# Patient Record
Sex: MALE | Race: WHITE | NOT HISPANIC OR LATINO | Employment: OTHER | URBAN - METROPOLITAN AREA
[De-identification: names, ages, dates, MRNs, and addresses within clinical notes are randomized per-mention and may not be internally consistent; named-entity substitution may affect disease eponyms.]

---

## 2018-08-05 ENCOUNTER — TELEPHONE (OUTPATIENT)
Dept: URGENT CARE | Facility: CLINIC | Age: 83
End: 2018-08-05

## 2019-08-12 ENCOUNTER — APPOINTMENT (OUTPATIENT)
Dept: CT IMAGING | Facility: HOSPITAL | Age: 84
End: 2019-08-12

## 2019-08-12 ENCOUNTER — APPOINTMENT (OUTPATIENT)
Dept: GENERAL RADIOLOGY | Facility: HOSPITAL | Age: 84
End: 2019-08-12

## 2019-08-12 ENCOUNTER — HOSPITAL ENCOUNTER (EMERGENCY)
Facility: HOSPITAL | Age: 84
Discharge: HOME | End: 2019-08-13
Attending: EMERGENCY MEDICINE | Admitting: EMERGENCY MEDICINE

## 2019-08-12 DIAGNOSIS — S01.81XA FACIAL LACERATION, INITIAL ENCOUNTER: ICD-10-CM
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
## 2019-08-12 DIAGNOSIS — S02.2XXA CLOSED FRACTURE OF NASAL BONE, INITIAL ENCOUNTER: ICD-10-CM

## 2019-08-12 DIAGNOSIS — T07.XXXA ABRASIONS OF MULTIPLE SITES: ICD-10-CM

## 2019-08-12 PROCEDURE — 73560 X-RAY EXAM OF KNEE 1 OR 2: CPT

## 2019-08-12 PROCEDURE — 70486 CT MAXILLOFACIAL W/O DYE: CPT

## 2019-08-12 PROCEDURE — 99285 EMERGENCY DEPT VISIT HI MDM: CPT

## 2019-08-12 PROCEDURE — 73130 X-RAY EXAM OF HAND: CPT

## 2019-08-12 PROCEDURE — 70450 CT HEAD/BRAIN W/O DYE: CPT

## 2019-08-12 PROCEDURE — 72125 CT NECK SPINE W/O DYE: CPT

## 2019-08-12 RX ORDER — ONDANSETRON 4 MG/1
4 TABLET, ORALLY DISINTEGRATING ORAL ONCE
Status: COMPLETED | OUTPATIENT
Start: 2019-08-12 | End: 2019-08-12

## 2019-08-12 RX ORDER — OXYCODONE HYDROCHLORIDE AND ACETAMINOPHEN 5; 325 MG/1; MG/1
1 TABLET ORAL ONCE
Status: COMPLETED | OUTPATIENT
Start: 2019-08-12 | End: 2019-08-12

## 2019-08-12 RX ADMIN — OXYCODONE HYDROCHLORIDE AND ACETAMINOPHEN 1 TABLET: 5; 325 TABLET ORAL at 23:37

## 2019-08-12 RX ADMIN — ONDANSETRON 4 MG: 4 TABLET, ORALLY DISINTEGRATING ORAL at 23:38

## 2019-08-13 VITALS
SYSTOLIC BLOOD PRESSURE: 119 MMHG | WEIGHT: 140 LBS | BODY MASS INDEX: 21.97 KG/M2 | TEMPERATURE: 98.4 F | RESPIRATION RATE: 16 BRPM | HEIGHT: 67 IN | OXYGEN SATURATION: 94 % | DIASTOLIC BLOOD PRESSURE: 71 MMHG | HEART RATE: 70 BPM

## 2019-08-13 RX ORDER — LIDOCAINE HYDROCHLORIDE AND EPINEPHRINE 10; 10 MG/ML; UG/ML
10 INJECTION, SOLUTION INFILTRATION; PERINEURAL ONCE
Status: COMPLETED | OUTPATIENT
Start: 2019-08-13 | End: 2019-08-13

## 2019-08-13 RX ADMIN — LIDOCAINE HYDROCHLORIDE,EPINEPHRINE BITARTRATE 10 ML: 10; .01 INJECTION, SOLUTION INFILTRATION; PERINEURAL at 01:57

## 2019-08-13 NOTE — ED PROVIDER NOTES
Subjective   Jesus Montiel Jr. is a 85 y.o.male who presents to the emergency department via EMS s/p fall. The patient states he was walking outside his home at approximately 2100 when he suffered a fall. He tripped over a piece of concrete falling forward. He hit his outstretched hands, face and knees. He now complains of bilateral hand pain, left knee pain, and mild neck pain. He appreciated a laceration to his nose as well as his upper lip and forehead. He denies loss of consciousness. He also denies back pain, chest pain, abdominal pain, nausea, vomiting, abdominal pain or hip pain. He has had no fever or changes in his bowels/urine. He takes Pradaxa for his history of atrial fibrillation. There are no other acute complaints at this time.        History provided by:  Patient  Fall   Mechanism of injury: fall    Injury location:  Face, hand and leg  Facial injury location:  Forehead, nose and upper lip  Hand injury location:  L hand and R hand  Leg injury location:  L knee  Incident location:  Outdoors  Time since incident:  2 hours  Arrived directly from scene: yes    Fall:     Fall occurred:  Walking and tripped    Impact surface:  [place]    Point of impact:  Face, outstretched arms and knees    Entrapped after fall: no    Protective equipment: none    Suspicion of alcohol use: no    Suspicion of drug use: no    Prior to arrival data:     Patient ambulatory at scene: yes      Loss of consciousness: no      Amnesic to event: no    Associated symptoms: neck pain    Associated symptoms: no abdominal pain, no back pain, no chest pain, no difficulty breathing, no loss of consciousness, no nausea and no vomiting        Review of Systems   Constitutional: Negative for chills and fever.   Respiratory: Negative for shortness of breath.    Cardiovascular: Negative for chest pain.   Gastrointestinal: Negative for abdominal pain, nausea and vomiting.   Musculoskeletal: Positive for arthralgias (left knee) and neck pain.  Negative for back pain.        Positive bilateral hand pain   Skin: Positive for wound (laceration).   Neurological: Negative for loss of consciousness.   All other systems reviewed and are negative.      Past Medical History:   Diagnosis Date   • Hyperlipidemia    • Hypertension    • Meniere disease    • Myocardial infarction (CMS/HCC)    • Tremor        No Known Allergies    Past Surgical History:   Procedure Laterality Date   • ABLATION OF DYSRHYTHMIC FOCUS     • CATARACT EXTRACTION, BILATERAL     • CORONARY ANGIOPLASTY WITH STENT PLACEMENT     • CORONARY ARTERY BYPASS BRING BACK FOR EXPLORATION     • HERNIA REPAIR     • INNER EAR SURGERY     • PACEMAKER IMPLANTATION         History reviewed. No pertinent family history.    Social History     Socioeconomic History   • Marital status:      Spouse name: Not on file   • Number of children: Not on file   • Years of education: Not on file   • Highest education level: Not on file   Tobacco Use   • Smoking status: Never Smoker   • Smokeless tobacco: Never Used   Substance and Sexual Activity   • Alcohol use: Yes     Alcohol/week: 2.4 oz     Types: 4 Glasses of wine per week   • Drug use: No   • Sexual activity: Defer         Objective   Physical Exam   Constitutional: He is oriented to person, place, and time. He appears well-developed and well-nourished. No distress.   Patient is alert and oriented. He gives a detailed account of the preceding events.   HENT:   Head: Normocephalic. Head is with abrasion and with laceration.   Nose: Nose lacerations present.   There is an abrasion/laceration to the center of forehead. There is a T-shaped laceration to the bridge of the nose with mild oozing of blood. He has associated tenderness at the nose. He has an abrasion to the center of the upper lip with associated swelling and tenderness. No underlying pain to palpation of the alveolar ridge. No signs of injury to the teeth. He has dried blood in the posterior oropharynx  without active bleeding.    Eyes: Conjunctivae are normal. No scleral icterus.   Neck: Normal range of motion. Neck supple.   He expresses mild tenderness to palpation over the midline neck.    Cardiovascular: Normal rate, regular rhythm and normal heart sounds.   Pulmonary/Chest: Effort normal and breath sounds normal. No respiratory distress. He exhibits no tenderness.   Abdominal: Soft. There is no tenderness.   Musculoskeletal: Normal range of motion.   No T or L-spine tenderness. There is no signs of trauma to the back. He has tenderness to the bilateral hands with associated bruising. No obvious deformity of the hand. Mild tenderness over the anterior left knee with associated bruising.    Neurological: He is alert and oriented to person, place, and time.   Skin: Skin is warm and dry.   Psychiatric: He has a normal mood and affect. His behavior is normal.   Nursing note and vitals reviewed.      Laceration Repair  Date/Time: 8/13/2019 1:53 AM  Performed by: Radni Arana MD  Authorized by: Randi Arana MD     Consent:     Consent obtained:  Verbal    Consent given by:  Patient    Risks discussed:  Infection, pain, poor cosmetic result, retained foreign body, poor wound healing and need for additional repair    Alternatives discussed:  No treatment and observation  Anesthesia (see MAR for exact dosages):     Anesthesia method:  Local infiltration    Local anesthetic:  Lidocaine 1% WITH epi  Laceration details:     Location:  Face    Face location:  Nose    Length (cm):  2    Depth (mm):  5  Repair type:     Repair type:  Simple  Pre-procedure details:     Preparation:  Patient was prepped and draped in usual sterile fashion and imaging obtained to evaluate for foreign bodies  Exploration:     Hemostasis achieved with:  Epinephrine and direct pressure    Wound exploration: wound explored through full range of motion      Wound extent: no fascia violation noted and no foreign bodies/material  noted      Contaminated: no    Treatment:     Area cleansed with:  Betadine    Amount of cleaning:  Standard    Irrigation solution:  Sterile saline    Irrigation volume:  200cc    Irrigation method:  Syringe    Visualized foreign bodies/material removed: no    Skin repair:     Repair method:  Sutures    Suture size:  5-0    Suture material:  Nylon    Suture technique:  Simple interrupted    Number of sutures:  6  Approximation:     Approximation:  Close    Vermilion border: well-aligned    Post-procedure details:     Dressing:  Open (no dressing)    Patient tolerance of procedure:  Tolerated well, no immediate complications                   ED Course     No results found for this or any previous visit (from the past 24 hour(s)).  Note: In addition to lab results from this visit, the labs listed above may include labs taken at another facility or during a different encounter within the last 24 hours. Please correlate lab times with ED admission and discharge times for further clarification of the services performed during this visit.    XR Knee 1 or 2 View Left   Final Result      XR Hand 3+ View Bilateral   Final Result   No acute traumatic findings.      Signer Name: Km Cedeño MD    Signed: 8/12/2019 11:59 PM    Workstation Name: [facility]     [facility]      CT Cervical Spine Without Contrast   Final Result   Degenerative change without acute abnormality      Signer Name: Km Cedeño MD    Signed: 8/12/2019 11:18 PM    [facility]     [facility]      CT Facial Bones Without Contrast   Final Result   Mildly displaced nasal alar fracture, mild frontal scalp soft tissue swelling.      Signer Name: Km Cedeño MD    Signed: 8/12/2019 11:16 PM    [facility]     [facility]      CT Head Without Contrast   Final Result   Senescent changes without acute abnormality.      Signer Name:  Km Cedeño MD    Signed: 8/12/2019 11:12 PM    Workstation Name: [email or web address]     [facility]        Vitals:    08/13/19 0115 08/13/19 0118 08/13/19 0145 08/13/19 0215   BP: 117/63  111/73 119/71   Pulse:  70 70 70   Resp:       Temp:       TempSrc:       SpO2:  92% 95% 99%   Weight:       Height:         Medications   oxyCODONE-acetaminophen (PERCOCET) 5-325 MG per tablet 1 tablet (1 tablet Oral Given 8/12/19 2337)   ondansetron ODT (ZOFRAN-ODT) disintegrating tablet 4 mg (4 mg Oral Given 8/12/19 2338)   lidocaine-EPINEPHrine (XYLOCAINE W/EPI) 1 %-1:676693 injection 10 mL (10 mL Injection Given 8/13/19 0157)     ECG/EMG Results (last 24 hours)     ** No results found for the last 24 hours. **        No orders to display                       MDM  Number of Diagnoses or Management Options  Abrasions of multiple sites: new and requires workup  Closed fracture of nasal bone, initial encounter: new and requires workup  Facial laceration, initial encounter: new and requires workup  Fall, initial encounter: new and requires workup  Diagnosis management comments: No acute injuries identified on CT scan of the head with CT scan of the neck.  No acute fractures identified on x-ray left knee or the bilateral hands.    Nasal bone fracture was identified on CT scan of the face.    Abrasions over the forehead and lips did not require any surgical repair.  Laceration to the nasal bridge required 6 simple interrupted sutures and was approximated well with no complications and no foreign bodies.    Patient will be discharged with the advise to return for suture removal in 5 days.       Amount and/or Complexity of Data Reviewed  Tests in the radiology section of CPT®: ordered and reviewed  Obtain history from someone other than the patient: yes  Review and summarize past medical records: yes  Independent visualization of images, tracings, or specimens: yes        Final diagnoses:   Fall, initial  encounter   Closed fracture of nasal bone, initial encounter   Abrasions of multiple sites   Facial laceration, initial encounter       Documentation assistance provided by chapito Gan.  Information recorded by the scribe was done at my direction and has been verified and validated by me.     Nghia Gan  08/12/19 0231       Nghia Gan  08/13/19 4383       Randi Arana MD  08/13/19 5539

## 2019-08-13 NOTE — DISCHARGE INSTRUCTIONS
Keep wound clean with soap and water.    Follow-up for suture removal in 5 days.    Put ice over areas of pain, swelling, and bruising.

## 2019-08-18 ENCOUNTER — APPOINTMENT (OUTPATIENT)
Dept: GENERAL RADIOLOGY | Facility: HOSPITAL | Age: 84
End: 2019-08-18

## 2019-08-18 ENCOUNTER — HOSPITAL ENCOUNTER (EMERGENCY)
Facility: HOSPITAL | Age: 84
Discharge: HOME | End: 2019-08-18
Attending: EMERGENCY MEDICINE | Admitting: EMERGENCY MEDICINE

## 2019-08-18 VITALS
HEART RATE: 70 BPM | WEIGHT: 147 LBS | SYSTOLIC BLOOD PRESSURE: 105 MMHG | HEIGHT: 67 IN | OXYGEN SATURATION: 95 % | RESPIRATION RATE: 16 BRPM | TEMPERATURE: 98.6 F | DIASTOLIC BLOOD PRESSURE: 52 MMHG | BODY MASS INDEX: 23.07 KG/M2

## 2019-08-18 DIAGNOSIS — Z91.81 HISTORY OF FALL: ICD-10-CM

## 2019-08-18 DIAGNOSIS — M25.562 ACUTE PAIN OF LEFT KNEE: Primary | ICD-10-CM

## 2019-08-18 PROCEDURE — 99283 EMERGENCY DEPT VISIT LOW MDM: CPT

## 2019-08-18 PROCEDURE — 73560 X-RAY EXAM OF KNEE 1 OR 2: CPT

## 2019-08-19 NOTE — ED PROVIDER NOTES
Subjective   Patient presents to the emergency department in follow-up for suture removal.  He was seen in our department on August 12 for a fall having sustained a laceration on his face.  The site is healing well and he is nontender.  His facial ecchymosis is resolving slowly.  Patient goes on to mention that he is experiencing pain in his left knee which was also injured in his fall.  Imaging was performed at that time.  While the patient states he has no pain with weightbearing on flat surfaces, he has made a specific observation that when he walks downstairs, he experiences a shifting within the joint that is very painful.  He continues to have swelling on the knee.  He denies any pain proximally or distally.  He denies any additional injuries.        History provided by:  Patient and relative   used: No    Knee Pain   Lower extremity pain location: Left knee.  Time since incident:  6 days (6)  Injury: yes    Mechanism of injury: fall    Fall:     Point of impact:  Face (And left knee)  Pain details:     Quality:  Aching (When walking downstairs at the left knee)    Radiates to:  Does not radiate    Severity:  Moderate    Onset quality:  Sudden    Duration:  6 days    Progression:  Unchanged  Chronicity:  New  Dislocation: no    Prior injury to area:  Yes (see HPI)  Exacerbated by: Walking down stairways.  Associated symptoms: swelling    Associated symptoms: no back pain, no decreased ROM, no fever, no neck pain, no numbness and no tingling    Risk factors: no concern for non-accidental trauma        Review of Systems   Constitutional: Negative for fever.   Endocrine: Negative.    Musculoskeletal: Positive for joint swelling. Negative for back pain and neck pain.        Left knee pain walking downstairs.     Skin:        Patient has a healing laceration to his face.     Allergic/Immunologic: Negative.    Neurological: Negative.  Negative for dizziness and syncope.   Hematological:  Bruises/bleeds easily (patient has excessive ecchymosis due to use of anticoagulants).   Psychiatric/Behavioral: Negative.    All other systems reviewed and are negative.      Past Medical History:   Diagnosis Date   • Hyperlipidemia    • Hypertension    • Meniere disease    • Myocardial infarction (CMS/HCC)    • Tremor        No Known Allergies    Past Surgical History:   Procedure Laterality Date   • ABLATION OF DYSRHYTHMIC FOCUS     • CATARACT EXTRACTION, BILATERAL     • CORONARY ANGIOPLASTY WITH STENT PLACEMENT     • CORONARY ARTERY BYPASS BRING BACK FOR EXPLORATION     • HERNIA REPAIR     • INNER EAR SURGERY     • PACEMAKER IMPLANTATION         No family history on file.    Social History     Socioeconomic History   • Marital status:      Spouse name: Not on file   • Number of children: Not on file   • Years of education: Not on file   • Highest education level: Not on file   Tobacco Use   • Smoking status: Never Smoker   • Smokeless tobacco: Never Used   Substance and Sexual Activity   • Alcohol use: Yes     Alcohol/week: 2.4 oz     Types: 4 Glasses of wine per week   • Drug use: No   • Sexual activity: Defer           Objective   Physical Exam   Constitutional: He is oriented to person, place, and time. He appears well-developed and well-nourished. No distress.   HENT:   Mouth/Throat: Oropharynx is clear and moist.   She has aged ecchymosis in the periorbital region in the nose.  The aforementioned laceration near the nose is well approximated and does not appear infected.     Eyes: Conjunctivae are normal.   Neck: Normal range of motion. No JVD present.   Cardiovascular: Normal rate.   Pulmonary/Chest: Effort normal and breath sounds normal. He exhibits no tenderness.   Abdominal: Soft.   Musculoskeletal:   Left lower extremity: Patient has mild soft tissue swelling/effusion at the knee.  No laxity x4.  Proximal and distal joints are negative.  Neurovascular exam is negative.  There is no crepitus or  "ballottement   Neurological: He is alert and oriented to person, place, and time. No sensory deficit. Coordination normal.   Skin: Skin is warm and dry. Capillary refill takes less than 2 seconds. He is not diaphoretic.   Psychiatric: He has a normal mood and affect. His behavior is normal. Judgment and thought content normal.       Procedures           ED Course      No results found for this or any previous visit (from the past 24 hour(s)).  Note: In addition to lab results from this visit, the labs listed above may include labs taken at another facility or during a different encounter within the last 24 hours. Please correlate lab times with ED admission and discharge times for further clarification of the services performed during this visit.    XR Knee 1 or 2 View Left   Final Result   Mild tricompartmental osteoarthritis and a small suprapatellar joint effusion. Effusion smaller than on prior.      Signer Name: Pam Mondragon MD    Signed: 8/18/2019 9:05 PM    [email or web address]     [facility]        Vitals:    08/18/19 1920   BP: 105/52   BP Location: Left arm   Patient Position: Sitting   Pulse: 70   Resp: 16   Temp: 98.6 °F (37 °C)   TempSrc: Oral   SpO2: 95%   Weight: 66.7 kg (147 lb)   Height: 170.2 cm (67\")     Medications - No data to display  ECG/EMG Results (last 24 hours)     ** No results found for the last 24 hours. **        No orders to display                   MDM      Final diagnoses:   Acute pain of left knee   History of fall            Evita Gonzalez, APRN  08/23/19 8175    "

## 2019-08-19 NOTE — DISCHARGE INSTRUCTIONS
Use the Ace wrap on the left knee until follow-up with orthopedic surgeon, Dr. reinoso.  Call his office tomorrow for a follow-up appointment.  In the meantime, discontinue use of stairs and consider escalators when in public.  Tylenol as needed for discomfort.  Return to the emergency department as needed for worsening symptoms or concerns.  Thank you

## 2020-08-03 ENCOUNTER — APPOINTMENT (OUTPATIENT)
Dept: GENERAL RADIOLOGY | Facility: HOSPITAL | Age: 85
End: 2020-08-03

## 2020-08-03 ENCOUNTER — HOSPITAL ENCOUNTER (EMERGENCY)
Facility: HOSPITAL | Age: 85
Discharge: HOME | End: 2020-08-03
Attending: EMERGENCY MEDICINE | Admitting: EMERGENCY MEDICINE

## 2020-08-03 VITALS
OXYGEN SATURATION: 96 % | SYSTOLIC BLOOD PRESSURE: 115 MMHG | BODY MASS INDEX: 22.73 KG/M2 | HEIGHT: 68 IN | HEART RATE: 70 BPM | TEMPERATURE: 97.7 F | RESPIRATION RATE: 12 BRPM | DIASTOLIC BLOOD PRESSURE: 69 MMHG | WEIGHT: 150 LBS
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
## 2020-08-03 DIAGNOSIS — S40.012A CONTUSION OF LEFT SHOULDER, INITIAL ENCOUNTER: ICD-10-CM

## 2020-08-03 PROCEDURE — 72040 X-RAY EXAM NECK SPINE 2-3 VW: CPT

## 2020-08-03 PROCEDURE — 73000 X-RAY EXAM OF COLLAR BONE: CPT

## 2020-08-03 PROCEDURE — 73030 X-RAY EXAM OF SHOULDER: CPT

## 2020-08-03 PROCEDURE — 99284 EMERGENCY DEPT VISIT MOD MDM: CPT

## 2020-08-03 PROCEDURE — P9612 CATHETERIZE FOR URINE SPEC: HCPCS

## 2020-08-03 NOTE — ED PROVIDER NOTES
Subjective   Jesus Montiel Jr. is an 86 y.o. male who presents to the ED with c/o fall. The patient reports he was ambulating around the parking lot of his long term care facility when he fell. During the fall he made impact on the front fender of a pickup truck with his left shoulder, slowly sliding down the fender onto the concrete pavement. The patient did not his his head or lose consciousness during the fall but since then he has been experiencing left shoulder pain, left sided neck pain, and he suffered some abrasions to his left elbow. He denies any rib pain. The patient is currently anticoagulated with 2.5 mg of Eliquis twice daily. He has a past medical history of hyperlipidemia, hypertension, myocardial infarction, and Meniere disease. His surgical history includes CABG, ablation of dysrhythmic focus, coronary angioplasty with stent, and pacemaker implantation. There are no other acute complaints at this time.       History provided by:  Patient and EMS personnel  Fall   Mechanism of injury: fall    Injury location:  Head/neck and shoulder/arm  Head/neck injury location:  L neck  Shoulder/arm injury location:  L shoulder  Incident location:  Outdoors  Time since incident:  3 hours  Arrived directly from scene: yes    Fall:     Fall occurred:  Walking    Impact surface:  [place] (pickup truck)    Point of impact: left shoulder.  Suspicion of alcohol use: no    Suspicion of drug use: no    Tetanus status:  Unknown  Prior to arrival data:     Patient ambulatory at scene: yes      Blood loss:  Minimal    Responsiveness at scene:  Alert    Orientation at scene:  Place, situation, time and person    Loss of consciousness: no      Amnesic to event: no    Associated symptoms: neck pain    Associated symptoms: no abdominal pain, no back pain, no chest pain, no loss of consciousness, no nausea, no seizures and no vomiting    Risk factors: anticoagulation therapy, CABG, pacemaker and past MI    Risk factors: no CHF,  no COPD and no diabetes        Review of Systems   HENT:        The patient denies hitting his head.   Cardiovascular: Negative for chest pain.   Gastrointestinal: Negative for abdominal pain, nausea and vomiting.   Musculoskeletal: Positive for neck pain. Negative for back pain.        The patient complains of left shoulder pain and left sided neck pain.  He denies rib pain.   Skin: Positive for wound.        The patient complains of abrasions to his left elbow.   Neurological: Negative for seizures, loss of consciousness and syncope.        He denies loss of consciousness.   All other systems reviewed and are negative.      Past Medical History:   Diagnosis Date   • Hyperlipidemia    • Hypertension    • Meniere disease    • Myocardial infarction (CMS/HCC)    • Tremor        No Known Allergies    Past Surgical History:   Procedure Laterality Date   • ABLATION OF DYSRHYTHMIC FOCUS     • CATARACT EXTRACTION, BILATERAL     • CORONARY ANGIOPLASTY WITH STENT PLACEMENT     • CORONARY ARTERY BYPASS BRING BACK FOR EXPLORATION     • HERNIA REPAIR     • INNER EAR SURGERY     • PACEMAKER IMPLANTATION         History reviewed. No pertinent family history.    Social History     Socioeconomic History   • Marital status:      Spouse name: Not on file   • Number of children: Not on file   • Years of education: Not on file   • Highest education level: Not on file   Tobacco Use   • Smoking status: Never Smoker   • Smokeless tobacco: Never Used   Substance and Sexual Activity   • Alcohol use: Yes     Alcohol/week: 4.0 standard drinks     Types: 4 Glasses of wine per week   • Drug use: No   • Sexual activity: Defer         Objective   Physical Exam   Constitutional: He is oriented to person, place, and time. He appears well-developed and well-nourished. No distress.   HENT:   Head: Normocephalic and atraumatic.   Eyes: Conjunctivae are normal. No scleral icterus.   Neck: Normal range of motion. Neck supple.   Cardiovascular:  Normal rate, regular rhythm and normal heart sounds.   No murmur heard.  Pulmonary/Chest: Effort normal and breath sounds normal. No respiratory distress. He exhibits tenderness.   Tenderness to palpation to the left lateral ribs.   Abdominal: Soft. There is no tenderness.   Musculoskeletal: Normal range of motion. He exhibits tenderness. He exhibits no edema.        Left shoulder: He exhibits tenderness.        Right hip: He exhibits no tenderness and no bony tenderness.        Left hip: He exhibits no tenderness and no bony tenderness.        Right knee: No tenderness found.        Left knee: No tenderness found.        Cervical back: He exhibits tenderness.   Tenderness to palpation to the left anterior shoulder, left clavicle, and left lateral ribs. Normal range of motion.  No tenderness to palpation to the bilateral knees and hips.  Mild left cervical paravertebral tenderness to palpation.  He is currently in a hard collar.  No tenderness to palpation to the bilateral elbows and wrists. Normal range of motion.   Neurological: He is alert and oriented to person, place, and time.   Normal mentation.   Skin: Skin is warm and dry. Laceration noted.   Small skin tear to the left elbow. No active bleeding.   Psychiatric: He has a normal mood and affect. His behavior is normal.   Nursing note and vitals reviewed.      Procedures         ED Course     No results found for this or any previous visit (from the past 24 hour(s)).  Note: In addition to lab results from this visit, the labs listed above may include labs taken at another facility or during a different encounter within the last 24 hours. Please correlate lab times with ED admission and discharge times for further clarification of the services performed during this visit.    XR Clavicle Left   Final Result   No acute fracture or dislocation of the left clavicle.   Moderate AC joint degenerative change.       This report was finalized on 8/3/2020 5:07 PM by  Endy Chacko.          XR Shoulder 2+ View Left   Final Result   No acute fracture or dislocation. Moderate AC joint   degenerative changes.       This report was finalized on 8/3/2020 5:06 PM by Endy Chacko.          XR Spine Cervical 2 or 3 View   Final Result   No acute fracture or subluxation appreciated.       This report was finalized on 8/3/2020 5:10 PM by Endy Chacko.            Vitals:    08/03/20 1545 08/03/20 1600 08/03/20 1700 08/03/20 1815   BP: 133/75 112/66 115/69    Patient Position: Sitting      Pulse: 70   70   Resp: 12      Temp: 97.7 °F (36.5 °C)      TempSrc: Oral      SpO2: 95% 93% 93% 96%   Weight:       Height:         Medications - No data to display  ECG/EMG Results (last 24 hours)     ** No results found for the last 24 hours. **        No orders to display       Patient ambulatory prior to discharge.  Discussed x-ray findings with patient.  Advised to do ice, rest, and follow-up with primary care provider in the next 2 to 3 days or return to the ER with worsening of symptoms.  Patient verbalized understanding                                       MDM    Final diagnoses:   Fall from standing, initial encounter   Contusion of left shoulder, initial encounter       Documentation assistance provided by chapito Richmond.  Information recorded by the chapito was done at my direction and has been verified and validated by me.     Damian Richmond  08/03/20 7486       Becca Saini APRN  08/04/20 5322

## 2020-09-27 ENCOUNTER — HOSPITAL ENCOUNTER (EMERGENCY)
Facility: HOSPITAL | Age: 85
Discharge: HOME | End: 2020-09-27
Attending: EMERGENCY MEDICINE | Admitting: EMERGENCY MEDICINE

## 2020-09-27 ENCOUNTER — APPOINTMENT (OUTPATIENT)
Dept: GENERAL RADIOLOGY | Facility: HOSPITAL | Age: 85
End: 2020-09-27

## 2020-09-27 ENCOUNTER — APPOINTMENT (OUTPATIENT)
Dept: CT IMAGING | Facility: HOSPITAL | Age: 85
End: 2020-09-27

## 2020-09-27 VITALS
HEIGHT: 69 IN | OXYGEN SATURATION: 97 % | BODY MASS INDEX: 20.73 KG/M2 | HEART RATE: 70 BPM | RESPIRATION RATE: 16 BRPM | DIASTOLIC BLOOD PRESSURE: 64 MMHG | SYSTOLIC BLOOD PRESSURE: 114 MMHG | TEMPERATURE: 97.5 F | WEIGHT: 140 LBS

## 2020-09-27 DIAGNOSIS — M25.562 ACUTE PAIN OF LEFT KNEE: ICD-10-CM

## 2020-09-27 DIAGNOSIS — S00.83XA TRAUMATIC HEMATOMA OF FOREHEAD, INITIAL ENCOUNTER: ICD-10-CM

## 2020-09-27 DIAGNOSIS — S02.2XXA CLOSED FRACTURE OF NASAL BONE, INITIAL ENCOUNTER: Primary | ICD-10-CM

## 2020-09-27 DIAGNOSIS — T07.XXXA ABRASIONS OF MULTIPLE SITES: ICD-10-CM

## 2020-09-27 PROCEDURE — 70486 CT MAXILLOFACIAL W/O DYE: CPT

## 2020-09-27 PROCEDURE — 90715 TDAP VACCINE 7 YRS/> IM: CPT | Performed by: EMERGENCY MEDICINE

## 2020-09-27 PROCEDURE — 25010000002 TDAP 5-2.5-18.5 LF-MCG/0.5 SUSPENSION: Performed by: EMERGENCY MEDICINE

## 2020-09-27 PROCEDURE — 73552 X-RAY EXAM OF FEMUR 2/>: CPT

## 2020-09-27 PROCEDURE — 73560 X-RAY EXAM OF KNEE 1 OR 2: CPT

## 2020-09-27 PROCEDURE — 70450 CT HEAD/BRAIN W/O DYE: CPT

## 2020-09-27 PROCEDURE — 90471 IMMUNIZATION ADMIN: CPT | Performed by: EMERGENCY MEDICINE

## 2020-09-27 PROCEDURE — 72125 CT NECK SPINE W/O DYE: CPT

## 2020-09-27 PROCEDURE — 99284 EMERGENCY DEPT VISIT MOD MDM: CPT

## 2020-09-27 PROCEDURE — 72170 X-RAY EXAM OF PELVIS: CPT

## 2020-09-27 RX ORDER — HYDROCODONE BITARTRATE AND ACETAMINOPHEN 5; 325 MG/1; MG/1
1 TABLET ORAL ONCE
Status: COMPLETED | OUTPATIENT
Start: 2020-09-27 | End: 2020-09-27

## 2020-09-27 RX ORDER — CHOLECALCIFEROL (VITAMIN D3) 125 MCG
CAPSULE ORAL DAILY
COMMUNITY

## 2020-09-27 RX ADMIN — HYDROCODONE BITARTATE AND ACETAMINOPHEN 1 TABLET: 5; 325 TABLET ORAL at 18:39

## 2020-09-27 RX ADMIN — TETANUS TOXOID, REDUCED DIPHTHERIA TOXOID AND ACELLULAR PERTUSSIS VACCINE, ADSORBED 0.5 ML: 5; 2.5; 8; 8; 2.5 SUSPENSION INTRAMUSCULAR at 18:33

## 2020-11-16 ENCOUNTER — HOSPITAL ENCOUNTER (INPATIENT)
Facility: HOSPITAL | Age: 85
LOS: 3 days | Discharge: HOME HEALTH | End: 2020-11-19
Attending: EMERGENCY MEDICINE | Admitting: INTERNAL MEDICINE

## 2020-11-16 DIAGNOSIS — L03.115 CELLULITIS OF RIGHT LEG: Primary | ICD-10-CM

## 2020-11-16 DIAGNOSIS — J40 BRONCHITIS: ICD-10-CM

## 2020-11-16 PROBLEM — Z78.9 FAILURE OF OUTPATIENT TREATMENT: Status: ACTIVE | Noted: 2020-11-16

## 2020-11-16 PROBLEM — I48.91 A-FIB: Status: ACTIVE | Noted: 2020-11-16

## 2020-11-16 LAB
ALBUMIN SERPL-MCNC: 3.9 G/DL (ref 3.5–5.2)
ALBUMIN/GLOB SERPL: 1.4 G/DL
ALP SERPL-CCNC: 89 U/L (ref 39–117)
ALT SERPL W P-5'-P-CCNC: 14 U/L (ref 1–41)
ANION GAP SERPL CALCULATED.3IONS-SCNC: 6 MMOL/L (ref 5–15)
AST SERPL-CCNC: 20 U/L (ref 1–40)
BASOPHILS # BLD AUTO: 0.02 10*3/MM3 (ref 0–0.2)
BASOPHILS NFR BLD AUTO: 0.3 % (ref 0–1.5)
BILIRUB SERPL-MCNC: 0.6 MG/DL (ref 0–1.2)
BUN SERPL-MCNC: 30 MG/DL (ref 8–23)
BUN/CREAT SERPL: 30.9 (ref 7–25)
CALCIUM SPEC-SCNC: 9.5 MG/DL (ref 8.6–10.5)
CHLORIDE SERPL-SCNC: 106 MMOL/L (ref 98–107)
CO2 SERPL-SCNC: 29 MMOL/L (ref 22–29)
CREAT SERPL-MCNC: 0.97 MG/DL (ref 0.76–1.27)
D-LACTATE SERPL-SCNC: 1 MMOL/L (ref 0.5–2)
DEPRECATED RDW RBC AUTO: 48.2 FL (ref 37–54)
EOSINOPHIL # BLD AUTO: 0.05 10*3/MM3 (ref 0–0.4)
EOSINOPHIL NFR BLD AUTO: 0.7 % (ref 0.3–6.2)
ERYTHROCYTE [DISTWIDTH] IN BLOOD BY AUTOMATED COUNT: 13.8 % (ref 12.3–15.4)
GFR SERPL CREATININE-BSD FRML MDRD: 73 ML/MIN/1.73
GLOBULIN UR ELPH-MCNC: 2.7 GM/DL
GLUCOSE SERPL-MCNC: 103 MG/DL (ref 65–99)
HCT VFR BLD AUTO: 41.1 % (ref 37.5–51)
HGB BLD-MCNC: 13.1 G/DL (ref 13–17.7)
HOLD SPECIMEN: NORMAL
HOLD SPECIMEN: NORMAL
IMM GRANULOCYTES # BLD AUTO: 0.01 10*3/MM3 (ref 0–0.05)
IMM GRANULOCYTES NFR BLD AUTO: 0.1 % (ref 0–0.5)
LYMPHOCYTES # BLD AUTO: 0.8 10*3/MM3 (ref 0.7–3.1)
LYMPHOCYTES NFR BLD AUTO: 11.6 % (ref 19.6–45.3)
MCH RBC QN AUTO: 30.3 PG (ref 26.6–33)
MCHC RBC AUTO-ENTMCNC: 31.9 G/DL (ref 31.5–35.7)
MCV RBC AUTO: 94.9 FL (ref 79–97)
MONOCYTES # BLD AUTO: 0.71 10*3/MM3 (ref 0.1–0.9)
MONOCYTES NFR BLD AUTO: 10.3 % (ref 5–12)
NEUTROPHILS NFR BLD AUTO: 5.31 10*3/MM3 (ref 1.7–7)
NEUTROPHILS NFR BLD AUTO: 77 % (ref 42.7–76)
NRBC BLD AUTO-RTO: 0 /100 WBC (ref 0–0.2)
PLATELET # BLD AUTO: 114 10*3/MM3 (ref 140–450)
PMV BLD AUTO: 11.8 FL (ref 6–12)
POTASSIUM SERPL-SCNC: 4.7 MMOL/L (ref 3.5–5.2)
PROT SERPL-MCNC: 6.6 G/DL (ref 6–8.5)
RBC # BLD AUTO: 4.33 10*6/MM3 (ref 4.14–5.8)
SODIUM SERPL-SCNC: 141 MMOL/L (ref 136–145)
WBC # BLD AUTO: 6.9 10*3/MM3 (ref 3.4–10.8)
WHOLE BLOOD HOLD SPECIMEN: NORMAL
WHOLE BLOOD HOLD SPECIMEN: NORMAL

## 2020-11-16 PROCEDURE — 93010 ELECTROCARDIOGRAM REPORT: CPT | Performed by: INTERNAL MEDICINE

## 2020-11-16 PROCEDURE — 25010000002 VANCOMYCIN 10 G RECONSTITUTED SOLUTION: Performed by: EMERGENCY MEDICINE

## 2020-11-16 PROCEDURE — 83605 ASSAY OF LACTIC ACID: CPT | Performed by: EMERGENCY MEDICINE

## 2020-11-16 PROCEDURE — 87040 BLOOD CULTURE FOR BACTERIA: CPT | Performed by: EMERGENCY MEDICINE

## 2020-11-16 PROCEDURE — U0004 COV-19 TEST NON-CDC HGH THRU: HCPCS | Performed by: FAMILY MEDICINE

## 2020-11-16 PROCEDURE — 93005 ELECTROCARDIOGRAM TRACING: CPT | Performed by: FAMILY MEDICINE

## 2020-11-16 PROCEDURE — 99285 EMERGENCY DEPT VISIT HI MDM: CPT

## 2020-11-16 PROCEDURE — 99222 1ST HOSP IP/OBS MODERATE 55: CPT | Performed by: FAMILY MEDICINE

## 2020-11-16 PROCEDURE — 80053 COMPREHEN METABOLIC PANEL: CPT | Performed by: EMERGENCY MEDICINE

## 2020-11-16 PROCEDURE — 85025 COMPLETE CBC W/AUTO DIFF WBC: CPT | Performed by: EMERGENCY MEDICINE

## 2020-11-16 RX ORDER — SODIUM CHLORIDE 0.9 % (FLUSH) 0.9 %
10 SYRINGE (ML) INJECTION EVERY 12 HOURS SCHEDULED
Status: DISCONTINUED | OUTPATIENT
Start: 2020-11-16 | End: 2020-11-19 | Stop reason: HOSPADM

## 2020-11-16 RX ORDER — CHOLECALCIFEROL (VITAMIN D3) 125 MCG
5 CAPSULE ORAL NIGHTLY PRN
Status: DISCONTINUED | OUTPATIENT
Start: 2020-11-16 | End: 2020-11-19 | Stop reason: HOSPADM

## 2020-11-16 RX ORDER — SODIUM CHLORIDE 9 MG/ML
100 INJECTION, SOLUTION INTRAVENOUS CONTINUOUS
Status: ACTIVE | OUTPATIENT
Start: 2020-11-16 | End: 2020-11-17

## 2020-11-16 RX ORDER — ALBUTEROL SULFATE 90 UG/1
2 AEROSOL, METERED RESPIRATORY (INHALATION) EVERY 6 HOURS PRN
Status: DISCONTINUED | OUTPATIENT
Start: 2020-11-16 | End: 2020-11-19 | Stop reason: HOSPADM

## 2020-11-16 RX ORDER — DABIGATRAN ETEXILATE 150 MG/1
150 CAPSULE ORAL EVERY 12 HOURS SCHEDULED
Status: DISCONTINUED | OUTPATIENT
Start: 2020-11-16 | End: 2020-11-19 | Stop reason: HOSPADM

## 2020-11-16 RX ORDER — SODIUM CHLORIDE 0.9 % (FLUSH) 0.9 %
10 SYRINGE (ML) INJECTION AS NEEDED
Status: DISCONTINUED | OUTPATIENT
Start: 2020-11-16 | End: 2020-11-19 | Stop reason: HOSPADM

## 2020-11-16 RX ORDER — ACETAMINOPHEN 650 MG/1
650 SUPPOSITORY RECTAL EVERY 4 HOURS PRN
Status: DISCONTINUED | OUTPATIENT
Start: 2020-11-16 | End: 2020-11-19 | Stop reason: HOSPADM

## 2020-11-16 RX ORDER — ACETAMINOPHEN 160 MG/5ML
650 SOLUTION ORAL EVERY 4 HOURS PRN
Status: DISCONTINUED | OUTPATIENT
Start: 2020-11-16 | End: 2020-11-19 | Stop reason: HOSPADM

## 2020-11-16 RX ORDER — ACETAMINOPHEN 325 MG/1
650 TABLET ORAL EVERY 4 HOURS PRN
Status: DISCONTINUED | OUTPATIENT
Start: 2020-11-16 | End: 2020-11-19 | Stop reason: HOSPADM

## 2020-11-16 RX ORDER — ATORVASTATIN CALCIUM 10 MG/1
10 TABLET, FILM COATED ORAL DAILY
Status: DISCONTINUED | OUTPATIENT
Start: 2020-11-17 | End: 2020-11-19 | Stop reason: HOSPADM

## 2020-11-16 RX ADMIN — VANCOMYCIN HYDROCHLORIDE 1250 MG: 100 INJECTION, POWDER, LYOPHILIZED, FOR SOLUTION INTRAVENOUS at 20:38

## 2020-11-16 RX ADMIN — SODIUM CHLORIDE 100 ML/HR: 9 INJECTION, SOLUTION INTRAVENOUS at 22:07

## 2020-11-16 RX ADMIN — SODIUM CHLORIDE, PRESERVATIVE FREE 10 ML: 5 INJECTION INTRAVENOUS at 22:07

## 2020-11-16 RX ADMIN — DABIGATRAN ETEXILATE MESYLATE 150 MG: 150 CAPSULE ORAL at 22:40

## 2020-11-16 NOTE — ED PROVIDER NOTES
Subjective   86-year-old male presents with complaint of right lower extremity swelling and drainage.  The patient has had some degree of redness to the right lower extremity for approximate the last week.  He was initiated on Keflex approximately 6 days ago and has completed that course.  He reports over the last 3 days a focal area of swelling has opened up and began to drain significant clear fluid from right lower anterior tibial region. Feels that the fluid has began to drain more since initiating the antibiotics.  He reports some subjective fever, and mild chills.  He denies any respiratory symptoms including no upper respiratory congestion, sore throat, rhinorrhea, cough, chest pain, shortness of breath, no reported change in sense of taste or smell.  No report abdominal pain, no change in bowel or urinary function.  The patient is from [facility].          Review of Systems   Constitutional: Negative for chills, fatigue and fever.   HENT: Negative for congestion, ear pain, postnasal drip, sinus pressure and sore throat.    Eyes: Negative for pain, redness and visual disturbance.   Respiratory: Negative for cough, chest tightness and shortness of breath.    Cardiovascular: Negative for chest pain, palpitations and leg swelling.   Gastrointestinal: Negative for abdominal pain, anal bleeding, blood in stool, diarrhea, nausea and vomiting.   Endocrine: Negative for polydipsia and polyuria.   Genitourinary: Negative for difficulty urinating, dysuria, frequency and urgency.   Musculoskeletal: Negative for arthralgias, back pain and neck pain.   Skin: Positive for color change, rash and wound. Negative for pallor.   Allergic/Immunologic: Negative for environmental allergies and immunocompromised state.   Neurological: Negative for dizziness, weakness and headaches.   Hematological: Negative for adenopathy.   Psychiatric/Behavioral: Negative for confusion, self-injury and  suicidal ideas. The patient is not nervous/anxious.    All other systems reviewed and are negative.      Past Medical History:   Diagnosis Date   • Hyperlipidemia    • Hypertension    • Meniere disease    • Myocardial infarction (CMS/HCC)    • Tremor        No Known Allergies    Past Surgical History:   Procedure Laterality Date   • ABLATION OF DYSRHYTHMIC FOCUS     • CATARACT EXTRACTION, BILATERAL     • CORONARY ANGIOPLASTY WITH STENT PLACEMENT     • CORONARY ARTERY BYPASS BRING BACK FOR EXPLORATION     • HERNIA REPAIR     • INNER EAR SURGERY     • PACEMAKER IMPLANTATION         History reviewed. No pertinent family history.    Social History     Socioeconomic History   • Marital status:      Spouse name: Not on file   • Number of children: Not on file   • Years of education: Not on file   • Highest education level: Not on file   Tobacco Use   • Smoking status: Never Smoker   • Smokeless tobacco: Never Used   Substance and Sexual Activity   • Alcohol use: Yes     Alcohol/week: 4.0 standard drinks     Types: 4 Glasses of wine per week   • Drug use: No   • Sexual activity: Defer           Objective   Physical Exam  Vitals signs and nursing note reviewed.   Constitutional:       General: He is not in acute distress.     Appearance: Normal appearance. He is well-developed. He is not toxic-appearing or diaphoretic.   HENT:      Head: Normocephalic and atraumatic.      Right Ear: External ear normal.      Left Ear: External ear normal.      Nose: Nose normal.   Eyes:      General: Lids are normal.      Pupils: Pupils are equal, round, and reactive to light.   Neck:      Musculoskeletal: Normal range of motion and neck supple.      Trachea: No tracheal deviation.   Cardiovascular:      Rate and Rhythm: Normal rate and regular rhythm.      Pulses: No decreased pulses.      Heart sounds: Normal heart sounds. No murmur. No friction rub. No gallop.    Pulmonary:      Effort: Pulmonary effort is normal. No respiratory  distress.      Breath sounds: Normal breath sounds. No decreased breath sounds, wheezing, rhonchi or rales.   Abdominal:      General: Bowel sounds are normal.      Palpations: Abdomen is soft.      Tenderness: There is no abdominal tenderness. There is no guarding or rebound.   Musculoskeletal: Normal range of motion.         General: No deformity.   Lymphadenopathy:      Cervical: No cervical adenopathy.   Skin:     General: Skin is warm and dry.      Findings: Erythema and rash present.          Neurological:      Mental Status: He is alert and oriented to person, place, and time.      Cranial Nerves: No cranial nerve deficit.      Sensory: No sensory deficit.   Psychiatric:         Speech: Speech normal.         Behavior: Behavior normal.         Thought Content: Thought content normal.         Judgment: Judgment normal.         Procedures           ED Course                                           MDM  Number of Diagnoses or Management Options  Cellulitis of right leg: new and requires workup  Diagnosis management comments: Patient presents with complaint of right lower extremity redness with associated drainage.  It is tender to palpation and slightly warm to touch.    The patient does not report systemic symptoms of illness.  No reported respiratory symptoms.  Patient lives at [facility].    No significant lab abnormalities.  Vital signs have remained stable.    The patient has taken a course of Keflex without improvement, in fact reports worsening of the right lower extremity cellulitis.    IV vancomycin ordered here in the ER.    Discussed the patient with the hospitalist, Dr. Lemus, who will admit.       Amount and/or Complexity of Data Reviewed  Clinical lab tests: ordered and reviewed  Tests in the radiology section of CPT®: ordered and reviewed  Review and summarize past medical records: yes  Discuss the patient with other providers: yes  Independent visualization  of images, tracings, or specimens: yes        Final diagnoses:   Cellulitis of right leg            Randi Arana MD  11/16/20 7217

## 2020-11-17 LAB
ANION GAP SERPL CALCULATED.3IONS-SCNC: 13 MMOL/L (ref 5–15)
BUN SERPL-MCNC: 20 MG/DL (ref 8–23)
BUN/CREAT SERPL: 31.3 (ref 7–25)
CALCIUM SPEC-SCNC: 8.3 MG/DL (ref 8.6–10.5)
CHLORIDE SERPL-SCNC: 107 MMOL/L (ref 98–107)
CO2 SERPL-SCNC: 20 MMOL/L (ref 22–29)
CREAT SERPL-MCNC: 0.64 MG/DL (ref 0.76–1.27)
DEPRECATED RDW RBC AUTO: 46.5 FL (ref 37–54)
ERYTHROCYTE [DISTWIDTH] IN BLOOD BY AUTOMATED COUNT: 13.7 % (ref 12.3–15.4)
GFR SERPL CREATININE-BSD FRML MDRD: 119 ML/MIN/1.73
GLUCOSE SERPL-MCNC: 82 MG/DL (ref 65–99)
HCT VFR BLD AUTO: 39.3 % (ref 37.5–51)
HGB BLD-MCNC: 12.9 G/DL (ref 13–17.7)
MCH RBC QN AUTO: 30.6 PG (ref 26.6–33)
MCHC RBC AUTO-ENTMCNC: 32.8 G/DL (ref 31.5–35.7)
MCV RBC AUTO: 93.3 FL (ref 79–97)
PLATELET # BLD AUTO: 94 10*3/MM3 (ref 140–450)
PMV BLD AUTO: 12.1 FL (ref 6–12)
POTASSIUM SERPL-SCNC: 4.3 MMOL/L (ref 3.5–5.2)
QT INTERVAL: 452 MS
QTC INTERVAL: 488 MS
RBC # BLD AUTO: 4.21 10*6/MM3 (ref 4.14–5.8)
SARS-COV-2 RNA RESP QL NAA+PROBE: NOT DETECTED
SODIUM SERPL-SCNC: 140 MMOL/L (ref 136–145)
WBC # BLD AUTO: 6.24 10*3/MM3 (ref 3.4–10.8)

## 2020-11-17 PROCEDURE — 25010000002 VANCOMYCIN PER 500 MG

## 2020-11-17 PROCEDURE — 25010000002 CEFTRIAXONE PER 250 MG: Performed by: FAMILY MEDICINE

## 2020-11-17 PROCEDURE — 97165 OT EVAL LOW COMPLEX 30 MIN: CPT

## 2020-11-17 PROCEDURE — 97161 PT EVAL LOW COMPLEX 20 MIN: CPT

## 2020-11-17 PROCEDURE — 80048 BASIC METABOLIC PNL TOTAL CA: CPT | Performed by: PHYSICIAN ASSISTANT

## 2020-11-17 PROCEDURE — 99232 SBSQ HOSP IP/OBS MODERATE 35: CPT | Performed by: INTERNAL MEDICINE

## 2020-11-17 PROCEDURE — 85027 COMPLETE CBC AUTOMATED: CPT | Performed by: PHYSICIAN ASSISTANT

## 2020-11-17 RX ORDER — VANCOMYCIN HYDROCHLORIDE 1 G/200ML
1000 INJECTION, SOLUTION INTRAVENOUS EVERY 24 HOURS
Status: DISCONTINUED | OUTPATIENT
Start: 2020-11-17 | End: 2020-11-18 | Stop reason: DRUGHIGH

## 2020-11-17 RX ADMIN — VANCOMYCIN HYDROCHLORIDE 1000 MG: 1 INJECTION, SOLUTION INTRAVENOUS at 21:25

## 2020-11-17 RX ADMIN — CEFTRIAXONE SODIUM 1 G: 1 INJECTION, POWDER, FOR SOLUTION INTRAMUSCULAR; INTRAVENOUS at 00:27

## 2020-11-17 RX ADMIN — SODIUM CHLORIDE 500 ML: 9 INJECTION, SOLUTION INTRAVENOUS at 08:25

## 2020-11-17 RX ADMIN — DABIGATRAN ETEXILATE MESYLATE 150 MG: 150 CAPSULE ORAL at 20:48

## 2020-11-17 RX ADMIN — ATORVASTATIN CALCIUM 10 MG: 10 TABLET, FILM COATED ORAL at 08:24

## 2020-11-17 RX ADMIN — SODIUM CHLORIDE, PRESERVATIVE FREE 10 ML: 5 INJECTION INTRAVENOUS at 20:48

## 2020-11-17 RX ADMIN — DABIGATRAN ETEXILATE MESYLATE 150 MG: 150 CAPSULE ORAL at 08:24

## 2020-11-17 RX ADMIN — CEFTRIAXONE SODIUM 1 G: 1 INJECTION, POWDER, FOR SOLUTION INTRAMUSCULAR; INTRAVENOUS at 20:49

## 2020-11-17 RX ADMIN — SODIUM CHLORIDE, PRESERVATIVE FREE 10 ML: 5 INJECTION INTRAVENOUS at 08:25

## 2020-11-17 NOTE — NURSING NOTE
WOC nurse consult for wound to RLE    Patient has very large blister to RLE - this is deroofed, very painful    Carefully removed adhered ABD with foam  and blue wipes, still has small amount of serious drainage left in the blister.  Unable to debride due to pain    Covered with xeroform, foam and kerlix    Change everyday    WOC nurse to follow, may need outpatient  Wound care for debridement and wound management    Patient stated he has a zipper type compression sleeve at home that he uses.    Patient SHWETA.

## 2020-11-17 NOTE — PLAN OF CARE
Goal Outcome Evaluation:  Plan of Care Reviewed With: patient  Progress: improving  Outcome Summary: VSS, slight hypotension. RA, A/Ox4. Denies pain. Daughter at bedside for a visit. Up to chair majority of the day. No other complaints at this time.

## 2020-11-17 NOTE — PLAN OF CARE
Outcome Summary: pt remains a/o x 4, room air, V paced, vss. pt c/o bilateral lower extremity pain early in shift then pain sudbsided, medication offered but refused. bottom red but blanchable, turned q 2 hr. RLE red and edematous, blister draining serous fluid, cleansed w/normal saline, abd pad in place and wrapped in curlex, per MD order. dressing remains C/D/I. Bilateral pedal pulses +1. IV fluids intiated per order. using urinal. will continue to monitor.

## 2020-11-17 NOTE — PROGRESS NOTES
Discharge Planning Assessment  [facility]     Patient Name: Jesus Montiel Jr.  MRN: [id number]  Today's Date: 11/17/2020    Admit Date: 11/16/2020    Discharge Needs Assessment     Row Name 11/17/20 0940       Living Environment    Lives With  alone    Current Living Arrangements  independent/assisted living facility Lives at [facility]    Primary Care Provided by  self    Provides Primary Care For  no one    Family Caregiver if Needed  child(gilmar), adult    Family Caregiver Names  Mary Jo Montiel-dtr    Quality of Family Relationships  involved    Able to Return to Prior Arrangements  yes If back to baseline.       Resource/Environmental Concerns    Resource/Environmental Concerns  none    Transportation Concerns  car, none       Transition Planning    Patient/Family Anticipates Transition to  home    Patient/Family Anticipated Services at Transition      Transportation Anticipated  family or friend will provide       Discharge Needs Assessment    Current Outpatient/Agency/Support Group  other (see comments) Independent Living    Equipment Currently Used at Home  shower chair Will occasionally use a walking stick if he goes for a longer walk.    Concerns to be Addressed  discharge planning    Anticipated Changes Related to Illness  none    Equipment Needed After Discharge  none    Discharge Facility/Level of Care Needs  independent living facility    Current Discharge Risk  lives alone        Discharge Plan     Row Name 11/17/20 0942       Plan    Plan  Back to independent living vs skilled    Patient/Family in Agreement with Plan  yes    Plan Comments  Met with pt at . He lives at [facility]. He is ambulatory and independent of ADL's. He has had HH in the past but unsure who. He plans to return to independent living if able. PT/OT pending. CM will follow. Luli ext. [phone]    Final Discharge Disposition Code  30 - still a patient         Continued Care and Services - Admitted Since 11/16/2020    Coordination has not been started for this encounter.         Demographic Summary     Row Name 11/17/20 0938       General Information    Referral Source  admission list    Preferred Language  English     Used During This Interaction  no    General Information Comments  PCP is at [facility]. POA is daughter Mary Jo Montiel.       Contact Information    Permission Granted to Share Info With  ;family/designee Mary Jo Montiel-dtr        Functional Status     Row Name 11/17/20 0938       Functional Status    Usual Activity Tolerance  moderate    Current Activity Tolerance  moderate       Functional Status, IADL    Medications  independent    Meal Preparation  other (see comments) Provided by facility in his room    Housekeeping  independent    Laundry  independent    Shopping  assistive person       Employment/    Employment/ Comments  Has Medicare A&B and [employer]ARP. No concerns with medications.        Psychosocial    No documentation.       Abuse/Neglect    No documentation.       Legal    No documentation.       Substance Abuse    No documentation.       Patient Forms    No documentation.           Luli Leggett RN

## 2020-11-17 NOTE — THERAPY EVALUATION
Patient Name: Jesus Montiel Jr.  : 1934    MRN: [id number]                              Today's Date: 2020       Admit Date: 2020    Visit Dx:     ICD-10-CM ICD-9-CM   1. Cellulitis of right leg  L03.115 682.6     Patient Active Problem List   Diagnosis   • Cellulitis of right leg   • Failure of outpatient treatment   • Hypertension   • Hyperlipidemia   • A-fib (CMS/HCC)     Past Medical History:   Diagnosis Date   • Hyperlipidemia    • Hypertension    • Meniere disease    • Myocardial infarction (CMS/HCC)    • Tremor      Past Surgical History:   Procedure Laterality Date   • ABLATION OF DYSRHYTHMIC FOCUS     • CATARACT EXTRACTION, BILATERAL     • CORONARY ANGIOPLASTY WITH STENT PLACEMENT     • CORONARY ARTERY BYPASS BRING BACK FOR EXPLORATION     • HERNIA REPAIR     • INNER EAR SURGERY     • PACEMAKER IMPLANTATION       General Information     Row Name 20 1119          OT Time and Intention    Document Type  evaluation  -JY     Mode of Treatment  occupational therapy  -JY     Row Name 20 1119          General Information    Patient Profile Reviewed  yes  -JY     Prior Level of Function  independent:;all household mobility;gait;transfer;bed mobility;ADL's;feeding;grooming;dressing;bathing;max assist:;home management;cooking;cleaning pt receives A at facility for linens, dtg A with personal laundry, shopping; pt has been completing sponge bathing as of late  -J     Existing Precautions/Restrictions  fall;pacemaker;other (see comments) [place]. pt has pacemaker  -JY     Barriers to Rehab  previous functional deficit  -JY     Row Name 20 1119          Living Environment    Lives With  alone;facility resident;other (see comments) patient lives in [facility]  -JY     Row Name 20 1119          Home Main Entrance    Number of Stairs, Main Entrance  none  -JY     Stair Railings, Main Entrance  none  -JY     Row Name 20 1119          Stairs Within Home, Primary    Stairs, Within Home,  Primary  0  -JY     Number of Stairs, Within Home, Primary  none  -JY     Stair Railings, Within Home, Primary  none  -JY     Stairs Comment, Within Home, Primary  pt comments having option for stairs, however does not have to utilize  -JY     Row Name 11/17/20 1119          Cognition    Orientation Status (Cognition)  oriented x 4  -JY     Row Name 11/17/20 1119          Safety Issues, Functional Mobility    Safety Issues Affecting Function (Mobility)  insight into deficits/self-awareness;safety precaution awareness;safety precautions follow-through/compliance;sequencing abilities  -JY     Impairments Affecting Function (Mobility)  balance;endurance/activity tolerance;pain;strength  -JY     Comment, Safety Issues/Impairments (Mobility)  pt req'd A x 2 in fxl mobility for B orientation/support with HHA; cues for seq to improve fluidity, safety in task  -JY       User Key  (r) = Recorded By, (t) = Taken By, (c) = Cosigned By    Initials Name Provider Type    Lisa Benitez OT Occupational Therapist        Mobility/ADL's     Row Name 11/17/20 1126          Bed Mobility    Bed Mobility  rolling left;supine-sit;scooting/bridging  -JY     Rolling Left [place] (Bed Mobility)  supervision;verbal cues  -JY     Scooting/Bridging [place] (Bed Mobility)  supervision;verbal cues  -JY     Supine-Sit [place] (Bed Mobility)  supervision;verbal cues  -JY     Assistive Device (Bed Mobility)  bed rails;head of bed elevated  -JY     Comment (Bed Mobility)  pt grossly req'd spv for all bed mobility with need for increased time to complete megan advancing RLE  -JY     Row Name 11/17/20 1126          Transfers    Transfers  sit-stand transfer;bed-chair transfer  -JY     Comment (Transfers)  skilled cues for optimal hand placement for controlled ascend, descend, to reach back prior to sitting after ensured close proximity to seated surface  -JY     Bed-Chair [place] (Transfers)  minimum assist (75% patient  effort);verbal cues  -JY     Assistive Device (Bed-Chair Transfers)  other (see comments) UE support, gait belt  -JY     Sit-Stand [place] (Transfers)  minimum assist (75% patient effort);verbal cues  -JY     Row Name 11/17/20 1126          Sit-Stand Transfer    Assistive Device (Sit-Stand Transfers)  other (see comments) UE support, gait belt  -     Row Name 11/17/20 1126          Functional Mobility    Functional Mobility- Ind. Level  minimum assist (75% patient effort);2 person assist required;verbal cues required  -     Functional Mobility- Device  other (see comments) B HHA support, gait belt  -     Functional Mobility-Distance (Feet)  -- in room distances for ADL simulation  -     Functional Mobility- Safety Issues  step length decreased;weight-shifting ability decreased  -     Functional Mobility- Comment  pt presents with decreased step length and weight shifting cues for more safe and fluid mobility; req'd B HHA for B orientation and support  -[facility] Name 11/17/20 1126          Activities of Daily Living    BADL Assessment/Intervention  lower body dressing;upper body dressing  -[facility] Name 11/17/20 1126          Mobility    Extremity Weight-bearing Status  right lower extremity  -JY     Right Lower Extremity (Weight-bearing Status)  weight-bearing as tolerated (WBAT)  -[facility] Name 11/17/20 1126          Lower Body Dressing Assessment/Training    [place] Level (Lower Body Dressing)  doff;don;socks;dependent (less than 25% patient effort);verbal cues  -JY     Position (Lower Body Dressing)  edge of bed sitting  -J     Comment (Lower Body Dressing)  pt attempted to demo crossed leg tech for d/d more proximally however pt u/a to sustain position without increased pain at B LE, R>L and pt with dep care  -     Row Name 11/17/20 1126          Upper Body Dressing Assessment/Training    [place] Level (Upper Body Dressing)  don;pajama/robe;contact guard assist;verbal cues  -JY      Position (Upper Body Dressing)  edge of bed sitting  -JY     Comment (Upper Body Dressing)  CGA for posterior mgmt of gown, otherwise able to thread, manage  -JY       User Key  (r) = Recorded By, (t) = Taken By, (c) = Cosigned By    Initials Name Provider Type    Lisa Benitez OT Occupational Therapist        Obj/Interventions     Row Name 11/17/20 1135          Sensory Assessment (Somatosensory)    Sensory Assessment (Somatosensory)  bilateral UE  -JY     Bilateral UE Sensory Assessment  general sensation;light touch awareness  -JY     Row Name 11/17/20 1135          Sensory Interventions    Comment, Sensory Intervention  pt denies any numbness, tingling and able to identify all LT stimuli at BUEs  -JY     Row Name 11/17/20 1135          Vision Assessment/Intervention    Visual Impairment/Limitations  WFL  -JY     Row Name 11/17/20 1135          Range of Motion Comprehensive    General Range of Motion  bilateral upper extremity ROM WFL  -J     Comment, General Range of Motion  BUE AROM WFL  -JY     Row Name 11/17/20 1135          Strength Comprehensive (MMT)    General Manual Muscle Testing (MMT) Assessment  no strength deficits identified  -JY     Comment, General Manual Muscle Testing (MMT) Assessment  BUEs grossly 4+/5 per MMT  -Y     Row Name 11/17/20 1135          Balance    Balance Assessment  sitting static balance;sitting dynamic balance;standing static balance;standing dynamic balance  -JY     Static Sitting Balance  WFL;unsupported;sitting, edge of bed;supported;sitting in chair  -JY     Dynamic Sitting Balance  mild impairment;unsupported;sitting, edge of bed;other (see comments) posterior and lateral lean with attempt to distally reach and faciliate more proximal reach to LEs for LBD  -JY     Static Standing Balance  WFL;supported;standing  -JY     Dynamic Standing Balance  mild impairment;supported;standing;other (see comments) fxl transfer, mobility  -JY     Balance Interventions   sitting;standing;static;dynamic;sit to stand;supported;occupation based/functional task  -JY     Comment, Balance  pt did not demonstrate any LOB with seated or standing tasks, did present with mild posterior and lateral lean with dynamic sitting and further req'd A x 2 for standing dynamic tasks for seq, weight shifting and B orientation  -JY       User Key  (r) = Recorded By, (t) = Taken By, (c) = Cosigned By    Initials Name Provider Type    Lisa Benitez OT Occupational Therapist        Goals/Plan     Row Name 11/17/20 1146          Transfer Goal 1 (OT)    Activity/Assistive Device (Transfer Goal 1, OT)  sit-to-stand/stand-to-sit;bed-to-chair/chair-to-bed;toilet;commode;walker, rolling  -JY     [place] Level/Cues Needed (Transfer Goal 1, OT)  contact guard assist;verbal cues required  -JY     Time Frame (Transfer Goal 1, OT)  long term goal (LTG);by discharge  -JY     Progress/Outcome (Transfer Goal 1, OT)  goal ongoing  -JY     Row Name 11/17/20 1146          Dressing Goal 1 (OT)    Activity/Device (Dressing Goal 1, OT)  lower body dressing;other (see comments) d/d pants, undergarments, socks with AE PRN, AD as recommended for standing components  -JY     [place]/Cues Needed (Dressing Goal 1, OT)  moderate assist (50-74% patient effort);verbal cues required  -JY     Time Frame (Dressing Goal 1, OT)  long term goal (LTG);by discharge  -JY     Progress/Outcome (Dressing Goal 1, OT)  goal ongoing  -JY     Row Name 11/17/20 1146          Toileting Goal 1 (OT)    Activity/Device (Toileting Goal 1, OT)  adjust/manage clothing;perform perineal hygiene;commode;grab bar/safety frame;raised toilet seat  -JY     [place] Level/Cues Needed (Toileting Goal 1, OT)  minimum assist (75% or more patient effort);verbal cues required  -JY     Time Frame (Toileting Goal 1, OT)  long term goal (LTG);by discharge  -JY     Progress/Outcome (Toileting Goal 1, OT)  goal ongoing  -JY       User Key  (r) = Recorded By,  (t) = Taken By, (c) = Cosigned By    Initials Name Provider Type    Lisa Benitez, ANTON Occupational Therapist        Clinical Impression     Row Name 11/17/20 1139          Pain Assessment    Additional Documentation  Pain Scale: Numbers Pre/Post-Treatment (Group)  -JBETHANY     Row Name 11/17/20 1139          Pain Scale: Numbers Pre/Post-Treatment    Pretreatment Pain Rating  0/10 - no pain  -JY     Posttreatment Pain Rating  3/10  -JY     Pain Location - Side  Right  -JY     Pain Location - Orientation  lower  -JY     Pain Location  extremity  -JY     Pre/Posttreatment Pain Comment  pt pain increased to 3/10 post fxl mobility  -JY     Pain Intervention(s)  Repositioned;Ambulation/increased activity;Elevated  -Y     Row Name 11/17/20 1139          Plan of Care Review    Plan of Care Reviewed With  patient  -MIN     Progress  improving  -JY     Outcome Summary  OT IE completed post chart review. Pt presents with decreased I in ADLs, related t/f compared to PLOF. Pt completed bed mobility with HOB elevated, bed rail use and increased time megan to advance RLE to EOB however with spv. Completed sit < > Stand and bed > recliner t/f with min A x 1 and fxl mobility in room with min A x 2 with B HHA for B orientation and support. Pt pain increased to grossly 3/10 post fxl mobility. Pt able to complete UBD i.e. donning gown with CGA for posterior mgmt, otherwise grossly I in UBD and req'd dep care for d/d socks. Pt attempted to complete modified position for improved proximal reach for d/d and u/a to sustain with posterior and lateral lean. Pt collectively hindered by decreased fxl endurance, impaired balance, decreased distal reach all supporting IPOT POC and further recommendation for SNF for further rehab before return to [facility].  -JY     Row Name 11/17/20 1139          Therapy Assessment/Plan (OT)    Patient/Family Therapy Goal Statement (OT)  to increase I in ADLs, related t/f, return to PLOF  -JY     Rehab Potential (OT)   good, to achieve stated therapy goals  -JY     Criteria for Skilled Therapeutic Interventions Met (OT)  yes;skilled treatment is necessary  -JY     Therapy Frequency (OT)  daily  -JY     Row Name 11/17/20 1139          Therapy Plan Review/Discharge Plan (OT)    Equipment Needs Upon Discharge (OT)  dressing equipment;bathing equipment  -JY     Anticipated Discharge Disposition (OT)  skilled nursing facility  -JY     Row Name 11/17/20 1139          Vital Signs    Pre Systolic BP Rehab  101  -JY     Pre Treatment Diastolic BP  53  -JY     Post Systolic BP Rehab  108  -JY     Post Treatment Diastolic BP  57  -JY     Pretreatment Heart Rate (beats/min)  70  -JY     Posttreatment Heart Rate (beats/min)  60  -JY     Pre SpO2 (%)  96  -JY     O2 Delivery Pre Treatment  room air  -JY     O2 Delivery Intra Treatment  room air  -JY     Post SpO2 (%)  98  -JY     O2 Delivery Post Treatment  room air  -JY     Pre Patient Position  Supine  -JY     Intra Patient Position  Standing  -JY     Post Patient Position  Sitting  -JY     Row Name 11/17/20 1139          Positioning and Restraints    Pre-Treatment Position  in bed  -JY     Post Treatment Position  chair  -JY     In Chair  notified nsg;reclined;call light within reach;encouraged to call for assist;exit alarm on;waffle cushion;legs elevated;RLE elevated  -JY       User Key  (r) = Recorded By, (t) = Taken By, (c) = Cosigned By    Initials Name Provider Type    Lisa Benitez, ANTON Occupational Therapist        Outcome Measures     Row Name 11/17/20 1148          How much help from another is currently needed...    Putting on and taking off regular lower body clothing?  2  -JY     Bathing (including washing, rinsing, and drying)  2  -JY     Toileting (which includes using toilet bed pan or urinal)  2  -JY     Putting on and taking off regular upper body clothing  3  -JY     Taking care of personal grooming (such as brushing teeth)  3  -JY     Eating meals  4  -JY     AM-PAC 6  Clicks Score (OT)  16  -JY     Row Name 11/17/20 1148          Functional Assessment    Outcome Measure Options  AM-PAC 6 Clicks Daily Activity (OT)  -JY       User Key  (r) = Recorded By, (t) = Taken By, (c) = Cosigned By    Initials Name Provider Type    Lisa Benitez OT Occupational Therapist        Occupational Therapy Education                 Title: PT OT SLP Therapies (In Progress)     Topic: Occupational Therapy (In Progress)     Point: ADL training (In Progress)     Description:   Instruct learner(s) on proper safety adaptation and remediation techniques during self care or transfers.   Instruct in proper use of assistive devices.              Learning Progress Summary           Patient Acceptance, E,D, NR by MIN at 11/17/2020 0928                   Point: Precautions (In Progress)     Description:   Instruct learner(s) on prescribed precautions during self-care and functional transfers.              Learning Progress Summary           Patient Acceptance, E,D, NR by MIN at 11/17/2020 0928                   Point: Body mechanics (In Progress)     Description:   Instruct learner(s) on proper positioning and spine alignment during self-care, functional mobility activities and/or exercises.              Learning Progress Summary           Patient Acceptance, E,D, NR by MIN at 11/17/2020 0928                               User Key     Initials Effective Dates Name Provider Type Discipline    MIN 01/29/20 -  Lisa Haddad OT Occupational Therapist OT              OT Recommendation and Plan  Therapy Frequency (OT): daily  Plan of Care Review  Plan of Care Reviewed With: patient  Progress: improving  Outcome Summary: OT IE completed post chart review. Pt presents with decreased I in ADLs, related t/f compared to PLOF. Pt completed bed mobility with HOB elevated, bed rail use and increased time megan to advance RLE to EOB however with spv. Completed sit < > Stand and bed > recliner t/f with min A x 1 and fxl  mobility in room with min A x 2 with B HHA for B orientation and support. Pt pain increased to grossly 3/10 post fxl mobility. Pt able to complete UBD i.e. donning gown with CGA for posterior mgmt, otherwise grossly I in UBD and req'd dep care for d/d socks. Pt attempted to complete modified position for improved proximal reach for d/d and u/a to sustain with posterior and lateral lean. Pt collectively hindered by decreased fxl endurance, impaired balance, decreased distal reach all supporting IPOT POC and further recommendation for SNF for further rehab before return to [facility].     Time Calculation:   Time Calculation- OT     Row Name 11/17/20 1150             Time Calculation- OT    OT Start Time  0928 -JY      OT Received On  11/17/20  -JY      OT Goal Re-Cert Due Date  11/27/20  -JY        User Key  (r) = Recorded By, (t) = Taken By, (c) = Cosigned By    Initials Name Provider Type    Lisa Benitez OT Occupational Therapist        Therapy Charges for Today     Code Description Service Date Service Provider Modifiers Qty    [id number]  OT EVAL LOW COMPLEXITY 4 11/17/2020 Lisa Haddad OT GO 1               Lisa Haddad OT  11/17/2020

## 2020-11-17 NOTE — H&P
[facility] Medicine Services  HISTORY AND PHYSICAL    Patient Name: Jesus Montiel Jr.  : 1934  MRN: [id number]  Primary Care Physician: Provider, No Known  Date of admission: 2020      Subjective   Subjective     Chief Complaint:  Right lower extremity erythema     HPI:  Jesus Montiel Jr. is a 86 y.o. male with a PMHx of A.fib on Pradaxa, CAD and HLD who presented to [facility] ED c/o right lower extremity erythema. The patient reports he developed a blister on the anterior right tibia last week . The blister grew larger and eventually started leaking fluid. He was started on Keflex and completed 6 days of the antibiotic. The patient woke up this morning and noticed erythema, warmth and pain spreading up his right leg. Mr. Montiel denies fever or chills. He denies SOB, cough, or congestion. He denies N/V/D or abdominal pain. He reports a good appetite. No known exposure to COVID-19. The patient was scheduled to see a vascular surgeon tomorrow to look at his legs. The patient's daughter at the bedside states that since the patient moved to his assisted living facility, he has been sleeping in a chair and not elevating his legs. Additionally, the patient has not been bathing and she is concerned about his hygiene. No use of tobacco. He admits to 1 drink of wine per night. The patient lives at [facility] [place]. While in the ED, the patient's vitals have all been stable. Labs revealed Bun/Cr 30.9, plts 114.  He was started on Vancomycin in the ED. The patient will be admitted to the hospitalist service for further medical management.     Current COVID Risks are:  [] Fever []  Cough [] Shortness of breath [] Fatigue [] Change in taste or smell    [] Exposure to COVID positive patient  [] High risk facility   [x]  NONE    Review of Systems   Constitutional: Negative for activity change, appetite change, chills, diaphoresis, fatigue, fever and unexpected weight  change.   HENT: Negative for congestion, sore throat and trouble swallowing.    Eyes: Negative for pain and visual disturbance.   Respiratory: Negative for cough, shortness of breath and wheezing.    Cardiovascular: Positive for leg swelling (Right). Negative for chest pain and palpitations.   Gastrointestinal: Negative for abdominal pain, blood in stool, constipation, diarrhea, nausea and vomiting.   Genitourinary: Negative for difficulty urinating and dysuria.   Musculoskeletal: Negative for back pain and gait problem.   Skin: Positive for wound (Right leg). Negative for rash.   Neurological: Negative for dizziness, syncope, speech difficulty, weakness and headaches.   Hematological: Negative for adenopathy. Does not bruise/bleed easily.   Psychiatric/Behavioral: Negative for agitation and confusion.      All other systems reviewed and are negative.     Personal History     Past Medical History:   Diagnosis Date   • Hyperlipidemia    • Hypertension    • Meniere disease    • Myocardial infarction (CMS/HCC)    • Tremor        Past Surgical History:   Procedure Laterality Date   • ABLATION OF DYSRHYTHMIC FOCUS     • CATARACT EXTRACTION, BILATERAL     • CORONARY ANGIOPLASTY WITH STENT PLACEMENT     • CORONARY ARTERY BYPASS BRING BACK FOR EXPLORATION     • HERNIA REPAIR     • INNER EAR SURGERY     • PACEMAKER IMPLANTATION         Family History: family history is not on file. Otherwise pertinent FHx was reviewed and unremarkable.     Social History:  reports that he has never smoked. He has never used smokeless tobacco. He reports current alcohol use of about 4.0 standard drinks of alcohol per week. He reports that he does not use drugs.  Social History     Social History Narrative   • Not on file       Medications:  Available home medication information reviewed.  (Not in a hospital admission)      No Known Allergies    Objective   Objective     Vital Signs:   Temp:  [97.9 °F (36.6 °C)] 97.9 °F (36.6 °C)  Heart Rate:   [70] 70  Resp:  [16-18] 16  BP: ()/(45-93) 125/63        Physical Exam   Constitutional: Awake, alert, lying in bed, pleasant   Eyes: PERRLA, sclerae anicteric, no conjunctival injection  HENT: NCAT, mucous membranes moist, hard of hearing   Neck: Supple, no thyromegaly, no lymphadenopathy, trachea midline  Respiratory: Clear to auscultation bilaterally, nonlabored respirations   Cardiovascular: RRR, no murmurs appreciated, palpable pedal pulses bilaterally, pacemaker   Gastrointestinal: Positive bowel sounds, soft, nontender, no distention or guarding   Musculoskeletal: Right lower extremity exhibits 2+ pitting edema, no clubbing or cyanosis to extremities  Psychiatric: Appropriate affect, cooperative  Neurologic: Oriented x 3, strength symmetric in all extremities, Cranial Nerves grossly intact to confrontation, speech clear  Skin: Large opened bulla on anterior aspect of right tibia, erythema and warmth surrounding wound extending up to the knee    Results Reviewed:  I have personally reviewed most recent indicated data and agree with findings including:  [x]  Laboratory  [x]  Radiology  []  EKG/Telemetry  []  Pathology  []  Cardiac/Vascular Studies  []  Old records  []  Other:  Most pertinent findings include: none      Results from last 7 days   Lab Units 11/16/20  1644   WBC 10*3/mm3 6.90   HEMOGLOBIN g/dL 13.1   HEMATOCRIT % 41.1   PLATELETS 10*3/mm3 114*     Results from last 7 days   Lab Units 11/16/20  1644   SODIUM mmol/L 141   POTASSIUM mmol/L 4.7   CHLORIDE mmol/L 106   CO2 mmol/L 29.0   BUN mg/dL 30*   CREATININE mg/dL 0.97   GLUCOSE mg/dL 103*   CALCIUM mg/dL 9.5   ALT (SGPT) U/L 14   AST (SGOT) U/L 20   LACTATE mmol/L 1.0     Estimated Creatinine Clearance: 49.1 mL/min (by C-G formula based on SCr of 0.97 mg/dL).  Brief Urine Lab Results     None        Imaging Results (Last 24 Hours)     ** No results found for the last 24 hours. **             Assessment/Plan   Assessment & Plan     Active  Hospital Problems    Diagnosis POA   • Cellulitis of right leg [L03.115] Yes   • Failure of outpatient treatment [Z78.9] Yes   • A-fib (CMS/HCC) [I48.91] Yes   • Hyperlipidemia [E78.5] Yes     Jesus Montiel Jr. is a 86 y.o. male with a PMHx of A.fib on Pradaxa, CAD and HLD who presented to [facility] ED c/o right lower extremity erythema w/ an open wound.     1. Cellulitis of RLE  2. Failure of outpatient treatment   -- started on Vancomycin in the ED, will continue for now , add rocephin  -- blood cultures x 2   -- consult WOC   -- gentle fluids overnight, patient appears dry   -- Consider ID consultation    3. Thrombocytopenia   -- monitor     4. Atrial fibrillation / Pacemaker   -- CHADS-VASc 4  -- continue Pradaxa   -- no longer taking Amiodarone   -- rate controlled     5. Hyperlipidemia   -- continue statin    DVT prophylaxis:  Pradaxa     CODE STATUS:  Patient's daughter is POA  Code Status and Medical Interventions:   Ordered at: 11/16/20 2001     Level Of Support Discussed With:    Health Care Surrogate     Code Status:    CPR     Medical Interventions (Level of Support Prior to Arrest):    Full     Electronically signed by Kaya Means PA-C, 11/16/20, 7:40 PM EST.    Attending   Admission Attestation       I have seen and examined the patient, performing an independent face-to-face diagnostic evaluation with plan of care reviewed and developed with the advanced practice clinician (APC).      Brief Summary Statement:   Jesus Montiel Jr. is a 86 y.o. male with PMH significant for atrial fib (on pradaxa), CAD, and HLD who presented to the ER today with cellulitis of the RLE with failure to improve on PO cephalexin.  The patient reports developing a blister overlying the right anterior tibia on 11/9/20.  The blister has grown and eventually leaked/ruptred.  He was put on keflex by his PCP and has completed 6 days of therapy.  The redness has not improved, and this morning, the patient noted that erythema, redness and  warmth has spread superiorly up his leg.  He has had some chills, but no known fevers.      The patient is a resident at [facility].  The patient's daughter is concerned about his ability to care for himself as he has had poor hygiene since moving to assisted living.  He was scheduled to see vascular surgery tomorrow.      Labs are reassuring.  Lexar covid test pending.    Remainder of detailed HPI is as noted by APC and has been reviewed and/or edited by me for completeness.    Attending Physical Exam:  Constitutional: Awake, alert, pleasant  Eyes: PERRLA, sclerae anicteric, no conjunctival injection  HENT: NCAT, mucous membranes moist  Neck: Supple, no thyromegaly, no lymphadenopathy, trachea midline  Respiratory: Clear to auscultation bilaterally, nonlabored respirations   Cardiovascular: RRR, no murmurs, rubs, or gallops, palpable pedal pulses bilaterally  Gastrointestinal: Positive bowel sounds, soft, nontender, nondistended  Musculoskeletal: 2+ RLE edema, negative deo's sign, no posterior tenderness or cord, no clubbing or cyanosis to extremities  Psychiatric: Appropriate affect, cooperative  Neurologic: Oriented x 3, strength symmetric in all extremities, Cranial Nerves grossly intact to confrontation, speech clear  Skin: RLE reddened with large open area anterior right LE overlying tibia.      Brief Assessment/Plan :  See detailed assessment and plan developed with APC which I have reviewed and/or edited for completeness.        Admission Status: I believe that this patient meets inpatient criteria due to RLE celluitis with failure to improve on outpatient therapy      Sandy Lemus MD  11/16/20

## 2020-11-17 NOTE — PLAN OF CARE
Problem: Adult Inpatient Plan of Care  Goal: Plan of Care Review  Recent Flowsheet Documentation  Taken 11/17/2020 1139 by Lisa Haddad OT  Progress: improving  Plan of Care Reviewed With: patient  Outcome Summary: OT IE completed post chart review. Pt presents with decreased I in ADLs, related t/f compared to PLOF. Pt completed bed mobility with HOB elevated, bed rail use and increased time megan to advance RLE to EOB however with spv. Completed sit < > Stand and bed > recliner t/f with min A x 1 and fxl mobility in room with min A x 2 with B HHA for B orientation and support. Pt pain at RLE increased to grossly 3/10 post fxl mobility. Pt able to complete UBD i.e. donning gown with CGA for posterior mgmt, otherwise grossly I in UBD and req'd dep care for d/d socks. Pt attempted to complete modified position for improved proximal reach for d/d and u/a to sustain with posterior and lateral lean. Pt collectively hindered by decreased fxl endurance, impaired balance, decreased distal reach all supporting IPOT POC and further recommendation for SNF for further rehab before return to [facility].

## 2020-11-17 NOTE — THERAPY EVALUATION
Patient Name: Jesus Montiel Jr.  : 1934    MRN: [id number]                              Today's Date: 2020       Admit Date: 2020    Visit Dx:     ICD-10-CM ICD-9-CM   1. Cellulitis of right leg  L03.115 682.6     Patient Active Problem List   Diagnosis   • Cellulitis of right leg   • Failure of outpatient treatment   • Hypertension   • Hyperlipidemia   • A-fib (CMS/HCC)     Past Medical History:   Diagnosis Date   • Hyperlipidemia    • Hypertension    • Meniere disease    • Myocardial infarction (CMS/HCC)    • Tremor      Past Surgical History:   Procedure Laterality Date   • ABLATION OF DYSRHYTHMIC FOCUS     • CATARACT EXTRACTION, BILATERAL     • CORONARY ANGIOPLASTY WITH STENT PLACEMENT     • CORONARY ARTERY BYPASS BRING BACK FOR EXPLORATION     • HERNIA REPAIR     • INNER EAR SURGERY     • PACEMAKER IMPLANTATION       General Information     Row Name 20          Physical Therapy Time and Intention    Document Type  evaluation  -NS     Mode of Treatment  physical therapy  -NS     Row Name 20          General Information    Patient Profile Reviewed  yes  -NS     Prior Level of Function  independent:;all household mobility;gait;transfer;bed mobility;ADL's Pt ambulates with hiking pole at baseline.  -NS     Existing Precautions/Restrictions  fall;other (see comments) RLE cellulitis, [place]  -NS     Barriers to Rehab  medically complex  -NS     Row Name 20          Living Environment    Lives With  alone;facility resident [facility]  -NS     Row Name 20          Home Main Entrance    Number of Stairs, Main Entrance  none  -NS     Stair Railings, Main Entrance  none  -NS     Row Name 20          Stairs Within Home, Primary    Number of Stairs, Within Home, Primary  none  -NS     Row Name 20          Cognition    Orientation Status (Cognition)  oriented x 4  -NS     Row Name 20          Safety Issues, Functional Mobility    Safety  Issues Affecting Function (Mobility)  insight into deficits/self-awareness;safety precaution awareness;safety precautions follow-through/compliance;sequencing abilities  -NS     Impairments Affecting Function (Mobility)  balance;endurance/activity tolerance;pain;strength;coordination;motor planning  -NS       User Key  (r) = Recorded By, (t) = Taken By, (c) = Cosigned By    Initials Name Provider Type    Inna Rubin, PT Physical Therapist        Mobility     Row Name 11/17/20 0924          Bed Mobility    Bed Mobility  supine-sit  -NS     Supine-Sit [place] (Bed Mobility)  supervision  -NS     Assistive Device (Bed Mobility)  bed rails;head of bed elevated  -NS     Comment (Bed Mobility)  Pt able to complete with increased time and effort and without hands on assist from PT.  -NS     Row Name 11/17/20 0924          Transfers    Comment (Transfers)  VCs for hand and foot placement.  -NS     Row Name 11/17/20 0924          Sit-Stand Transfer    Sit-Stand [place] (Transfers)  minimum assist (75% patient effort);verbal cues  -NS     Assistive Device (Sit-Stand Transfers)  other (see comments) gait belt  -NS     Row Name 11/17/20 0924          Gait/Stairs (Locomotion)    [place] Level (Gait)  minimum assist (75% patient effort);2 person assist;verbal cues  -NS     Assistive Device (Gait)  other (see comments) gait belt  -NS     Distance in Feet (Gait)  15  -NS     Deviations/Abnormal Patterns (Gait)  base of support, narrow;gris decreased;festinating/shuffling;gait speed decreased;stride length decreased  -NS     Bilateral Gait Deviations  heel strike decreased;weight shift ability decreased  -NS     Comment (Gait/Stairs)  Patient ambulated at slow pace with UE support and gait belt, demonstrating narrow MARCELO and short, shuffled steps. Pt with mild unsteadiness, would benefit from using RW during next session.  -NS       User Key  (r) = Recorded By, (t) = Taken By, (c) = Cosigned By    Initials Name  Provider Type    Inna Rubin PT Physical Therapist        Obj/Interventions     Row Name 11/17/20 0924          Range of Motion Comprehensive    General Range of Motion  bilateral lower extremity ROM WFL  -NS     Comment, General Range of Motion  Actively WFL  -NS     [facility] Name 11/17/20 0924          Strength Comprehensive (MMT)    General Manual Muscle Testing (MMT) Assessment  lower extremity strength deficits identified  -NS     Comment, General Manual Muscle Testing (MMT) Assessment  BLEs: grossly 4/5  -NS     Row Name 11/17/20 0924          Balance    Balance Assessment  sitting static balance;sitting dynamic balance;standing dynamic balance;standing static balance  -NS     Static Sitting Balance  WFL;unsupported;sitting, edge of bed  -NS     Dynamic Sitting Balance  mild impairment;unsupported;sitting, edge of bed  -NS     Static Standing Balance  mild impairment;supported;standing  -NS     Dynamic Standing Balance  mild impairment;supported;standing  -NS     [facility] Name 11/17/20 0924          Sensory Assessment (Somatosensory)    Sensory Assessment (Somatosensory)  LE sensation intact  -NS       User Key  (r) = Recorded By, (t) = Taken By, (c) = Cosigned By    Initials Name Provider Type    Inna Rubin PT Physical Therapist        Goals/Plan     [facility] Name 11/17/20 0924          Bed Mobility Goal 1 (PT)    Activity/Assistive Device (Bed Mobility Goal 1, PT)  sit to supine/supine to sit  -NS     [place] Level/Cues Needed (Bed Mobility Goal 1, PT)  modified independence  -NS     Time Frame (Bed Mobility Goal 1, PT)  long term goal (LTG);2 weeks  -NS     [facility] Name 11/17/20 0924          Transfer Goal 1 (PT)    Activity/Assistive Device (Transfer Goal 1, PT)  bed-to-chair/chair-to-bed;sit-to-stand/stand-to-sit  -NS     [place] Level/Cues Needed (Transfer Goal 1, PT)  supervision required  -NS     Time Frame (Transfer Goal 1, PT)  long term goal (LTG);2 weeks  -NS     [facility] Name 11/17/20 0924           Gait Training Goal 1 (PT)    Activity/Assistive Device (Gait Training Goal 1, PT)  gait (walking locomotion);assistive device use  -NS     [place] Level (Gait Training Goal 1, PT)  contact guard assist  -NS     Distance (Gait Training Goal 1, PT)  150  -NS     Time Frame (Gait Training Goal 1, PT)  long term goal (LTG);2 weeks  -NS       User Key  (r) = Recorded By, (t) = Taken By, (c) = Cosigned By    Initials Name Provider Type    Inna Rubin, PT Physical Therapist        Clinical Impression     Row Name 11/17/20 0924          Pain    Additional Documentation  Pain Scale: Numbers Pre/Post-Treatment (Group)  -NS     Row Name 11/17/20 0924          Pain Scale: Numbers Pre/Post-Treatment    Pretreatment Pain Rating  0/10 - no pain  -NS     Posttreatment Pain Rating  3/10  -NS     Pain Location - Side  Right  -NS     Pain Location - Orientation  anterior  -NS     Pain Location  extremity  -NS     Pain Intervention(s)  Repositioned;Ambulation/increased activity;Elevated  -NS     Row Name 11/17/20 0924          Plan of Care Review    Plan of Care Reviewed With  patient  -NS     Progress  no change PT eval  -NS     Outcome Summary  Patient presents with generalized weakness, decreased balance, and pain affecting functional mobility. He transferred supine>sit with sup, STS with Min A, and ambulated 15ft with HHA and Min A x2, demonstrating some unsteadiness and narrow MARCELO. Recommend trialing RW next session. Skilled IP PT warranted. Recommend SNF at discharge to promote PLOF.  -NS     Row Name 11/17/20 0924          Therapy Assessment/Plan (PT)    Patient/Family Therapy Goals Statement (PT)  To have better balance.  -NS     Rehab Potential (PT)  good, to achieve stated therapy goals  -NS     Criteria for Skilled Interventions Met (PT)  yes;meets criteria;skilled treatment is necessary  -NS     Predicted Duration of Therapy Intervention (PT)  2 weeks  -NS     Row Name 11/17/20 0924          Vital Signs    Pre  Systolic BP Rehab  101  -NS     Pre Treatment Diastolic BP  53  -NS     Pretreatment Heart Rate (beats/min)  70  -NS     Posttreatment Heart Rate (beats/min)  72  -NS     Pre SpO2 (%)  96  -NS     O2 Delivery Pre Treatment  room air  -NS     Post SpO2 (%)  99  -NS     O2 Delivery Post Treatment  room air  -NS     Pre Patient Position  Supine  -NS     Intra Patient Position  Standing  -NS     Post Patient Position  Sitting  -NS     Row Name 11/17/20 0924          Positioning and Restraints    Pre-Treatment Position  in bed  -NS     Post Treatment Position  chair  -NS     In Chair  notified nsg;reclined;call light within reach;encouraged to call for assist;exit alarm on;waffle cushion;legs elevated  -NS       User Key  (r) = Recorded By, (t) = Taken By, (c) = Cosigned By    Initials Name Provider Type    Inna Rubin PT Physical Therapist        Outcome Measures     Row Name 11/17/20 0924          How much help from another person do you currently need...    Turning from your back to your side while in flat bed without using bedrails?  4  -NS     Moving from lying on back to sitting on the side of a flat bed without bedrails?  3  -NS     Moving to and from a bed to a chair (including a wheelchair)?  3  -NS     Standing up from a chair using your arms (e.g., wheelchair, bedside chair)?  3  -NS     Climbing 3-5 steps with a railing?  2  -NS     To walk in hospital room?  2  -NS     AM-PAC 6 Clicks Score (PT)  17  -NS     Row Name 11/17/20 0924          Functional Assessment    Outcome Measure Options  AM-PAC 6 Clicks Basic Mobility (PT)  -NS       User Key  (r) = Recorded By, (t) = Taken By, (c) = Cosigned By    Initials Name Provider Type    Inna Rubin PT Physical Therapist        Physical Therapy Education                 Title: PT OT SLP Therapies (In Progress)     Topic: Physical Therapy (In Progress)     Point: Mobility training (Done)     Learning Progress Summary           Patient Acceptance, E, VINAY,NR  by NS at 11/17/2020 1145    Comment: Patient was educated about PT POC, sequencing mobility, and importance of OOB activity.                   Point: Home exercise program (Not Started)     Learner Progress:  Not documented in this visit.          Point: Body mechanics (Done)     Learning Progress Summary           Patient Acceptance, E, VU,NR by NS at 11/17/2020 1145    Comment: Patient was educated about PT POC, sequencing mobility, and importance of OOB activity.                   Point: Precautions (Done)     Learning Progress Summary           Patient Acceptance, E, VU,NR by NS at 11/17/2020 1145    Comment: Patient was educated about PT POC, sequencing mobility, and importance of OOB activity.                               User Key     Initials Effective Dates Name Provider Type Discipline    NS 09/10/19 -  Inna Sanchez PT Physical Therapist PT              PT Recommendation and Plan  Planned Therapy Interventions (PT): balance training, bed mobility training, gait training, home exercise program, neuromuscular re-education, transfer training, patient/family education, strengthening  Plan of Care Reviewed With: patient  Progress: no change(PT eval)  Outcome Summary: Patient presents with generalized weakness, decreased balance, and pain affecting functional mobility. He transferred supine>sit with sup, STS with Min A, and ambulated 15ft with HHA and Min A x2, demonstrating some unsteadiness and narrow MARCELO. Recommend trialing RW next session. Skilled IP PT warranted. Recommend SNF at discharge to promote PLOF.     Time Calculation:   PT Charges     Row Name 11/17/20 0924             Time Calculation    Start Time  0924  -NS      PT Received On  11/17/20  -NS      PT Goal Re-Cert Due Date  11/27/20  -NS        User Key  (r) = Recorded By, (t) = Taken By, (c) = Cosigned By    Initials Name Provider Type    Inna Rubin PT Physical Therapist        Therapy Charges for Today     Code Description Service Date  Service Provider Modifiers Qty    [id number] HC PT EVAL LOW COMPLEXITY 4 11/17/2020 Inna Sanchez, PT GP 1          PT G-Codes  Outcome Measure Options: AM-PAC 6 Clicks Basic Mobility (PT)  AM-PAC 6 Clicks Score (PT): 17    Inna Sanchez, PT  11/17/2020

## 2020-11-17 NOTE — PROGRESS NOTES
[facility] Medicine Services  PROGRESS NOTE    Patient Name: Jesus Montiel Jr.  : 1934  MRN: [id number]    Date of Admission: 2020  Primary Care Physician: Provider, No Known    Subjective   Subjective     CC:  Cellulitis     HPI:  States legs do not feel or look much better. Intermittent pain. States they got worse while on PO antibiotics. Asked me to call his daughter which I did with no answer.     Review of Systems  Gen- No fevers, chills  CV- No chest pain, palpitations  Resp- No cough, dyspnea  GI- No N/V/D, abd pain     Objective   Objective     Vital Signs:   Temp:  [97.5 °F (36.4 °C)-98.7 °F (37.1 °C)] 98.7 °F (37.1 °C)  Heart Rate:  [70] 70  Resp:  [16-18] 16  BP: ()/(45-93) 97/57        Physical Exam:  Constitutional: No acute distress, awake, alert  HENT: NCAT, mucous membranes moist  Respiratory: Clear to auscultation bilaterally, respiratory effort normal   Cardiovascular: RRR, no murmurs  Gastrointestinal: Positive bowel sounds, soft, nontender, nondistended  Musculoskeletal: right leg wrapped with serous fluid; erythema up to knees   Psychiatric: Appropriate affect, cooperative  Neurologic: Oriented x 3, strength symmetric in all extremities, Cranial Nerves grossly intact to confrontation, speech clear  Skin: No rashes      Results Reviewed:  Results from last 7 days   Lab Units 20  0833 20  1644   WBC 10*3/mm3 6.24 6.90   HEMOGLOBIN g/dL 12.9* 13.1   HEMATOCRIT % 39.3 41.1   PLATELETS 10*3/mm3 94* 114*     Results from last 7 days   Lab Units 20  0833 20  1644   SODIUM mmol/L 140 141   POTASSIUM mmol/L 4.3 4.7   CHLORIDE mmol/L 107 106   CO2 mmol/L 20.0* 29.0   BUN mg/dL 20 30*   CREATININE mg/dL 0.64* 0.97   GLUCOSE mg/dL 82 103*   CALCIUM mg/dL 8.3* 9.5   ALT (SGPT) U/L  --  14   AST (SGOT) U/L  --  20     Estimated Creatinine Clearance: 59.5 mL/min (A) (by C-G formula based on SCr of 0.64 mg/dL (L)).    Microbiology Results  Abnormal     Procedure Component Value - Date/Time    COVID PRE-OP / PRE-PROCEDURE SCREENING ORDER (NO ISOLATION) - Swab, Nasopharynx [[id number]] Collected: 11/16/20 2036    Lab Status: Final result Specimen: Swab from Nasopharynx Updated: 11/17/20 1248    Narrative:      The following orders were created for panel order COVID PRE-OP / PRE-PROCEDURE SCREENING ORDER (NO ISOLATION) - Swab, Nasopharynx.  Procedure                               Abnormality         Status                     ---------                               -----------         ------                     COVID-19,[facility], NP ...[[id number]]                      Final result                 Please view results for these tests on the individual orders.    COVID-19,[facility], NP SWAB IN [place] VIRAL TRANSPORT MEDIA 24-30 HR TAT - Swab, Nasopharynx [[id number]] Collected: 11/16/20 2036    Lab Status: Final result Specimen: Swab from Nasopharynx Updated: 11/17/20 1248     SARS-CoV-2 SD Not Detected          Imaging Results (Last 24 Hours)     ** No results found for the last 24 hours. **               I have reviewed the medications:  Scheduled Meds:[START ON 11/18/2020] Pharmacy Consult, , Does not apply, Once  atorvastatin, 10 mg, Oral, Daily  cefTRIAXone, 1 g, Intravenous, Nightly  dabigatran etexilate, 150 mg, Oral, Q12H  sodium chloride, 10 mL, Intravenous, Q12H  vancomycin, 750 mg, Intravenous, Q24H      Continuous Infusions:Pharmacy to dose vancomycin,       PRN Meds:.acetaminophen **OR** acetaminophen **OR** acetaminophen  •  albuterol sulfate HFA  •  melatonin  •  Pharmacy to dose vancomycin  •  sodium chloride  •  [COMPLETED] Insert peripheral IV **AND** sodium chloride  •  sodium chloride    Assessment/Plan   Assessment & Plan     Active Hospital Problems    Diagnosis  POA   • Cellulitis of right leg [L03.115]  Yes   • Failure of outpatient treatment [Z78.9]  Yes   • A-fib (CMS/HCC) [I48.91]  Yes   • Hyperlipidemia [E78.5]  Yes       Resolved Hospital Problems   No resolved problems to display.        Brief Hospital Course to date:  Jesus Montiel Jr. is a 86 y.o. male with a PMHx of A.fib on Pradaxa, CAD and HLD who presented to [facility] ED c/o right lower extremity erythema w/ an open wound.      Cellulitis of RLE  Failure of outpatient treatment with keflex   -- started on vanc/rocephin (11/17) -- will continue   -- blood cultures x 2 pending   -- consult WOC   - if not continuing to improve; consider ID      Thrombocytopenia   -- monitor      Atrial fibrillation / Pacemaker   -- CHADS-VASc 4  -- continue Pradaxa   -- no longer taking Amiodarone   -- rate controlled      Hyperlipidemia   -- continue statin     DVT prophylaxis:  Pradaxa     Dispo: PT/OT with rehab recs     CODE STATUS:  Patient's daughter is POA    CODE STATUS:   Code Status and Medical Interventions:   Ordered at: 11/16/20 2001     Level Of Support Discussed With:    Health Care Surrogate     Code Status:    CPR     Medical Interventions (Level of Support Prior to Arrest):    Full       Adenike Higgins,   11/17/20

## 2020-11-17 NOTE — PLAN OF CARE
Problem: Adult Inpatient Plan of Care  Goal: Plan of Care Review  Recent Flowsheet Documentation  Taken 11/17/2020 0924 by Inna Sanchez, PT  Progress: (PT eval) no change  Plan of Care Reviewed With: patient  Outcome Summary: Patient presents with generalized weakness, decreased balance, and pain affecting functional mobility. He transferred supine>sit with sup, STS with Min A, and ambulated 15ft with HHA and Min A x2, demonstrating unsteadiness and narrow MARCELO. Recommend trialing RW next session. Skilled IP PT warranted. Recommend SNF at discharge to promote PLOF.

## 2020-11-18 LAB
ANION GAP SERPL CALCULATED.3IONS-SCNC: 10 MMOL/L (ref 5–15)
BASOPHILS # BLD AUTO: 0.03 10*3/MM3 (ref 0–0.2)
BASOPHILS NFR BLD AUTO: 0.4 % (ref 0–1.5)
BUN SERPL-MCNC: 16 MG/DL (ref 8–23)
BUN/CREAT SERPL: 22.9 (ref 7–25)
CALCIUM SPEC-SCNC: 8.4 MG/DL (ref 8.6–10.5)
CHLORIDE SERPL-SCNC: 108 MMOL/L (ref 98–107)
CO2 SERPL-SCNC: 22 MMOL/L (ref 22–29)
CREAT SERPL-MCNC: 0.7 MG/DL (ref 0.76–1.27)
DEPRECATED RDW RBC AUTO: 52.4 FL (ref 37–54)
EOSINOPHIL # BLD AUTO: 0.08 10*3/MM3 (ref 0–0.4)
EOSINOPHIL NFR BLD AUTO: 1.1 % (ref 0.3–6.2)
ERYTHROCYTE [DISTWIDTH] IN BLOOD BY AUTOMATED COUNT: 13.7 % (ref 12.3–15.4)
GFR SERPL CREATININE-BSD FRML MDRD: 107 ML/MIN/1.73
GLUCOSE SERPL-MCNC: 90 MG/DL (ref 65–99)
HCT VFR BLD AUTO: 45.7 % (ref 37.5–51)
HGB BLD-MCNC: 13.5 G/DL (ref 13–17.7)
IMM GRANULOCYTES # BLD AUTO: 0.04 10*3/MM3 (ref 0–0.05)
IMM GRANULOCYTES NFR BLD AUTO: 0.5 % (ref 0–0.5)
LYMPHOCYTES # BLD AUTO: 1.08 10*3/MM3 (ref 0.7–3.1)
LYMPHOCYTES NFR BLD AUTO: 14.8 % (ref 19.6–45.3)
MCH RBC QN AUTO: 30.3 PG (ref 26.6–33)
MCHC RBC AUTO-ENTMCNC: 29.5 G/DL (ref 31.5–35.7)
MCV RBC AUTO: 102.7 FL (ref 79–97)
MONOCYTES # BLD AUTO: 1.03 10*3/MM3 (ref 0.1–0.9)
MONOCYTES NFR BLD AUTO: 14.1 % (ref 5–12)
NEUTROPHILS NFR BLD AUTO: 5.02 10*3/MM3 (ref 1.7–7)
NEUTROPHILS NFR BLD AUTO: 69.1 % (ref 42.7–76)
NRBC BLD AUTO-RTO: 0 /100 WBC (ref 0–0.2)
PLATELET # BLD AUTO: 117 10*3/MM3 (ref 140–450)
PMV BLD AUTO: 11.6 FL (ref 6–12)
POTASSIUM SERPL-SCNC: 3.9 MMOL/L (ref 3.5–5.2)
RBC # BLD AUTO: 4.45 10*6/MM3 (ref 4.14–5.8)
SODIUM SERPL-SCNC: 140 MMOL/L (ref 136–145)
VANCOMYCIN TROUGH SERPL-MCNC: 5.2 MCG/ML (ref 5–20)
WBC # BLD AUTO: 7.28 10*3/MM3 (ref 3.4–10.8)

## 2020-11-18 PROCEDURE — 80202 ASSAY OF VANCOMYCIN: CPT

## 2020-11-18 PROCEDURE — 25010000002 CEFTRIAXONE PER 250 MG: Performed by: FAMILY MEDICINE

## 2020-11-18 PROCEDURE — 97530 THERAPEUTIC ACTIVITIES: CPT

## 2020-11-18 PROCEDURE — 85025 COMPLETE CBC W/AUTO DIFF WBC: CPT | Performed by: INTERNAL MEDICINE

## 2020-11-18 PROCEDURE — 97116 GAIT TRAINING THERAPY: CPT

## 2020-11-18 PROCEDURE — 99232 SBSQ HOSP IP/OBS MODERATE 35: CPT | Performed by: INTERNAL MEDICINE

## 2020-11-18 PROCEDURE — 80048 BASIC METABOLIC PNL TOTAL CA: CPT | Performed by: INTERNAL MEDICINE

## 2020-11-18 PROCEDURE — 25010000002 VANCOMYCIN 10 G RECONSTITUTED SOLUTION: Performed by: PHYSICIAN ASSISTANT

## 2020-11-18 RX ORDER — RIVASTIGMINE 9.5 MG/24H
1 PATCH, EXTENDED RELEASE TRANSDERMAL DAILY
Status: DISCONTINUED | OUTPATIENT
Start: 2020-11-18 | End: 2020-11-19 | Stop reason: HOSPADM

## 2020-11-18 RX ADMIN — SODIUM CHLORIDE, PRESERVATIVE FREE 10 ML: 5 INJECTION INTRAVENOUS at 22:12

## 2020-11-18 RX ADMIN — SODIUM CHLORIDE, PRESERVATIVE FREE 10 ML: 5 INJECTION INTRAVENOUS at 09:13

## 2020-11-18 RX ADMIN — RIVASTIGMINE 1 PATCH: 9.5 PATCH TRANSDERMAL at 17:17

## 2020-11-18 RX ADMIN — VANCOMYCIN HYDROCHLORIDE 1500 MG: 100 INJECTION, POWDER, LYOPHILIZED, FOR SOLUTION INTRAVENOUS at 23:53

## 2020-11-18 RX ADMIN — DABIGATRAN ETEXILATE MESYLATE 150 MG: 150 CAPSULE ORAL at 09:13

## 2020-11-18 RX ADMIN — ATORVASTATIN CALCIUM 10 MG: 10 TABLET, FILM COATED ORAL at 09:13

## 2020-11-18 RX ADMIN — DABIGATRAN ETEXILATE MESYLATE 150 MG: 150 CAPSULE ORAL at 22:12

## 2020-11-18 RX ADMIN — CEFTRIAXONE SODIUM 1 G: 1 INJECTION, POWDER, FOR SOLUTION INTRAMUSCULAR; INTRAVENOUS at 22:12

## 2020-11-18 NOTE — PROGRESS NOTES
[facility] Medicine Services  PROGRESS NOTE    Patient Name: Jesus Montiel Jr.  : 1934  MRN: [id number]    Date of Admission: 2020  Primary Care Physician: Provider, No Known    Subjective   Subjective     CC:  Cellulitis     HPI:  States he is doing ok today. His legs feel improved. Pain seems better. Called and updated patient's daughter.     Review of Systems  Gen- No fevers, chills  CV- No chest pain, palpitations  Resp- No cough, dyspnea  GI- No N/V/D, abd pain     Objective   Objective     Vital Signs:   Temp:  [97.5 °F (36.4 °C)-99.7 °F (37.6 °C)] 97.5 °F (36.4 °C)  Heart Rate:  [70] 70  Resp:  [18] 18  BP: ()/(44-65) 118/52        Physical Exam:  Constitutional: No acute distress, awake, alert  HENT: NCAT, mucous membranes moist  Respiratory: Clear to auscultation bilaterally, respiratory effort normal   Cardiovascular: RRR, no murmurs, rubs, or gallops, palpable pedal pulses bilaterally  Gastrointestinal: Positive bowel sounds, soft, nontender, nondistended  Musculoskeletal: trace bilateral edema; erythema improved; pain improved  Psychiatric: Appropriate affect, cooperative  Neurologic: Oriented x 3, strength symmetric in all extremities, Cranial Nerves grossly intact to confrontation, speech clear  Skin: No rashes    Results Reviewed:  Results from last 7 days   Lab Units 20  0811 20  0833 20  1644   WBC 10*3/mm3 7.28 6.24 6.90   HEMOGLOBIN g/dL 13.5 12.9* 13.1   HEMATOCRIT % 45.7 39.3 41.1   PLATELETS 10*3/mm3 117* 94* 114*     Results from last 7 days   Lab Units 20  0811 20  0833 20  1644   SODIUM mmol/L 140 140 141   POTASSIUM mmol/L 3.9 4.3 4.7   CHLORIDE mmol/L 108* 107 106   CO2 mmol/L 22.0 20.0* 29.0   BUN mg/dL 16 20 30*   CREATININE mg/dL 0.70* 0.64* 0.97   GLUCOSE mg/dL 90 82 103*   CALCIUM mg/dL 8.4* 8.3* 9.5   ALT (SGPT) U/L  --   --  14   AST (SGOT) U/L  --   --  20     Estimated Creatinine Clearance: 59.5  mL/min (A) (by C-G formula based on SCr of 0.7 mg/dL (L)).    Microbiology Results Abnormal     Procedure Component Value - Date/Time    Blood Culture - Blood, Arm, Left [[id number]] Collected: 11/16/20 1629    Lab Status: Preliminary result Specimen: Blood from Arm, Left Updated: 11/17/20 1700     Blood Culture No growth at 24 hours    Blood Culture - Blood, Arm, Left [[id number]] Collected: 11/16/20 1640    Lab Status: Preliminary result Specimen: Blood from Arm, Left Updated: 11/17/20 1700     Blood Culture No growth at 24 hours    COVID PRE-OP / PRE-PROCEDURE SCREENING ORDER (NO ISOLATION) - Swab, Nasopharynx [[id number]] Collected: 11/16/20 2036    Lab Status: Final result Specimen: Swab from Nasopharynx Updated: 11/17/20 1248    Narrative:      The following orders were created for panel order COVID PRE-OP / PRE-PROCEDURE SCREENING ORDER (NO ISOLATION) - Swab, Nasopharynx.  Procedure                               Abnormality         Status                     ---------                               -----------         ------                     COVID-19,[facility], NP ...[[id number]]                      Final result                 Please view results for these tests on the individual orders.    COVID-19,[facility], NP SWAB IN LEXAR VIRAL TRANSPORT MEDIA 24-30 HR TAT - Swab, Nasopharynx [[id number]] Collected: 11/16/20 2036    Lab Status: Final result Specimen: Swab from Nasopharynx Updated: 11/17/20 1248     SARS-CoV-2 SD Not Detected          Imaging Results (Last 24 Hours)     ** No results found for the last 24 hours. **               I have reviewed the medications:  Scheduled Meds:Pharmacy Consult, , Does not apply, Once  atorvastatin, 10 mg, Oral, Daily  cefTRIAXone, 1 g, Intravenous, Nightly  dabigatran etexilate, 150 mg, Oral, Q12H  rivastigmine, 1 patch, Transdermal, Daily  sodium chloride, 10 mL, Intravenous, Q12H  vancomycin, 1,000 mg, Intravenous, Q24H      Continuous Infusions:Pharmacy to dose  vancomycin,       PRN Meds:.acetaminophen **OR** acetaminophen **OR** acetaminophen  •  albuterol sulfate HFA  •  melatonin  •  Pharmacy to dose vancomycin  •  sodium chloride  •  [COMPLETED] Insert peripheral IV **AND** sodium chloride  •  sodium chloride    Assessment/Plan   Assessment & Plan     Active Hospital Problems    Diagnosis  POA   • Cellulitis of right leg [L03.115]  Yes   • Failure of outpatient treatment [Z78.9]  Yes   • A-fib (CMS/HCC) [I48.91]  Yes   • Hyperlipidemia [E78.5]  Yes      Resolved Hospital Problems   No resolved problems to display.        Brief Hospital Course to date:  Jesus Montiel Jr. is a 86 y.o. male with a PMHx of A.fib on Pradaxa, CAD and HLD who presented to [facility] ED c/o right lower extremity erythema w/ an open wound.      Cellulitis of RLE  Failure of outpatient treatment with keflex   -- started on vanc/rocephin (11/17) -- will continue - likely transition to augmentin/doxy on discharge   -- blood cultures x 2 NGTD  -- consult WOC   - legs much improved today     Thrombocytopenia   -- monitor      Atrial fibrillation / Pacemaker   -- CHADS-VASc 4  -- continue Pradaxa   -- no longer taking Amiodarone   -- rate controlled      Hyperlipidemia   -- continue statin     DVT prophylaxis:  Pradaxa      Dispo: PT/OT with rehab recs however he walked 800 ft today; reviewed with daughter and would like to go back to assisted living with home health        CODE STATUS:   Code Status and Medical Interventions:   Ordered at: 11/16/20 2001     Level Of Support Discussed With:    Health Care Surrogate     Code Status:    CPR     Medical Interventions (Level of Support Prior to Arrest):    Full       Adenike Higgins,   11/18/20

## 2020-11-18 NOTE — PLAN OF CARE
Goal Outcome Evaluation:  Plan of Care Reviewed With: patient  Progress: improving  Outcome Summary: pt remains a/o, room air, V. paced, vss. no c/o pain. small amount of drainage noted on LLE dressing. IV antibiotics given. will continue to monitor. Last BM 11/17, assist of 2 to BSC, using urinal.

## 2020-11-18 NOTE — PLAN OF CARE
Problem: Adult Inpatient Plan of Care  Goal: Plan of Care Review  Recent Flowsheet Documentation  Taken 11/18/2020 1033 by Inna Sanchez PT  Progress: improving  Plan of Care Reviewed With: patient  Outcome Summary: Patient demonstrated significant improvement in activity tolerance and balance. He transferred STS with Min A with posterior lean and ambulated 800ft with RW, initially requiring Min A but progressing to CGA with time. He required cues for safety with RW. 1 LOB requiring Min A to correct. Will continue to progress as tolerated.

## 2020-11-18 NOTE — PLAN OF CARE
Problem: Adult Inpatient Plan of Care  Goal: Plan of Care Review  Recent Flowsheet Documentation  Taken 11/18/2020 1035 by Laney Samuels, OT  Outcome Summary: Pt completed txfrs and bed mobility with min assist. Pt completed grooming tasks with supv and agreed to 2 sets of UE ex. Continue to recommend SNF at discharge.

## 2020-11-18 NOTE — THERAPY TREATMENT NOTE
Patient Name: Jesus Montiel Jr.  : 1934    MRN: [id number]                              Today's Date: 2020       Admit Date: 2020    Visit Dx:     ICD-10-CM ICD-9-CM   1. Cellulitis of right leg  L03.115 682.6     Patient Active Problem List   Diagnosis   • Cellulitis of right leg   • Failure of outpatient treatment   • Hypertension   • Hyperlipidemia   • A-fib (CMS/HCC)     Past Medical History:   Diagnosis Date   • Hyperlipidemia    • Hypertension    • Meniere disease    • Myocardial infarction (CMS/HCC)    • Tremor      Past Surgical History:   Procedure Laterality Date   • ABLATION OF DYSRHYTHMIC FOCUS     • CATARACT EXTRACTION, BILATERAL     • CORONARY ANGIOPLASTY WITH STENT PLACEMENT     • CORONARY ARTERY BYPASS BRING BACK FOR EXPLORATION     • HERNIA REPAIR     • INNER EAR SURGERY     • PACEMAKER IMPLANTATION       General Information     Row Name 20 1033          Physical Therapy Time and Intention    Document Type  therapy note (daily note)  -NS     Mode of Treatment  individual therapy;physical therapy  -NS     Row Name 20 1033          General Information    Patient Profile Reviewed  yes  -NS     Existing Precautions/Restrictions  fall;pacemaker [place]; RLE wound  -NS     Row Name 20 1033          Cognition    Orientation Status (Cognition)  oriented x 4  -NS     Row Name 20 1033          Safety Issues, Functional Mobility    Safety Issues Affecting Function (Mobility)  safety precaution awareness;safety precautions follow-through/compliance;sequencing abilities  -NS     Impairments Affecting Function (Mobility)  balance;endurance/activity tolerance;pain;strength  -NS       User Key  (r) = Recorded By, (t) = Taken By, (c) = Cosigned By    Initials Name Provider Type    Inna Rubin PT Physical Therapist        Mobility     Row Name 20 1033          Bed Mobility    Bed Mobility  sit-supine  -NS     Sit-Supine [place] (Bed Mobility)  minimum assist (75%  patient effort)  -NS     Assistive Device (Bed Mobility)  head of bed elevated  -NS     Comment (Bed Mobility)  Min A to assist with BLEs to bed.  -NS     Row Name 11/18/20 1033          Transfers    Comment (Transfers)  VCs for hand placement. Pt with mild posterior lean upon standing, cues for anterior weight shifting.  -NS     Row Name 11/18/20 1033          Sit-Stand Transfer    Sit-Stand [place] (Transfers)  minimum assist (75% patient effort);verbal cues  -NS     Assistive Device (Sit-Stand Transfers)  walker, front-wheeled  -NS     Row Name 11/18/20 1033          Gait/Stairs (Locomotion)    [place] Level (Gait)  minimum assist (75% patient effort)  -NS     Assistive Device (Gait)  walker, front-wheeled  -NS     Distance in Feet (Gait)  800  -NS     Deviations/Abnormal Patterns (Gait)  base of support, narrow;gris decreased;gait speed decreased;stride length decreased;antalgic  -NS     Bilateral Gait Deviations  heel strike decreased  -NS     Comment (Gait/Stairs)  Patient demonstrated significantly increased gait distance today and progressed to CGA with increased distance. He ambulated with RW, demonstrating narrow MARCELO and requiring cues for upright posture and staying within RW during turns. Pt had 1 LOB requiring Min A to correct. Pt demonstrated slightly antalgic gait pattern.  -NS       User Key  (r) = Recorded By, (t) = Taken By, (c) = Cosigned By    Initials Name Provider Type    Inna Rubin PT Physical Therapist        Obj/Interventions     Row Name 11/18/20 1033          Balance    Balance Assessment  sitting static balance;standing static balance;standing dynamic balance;sitting dynamic balance  -NS     Static Sitting Balance  WFL;unsupported;sitting, edge of bed  -NS     Dynamic Sitting Balance  WFL;unsupported;sitting in chair  -NS     Static Standing Balance  mild impairment;supported;standing  -NS     Dynamic Standing Balance  mild impairment;supported;standing  -NS      Comment, Balance  Posterior lean with standing.  -NS       User Key  (r) = Recorded By, (t) = Taken By, (c) = Cosigned By    Initials Name Provider Type    Inna Rubin PT Physical Therapist        Goals/Plan    No documentation.       Clinical Impression     Row Name 11/18/20 1033          Pain    Additional Documentation  Pain Scale: Numbers Pre/Post-Treatment (Group)  -NS     Row Name 11/18/20 1033          Pain Scale: Numbers Pre/Post-Treatment    Pretreatment Pain Rating  3/10  -NS     Posttreatment Pain Rating  2/10  -NS     Pain Location - Side  Right  -NS     Pain Location - Orientation  lower  -NS     Pain Location  extremity  -NS     Pain Intervention(s)  Repositioned;Ambulation/increased activity;Elevated  -NS     Row Name 11/18/20 1033          Plan of Care Review    Plan of Care Reviewed With  patient  -NS     Progress  improving  -NS     Outcome Summary  Patient demonstrated significant improvement in activity tolerance and balance. He transferred STS with Min A with posterior lean and ambulated 800ft with RW, initially requiring Min A but progressing to CGA with time. He required cues for safety with RW. 1 LOB requiring Min A to correct. Will continue to progress as tolerated.  -NS     [facility] Name 11/18/20 1033          Vital Signs    Pre Systolic BP Rehab  -- VSS- RN cleared for PT treatment  -NS     Pre Patient Position  Sitting  -NS     Intra Patient Position  Standing  -NS     Post Patient Position  Supine  -NS     Row Name 11/18/20 1033          Positioning and Restraints    Pre-Treatment Position  sitting in chair/recliner  -NS     Post Treatment Position  bed  -NS     In Bed  notified nsg;supine;call light within reach;encouraged to call for assist;exit alarm on;RLE elevated  -NS       User Key  (r) = Recorded By, (t) = Taken By, (c) = Cosigned By    Initials Name Provider Type    Inna Rubin PT Physical Therapist        Outcome Measures     Row Name 11/18/20 1033          How much help  from another person do you currently need...    Turning from your back to your side while in flat bed without using bedrails?  4  -NS     Moving from lying on back to sitting on the side of a flat bed without bedrails?  3  -NS     Moving to and from a bed to a chair (including a wheelchair)?  3  -NS     Standing up from a chair using your arms (e.g., wheelchair, bedside chair)?  3  -NS     Climbing 3-5 steps with a railing?  2  -NS     To walk in hospital room?  3  -NS     AM-PAC 6 Clicks Score (PT)  18  -NS       User Key  (r) = Recorded By, (t) = Taken By, (c) = Cosigned By    Initials Name Provider Type    Inna Rubin PT Physical Therapist        Physical Therapy Education                 Title: PT OT SLP Therapies (In Progress)     Topic: Physical Therapy (In Progress)     Point: Mobility training (Done)     Learning Progress Summary           Patient Acceptance, E, VU by NS at 11/18/2020 1148    Comment: Patient was educated about safety with using RW for gait.    Acceptance, E, VU,NR by NS at 11/17/2020 1145    Comment: Patient was educated about PT POC, sequencing mobility, and importance of OOB activity.                   Point: Home exercise program (Not Started)     Learner Progress:  Not documented in this visit.          Point: Body mechanics (Done)     Learning Progress Summary           Patient Acceptance, E, VU by NS at 11/18/2020 1148    Comment: Patient was educated about safety with using RW for gait.    Acceptance, E, VU,NR by NS at 11/17/2020 1145    Comment: Patient was educated about PT POC, sequencing mobility, and importance of OOB activity.                   Point: Precautions (Done)     Learning Progress Summary           Patient Acceptance, E, VU by NS at 11/18/2020 1148    Comment: Patient was educated about safety with using RW for gait.    Acceptance, E, VU,NR by NS at 11/17/2020 1145    Comment: Patient was educated about PT POC, sequencing mobility, and importance of OOB  activity.                               User Key     Initials Effective Dates Name Provider Type Discipline    NS 09/10/19 -  Inna Sanchez PT Physical Therapist PT              PT Recommendation and Plan  Planned Therapy Interventions (PT): balance training, bed mobility training, gait training, home exercise program, neuromuscular re-education, transfer training, patient/family education, strengthening  Plan of Care Reviewed With: patient  Progress: improving  Outcome Summary: Patient demonstrated significant improvement in activity tolerance and balance. He transferred STS with Min A with posterior lean and ambulated 800ft with RW, initially requiring Min A but progressing to CGA with time. He required cues for safety with RW. 1 LOB requiring Min A to correct. Will continue to progress as tolerated.     Time Calculation:   PT Charges     Row Name 11/18/20 1033             Time Calculation    Start Time  1033  -NS      PT Received On  11/18/20  -NS      PT Goal Re-Cert Due Date  11/27/20  -NS         Timed Charges    83922 - Gait Training Minutes   25  -NS        User Key  (r) = Recorded By, (t) = Taken By, (c) = Cosigned By    Initials Name Provider Type    NS Inna Sanchez PT Physical Therapist        Therapy Charges for Today     Code Description Service Date Service Provider Modifiers Qty    06741706239 HC PT EVAL LOW COMPLEXITY 4 11/17/2020 Inna Sanchez, PT GP 1    83603645351 HC GAIT TRAINING EA 15 MIN 11/18/2020 Inna Sanchez, PT GP 2          PT G-Codes  Outcome Measure Options: AM-PAC 6 Clicks Daily Activity (OT)  AM-PAC 6 Clicks Score (PT): 18  AM-PAC 6 Clicks Score (OT): 16    Inna Sanchez PT  11/18/2020

## 2020-11-18 NOTE — PROGRESS NOTES
Continued Stay Note  [facility]     Patient Name: Jesus Montiel Jr.  MRN: [id number]  Today's Date: 11/18/2020    Admit Date: 11/16/2020    Discharge Plan     Row Name 11/18/20 1541       Plan    Plan  Back to [facility] and resumption of HH.    Patient/Family in Agreement with Plan  yes    Plan Comments  Spoke to pt at  and he is agreeable for CM to speak to dtr. Pt walked 800 ft with PT. Per pt's daughter Mary Jo pt will return to [facility] with resumption of HH. Spoke to Mami at [facility] and pt is current for SN for cellulitis of the left lower limb. CM will resume at d/c.  Luli [phone]    Final Discharge Disposition Code  06 - home with home health care        Discharge Codes    No documentation.             Luli Leggett RN

## 2020-11-18 NOTE — THERAPY TREATMENT NOTE
Patient Name: Jesus Montiel Jr.  : 1934    MRN: [id number]                              Today's Date: 2020       Admit Date: 2020    Visit Dx:     ICD-10-CM ICD-9-CM   1. Cellulitis of right leg  L03.115 682.6     Patient Active Problem List   Diagnosis   • Cellulitis of right leg   • Failure of outpatient treatment   • Hypertension   • Hyperlipidemia   • A-fib (CMS/HCC)     Past Medical History:   Diagnosis Date   • Hyperlipidemia    • Hypertension    • Meniere disease    • Myocardial infarction (CMS/HCC)    • Tremor      Past Surgical History:   Procedure Laterality Date   • ABLATION OF DYSRHYTHMIC FOCUS     • CATARACT EXTRACTION, BILATERAL     • CORONARY ANGIOPLASTY WITH STENT PLACEMENT     • CORONARY ARTERY BYPASS BRING BACK FOR EXPLORATION     • HERNIA REPAIR     • INNER EAR SURGERY     • PACEMAKER IMPLANTATION       General Information     Row Name 20 1014          OT Time and Intention    Document Type  therapy note (daily note)  -AN     Mode of Treatment  occupational therapy  -AN     Row Name 20 1014          General Information    Existing Precautions/Restrictions  fall;pacemaker [place], R LE wound with pain  -AN       User Key  (r) = Recorded By, (t) = Taken By, (c) = Cosigned By    Initials Name Provider Type    AN Laney Samuels OT Occupational Therapist        Mobility/ADL's     Row Name 20 1015          Bed Mobility    Supine-Sit [place] (Bed Mobility)  minimum assist (75% patient effort)  -AN     Assistive Device (Bed Mobility)  bed rails;head of bed elevated  -AN     Row Name 20 1015          Transfers    Bed-Chair [place] (Transfers)  minimum assist (75% patient effort);verbal cues  -AN     Sit-Stand [place] (Transfers)  minimum assist (75% patient effort);verbal cues  -AN       User Key  (r) = Recorded By, (t) = Taken By, (c) = Cosigned By    Initials Name Provider Type    AN Laney Samuels OT Occupational Therapist        Obj/Interventions      Row Name 11/18/20 1034          Balance    Static Sitting Balance  WFL  -AN     Dynamic Sitting Balance  WFL  -AN     Static Standing Balance  mild impairment  -AN     Dynamic Standing Balance  mild impairment  -AN     Row Name 11/18/20 1034          Therapeutic Exercise    Therapeutic Exercise  -- BUE ex 2 sets x 10 shoulder forward flex, abd/add, elbow f/e, wrist and hand  -AN       User Key  (r) = Recorded By, (t) = Taken By, (c) = Cosigned By    Initials Name Provider Type    Laney Reaves OT Occupational Therapist        Goals/Plan    No documentation.       Clinical Impression     Row Name 11/18/20 1035          Pain Scale: Numbers Pre/Post-Treatment    Pretreatment Pain Rating  3/10  -AN     Posttreatment Pain Rating  3/10  -AN     Pain Location - Side  Right  -AN     Pain Location - Orientation  lower  -AN     Pain Location  extremity  -AN     Pain Intervention(s)  Repositioned  -AN     Row Name 11/18/20 1035          Plan of Care Review    Outcome Summary  Pt completed txfrs and bed mobility with min assist. Pt completed grooming tasks with supv and agreed to 2 sets of UE ex. Continue to recommend SNF at discharge.  -AN     Row Name 11/18/20 1035          Therapy Assessment/Plan (OT)    Rehab Potential (OT)  good, to achieve stated therapy goals  -AN     Row Name 11/18/20 1035          Therapy Plan Review/Discharge Plan (OT)    Anticipated Discharge Disposition (OT)  skilled nursing facility  -AN     Row Name 11/18/20 1035          Vital Signs    Pre Systolic BP Rehab  118  -AN     Pre Treatment Diastolic BP  65  -AN     Pretreatment Heart Rate (beats/min)  70  -AN     Posttreatment Heart Rate (beats/min)  65  -AN     Post SpO2 (%)  97  -AN     O2 Delivery Post Treatment  room air  -AN       User Key  (r) = Recorded By, (t) = Taken By, (c) = Cosigned By    Initials Name Provider Type    Laney Reaves OT Occupational Therapist        Outcome Measures     Row Name 11/18/20 1037          How much  help from another is currently needed...    Putting on and taking off regular lower body clothing?  2  -AN     Bathing (including washing, rinsing, and drying)  2  -AN     Toileting (which includes using toilet bed pan or urinal)  2  -AN     Putting on and taking off regular upper body clothing  3  -AN     Taking care of personal grooming (such as brushing teeth)  3  -AN     Eating meals  4  -AN     AM-PAC 6 Clicks Score (OT)  16  -AN       User Key  (r) = Recorded By, (t) = Taken By, (c) = Cosigned By    Initials Name Provider Type    AN Laney Samuels OT Occupational Therapist        Occupational Therapy Education                 Title: PT OT SLP Therapies (In Progress)     Topic: Occupational Therapy (In Progress)     Point: ADL training (Done)     Description:   Instruct learner(s) on proper safety adaptation and remediation techniques during self care or transfers.   Instruct in proper use of assistive devices.              Learning Progress Summary           Patient Acceptance, E,D, VU,NR by JOSTIN at 11/18/2020 0947    Comment: ADL retraining, benefits of increased therapeutic ex/activities.    Acceptance, E,D, NR by MIN at 11/17/2020 0928                   Point: Precautions (In Progress)     Description:   Instruct learner(s) on prescribed precautions during self-care and functional transfers.              Learning Progress Summary           Patient Acceptance, E,D, NR by MIN at 11/17/2020 0928                   Point: Body mechanics (In Progress)     Description:   Instruct learner(s) on proper positioning and spine alignment during self-care, functional mobility activities and/or exercises.              Learning Progress Summary           Patient Acceptance, E,D, NR by MIN at 11/17/2020 0928                               User Key     Initials Effective Dates Name Provider Type Discipline    JOSTIN 06/22/15 -  Laney Samuels OT Occupational Therapist OT    MIN 01/29/20 -  Lisa Haddad OT Occupational Therapist OT               OT Recommendation and Plan     Plan of Care Review  Outcome Summary: Pt completed txfrs and bed mobility with min assist. Pt completed grooming tasks with supv and agreed to 2 sets of UE ex. Continue to recommend SNF at discharge.     Time Calculation:   Time Calculation- OT     Row Name 11/18/20 1039             Time Calculation- OT    OT Start Time  0947  -AN      Total Timed Code Minutes- OT  24 minute(s)  -AN      OT Received On  11/18/20  -AN      OT Goal Re-Cert Due Date  11/27/20  -AN        User Key  (r) = Recorded By, (t) = Taken By, (c) = Cosigned By    Initials Name Provider Type    Laney Reaves OT Occupational Therapist        Therapy Charges for Today     Code Description Service Date Service Provider Modifiers Qty    18221345377  OT THERAPEUTIC ACT EA 15 MIN 11/18/2020 Laney Samuels OT GO 2               Laney Samuels OT  11/18/2020

## 2020-11-18 NOTE — NURSING NOTE
WOC nurse contacted by Amanda HILL to reassess open blister RLE.     Partially open blister RLE 11 X 11 X 0.1 cm; moderate amount serosanguinous fluid drained from blister. Wound cleaned with NS, Xeroform applied, then non-adhesive foam dressing; wrapped in Kerlix. See order to change dressing daily.    Pt has 2+ edema BLE. PT Wound Care consulted to assess BLE for compression therapy and possible debridement of blister area RLE.     Please float heels on pillows.    WOC nurse will f/u. Please contact WOC nurse as needed for concerns.

## 2020-11-19 VITALS
RESPIRATION RATE: 18 BRPM | HEIGHT: 69 IN | WEIGHT: 140 LBS | DIASTOLIC BLOOD PRESSURE: 59 MMHG | HEART RATE: 70 BPM | SYSTOLIC BLOOD PRESSURE: 120 MMHG | TEMPERATURE: 98.6 F | BODY MASS INDEX: 20.73 KG/M2 | OXYGEN SATURATION: 96 %

## 2020-11-19 PROCEDURE — 29581 APPL MULTLAYER CMPRN SYS LEG: CPT

## 2020-11-19 PROCEDURE — 97116 GAIT TRAINING THERAPY: CPT

## 2020-11-19 PROCEDURE — 97164 PT RE-EVAL EST PLAN CARE: CPT

## 2020-11-19 PROCEDURE — 97597 DBRDMT OPN WND 1ST 20 CM/<: CPT

## 2020-11-19 PROCEDURE — 99239 HOSP IP/OBS DSCHRG MGMT >30: CPT | Performed by: INTERNAL MEDICINE

## 2020-11-19 RX ORDER — DOXYCYCLINE 100 MG/1
100 CAPSULE ORAL 2 TIMES DAILY
Qty: 12 CAPSULE | Refills: 0 | Status: SHIPPED | OUTPATIENT
Start: 2020-11-19 | End: 2020-11-25

## 2020-11-19 RX ORDER — SACCHAROMYCES BOULARDII 250 MG
250 CAPSULE ORAL 2 TIMES DAILY
Qty: 60 CAPSULE | Refills: 0 | Status: SHIPPED | OUTPATIENT
Start: 2020-11-19 | End: 2022-11-02

## 2020-11-19 RX ORDER — ALBUTEROL SULFATE 90 UG/1
2 AEROSOL, METERED RESPIRATORY (INHALATION) EVERY 6 HOURS PRN
Start: 2020-11-19 | End: 2022-11-02

## 2020-11-19 RX ORDER — AMOXICILLIN AND CLAVULANATE POTASSIUM 875; 125 MG/1; MG/1
1 TABLET, FILM COATED ORAL 2 TIMES DAILY
Qty: 12 TABLET | Refills: 0 | Status: SHIPPED | OUTPATIENT
Start: 2020-11-19 | End: 2020-11-25

## 2020-11-19 RX ADMIN — ATORVASTATIN CALCIUM 10 MG: 10 TABLET, FILM COATED ORAL at 08:43

## 2020-11-19 RX ADMIN — DABIGATRAN ETEXILATE MESYLATE 150 MG: 150 CAPSULE ORAL at 08:43

## 2020-11-19 RX ADMIN — RIVASTIGMINE 1 PATCH: 9.5 PATCH TRANSDERMAL at 08:43

## 2020-11-19 RX ADMIN — SODIUM CHLORIDE, PRESERVATIVE FREE 10 ML: 5 INJECTION INTRAVENOUS at 08:44

## 2020-11-19 RX ADMIN — ACETAMINOPHEN 650 MG: 325 TABLET, FILM COATED ORAL at 02:00

## 2020-11-19 NOTE — THERAPY TREATMENT NOTE
Patient Name: Jesus Montiel Jr.  : 1934    MRN: [id number]                              Today's Date: 2020       Admit Date: 2020    Visit Dx:     ICD-10-CM ICD-9-CM   1. Cellulitis of right leg  L03.115 682.6   2. Bronchitis  J40 490     Patient Active Problem List   Diagnosis   • Cellulitis of right leg   • Failure of outpatient treatment   • Hypertension   • Hyperlipidemia   • A-fib (CMS/HCC)     Past Medical History:   Diagnosis Date   • Hyperlipidemia    • Hypertension    • Meniere disease    • Myocardial infarction (CMS/HCC)    • Tremor      Past Surgical History:   Procedure Laterality Date   • ABLATION OF DYSRHYTHMIC FOCUS     • CATARACT EXTRACTION, BILATERAL     • CORONARY ANGIOPLASTY WITH STENT PLACEMENT     • CORONARY ARTERY BYPASS BRING BACK FOR EXPLORATION     • HERNIA REPAIR     • INNER EAR SURGERY     • PACEMAKER IMPLANTATION       General Information     Row Name 20 1544          Physical Therapy Time and Intention    Document Type  therapy note (daily note)  -NS     Mode of Treatment  individual therapy;physical therapy  -NS     Row Name 20 1544          General Information    Patient Profile Reviewed  yes  -NS     Existing Precautions/Restrictions  fall;pacemaker [place]; RLE wound  -NS     Row Name 20 1544          Cognition    Orientation Status (Cognition)  oriented x 4  -NS     Row Name 20 1544          Safety Issues, Functional Mobility    Safety Issues Affecting Function (Mobility)  safety precaution awareness;safety precautions follow-through/compliance;sequencing abilities  -NS     Impairments Affecting Function (Mobility)  balance;endurance/activity tolerance;pain;strength  -NS       User Key  (r) = Recorded By, (t) = Taken By, (c) = Cosigned By    Initials Name Provider Type    Inna Rubin PT Physical Therapist        Mobility     Row Name 20 1544          Bed Mobility    Bed Mobility  sit-supine  -NS     Scooting/Bridging [place]  (Bed Mobility)  supervision;verbal cues  -NS     Sit-Supine [place] (Bed Mobility)  contact guard  -NS     Assistive Device (Bed Mobility)  bed rails  -NS     Comment (Bed Mobility)  VCs for sequencing.  -NS     Row Name 11/19/20 1544          Transfers    Comment (Transfers)  VCs for hand placement. Very mild posterior lean upon standing.  -NS     Row Name 11/19/20 1544          Sit-Stand Transfer    Sit-Stand [place] (Transfers)  minimum assist (75% patient effort);verbal cues  -NS     Assistive Device (Sit-Stand Transfers)  walker, front-wheeled  -NS     Row Name 11/19/20 1544          Gait/Stairs (Locomotion)    [place] Level (Gait)  contact guard;verbal cues  -NS     Assistive Device (Gait)  walker, front-wheeled  -NS     Distance in Feet (Gait)  800  -NS     Deviations/Abnormal Patterns (Gait)  base of support, narrow;gris decreased;gait speed decreased;stride length decreased  -NS     Bilateral Gait Deviations  heel strike decreased;forward flexed posture  -NS     [place] Level (Stairs)  contact guard;verbal cues  -NS     Handrail Location (Stairs)  right side (ascending)  -NS     Number of Steps (Stairs)  10  -NS     Ascending Technique (Stairs)  step-to-step  -NS     Descending Technique (Stairs)  step-to-step  -NS     Comment (Gait/Stairs)  Patient ambulated at slow gait speed that increased with increased distance. He required cues for upright posture and safety during turns. Pt climbed a flight of stairs, with cues for sequencing proper techniques, use of hand rails, using step-to pattern for improved safety. Pt's daughter present for education regarding assisting pt with stairs and using gait belt.  -NS       User Key  (r) = Recorded By, (t) = Taken By, (c) = Cosigned By    Initials Name Provider Type    Inna Rubin PT Physical Therapist        Obj/Interventions     Row Name 11/19/20 2101          Balance    Balance Assessment  sitting static balance;sitting dynamic  balance;standing static balance;standing dynamic balance  -NS     Static Sitting Balance  WFL;unsupported;sitting in chair  -NS     Dynamic Sitting Balance  WFL;unsupported;sitting in chair  -NS     Static Standing Balance  mild impairment;supported;standing  -NS     Dynamic Standing Balance  mild impairment;supported;standing  -NS       User Key  (r) = Recorded By, (t) = Taken By, (c) = Cosigned By    Initials Name Provider Type    Inna Rubin, JAEL Physical Therapist        Goals/Plan    No documentation.       Clinical Impression     Row Name 11/19/20 1544          Pain    Additional Documentation  Pain Scale: Numbers Pre/Post-Treatment (Group)  -NS     Row Name 11/19/20 1543          Pain Scale: Numbers Pre/Post-Treatment    Pretreatment Pain Rating  0/10 - no pain  -NS     Posttreatment Pain Rating  0/10 - no pain  -NS     Pre/Posttreatment Pain Comment  Pt notes improvement in RLE tightness feeling with mobility.  -NS     Pain Intervention(s)  Ambulation/increased activity  -NS     Row Name 11/19/20 1544          Plan of Care Review    Plan of Care Reviewed With  patient  -NS     Progress  improving  -NS     Outcome Summary  Patient continues to progress towards goals. He transferred STS with Min A and ambulated 800ft with RW and CGA, requiring cues for safe RW management. He climbed a flight of stairs with CGA and cues for utilizing step-to pattern and hand rails for safety. Daughter present for education regarding safety with gait and stairs. Anticipating d/c home today with daughter.  -NS     Row Name 11/19/20 1544          Vital Signs    Pre Systolic BP Rehab  -- VSS- RN cleared for PT treatment  -NS     Pre Patient Position  Sitting  -NS     Intra Patient Position  Standing  -NS     Post Patient Position  Supine  -NS     Row Name 11/19/20 154          Positioning and Restraints    Pre-Treatment Position  sitting in chair/recliner  -NS     Post Treatment Position  bed  -NS     In Bed  notified  nsg;supine;call light within reach;encouraged to call for assist;with family/caregiver;with nsg RN deferred bed alarm  -NS       User Key  (r) = Recorded By, (t) = Taken By, (c) = Cosigned By    Initials Name Provider Type    Inna Rubin PT Physical Therapist        Outcome Measures     Row Name 11/19/20 1544          How much help from another person do you currently need...    Turning from your back to your side while in flat bed without using bedrails?  4  -NS     Moving from lying on back to sitting on the side of a flat bed without bedrails?  3  -NS     Moving to and from a bed to a chair (including a wheelchair)?  3  -NS     Standing up from a chair using your arms (e.g., wheelchair, bedside chair)?  3  -NS     Climbing 3-5 steps with a railing?  3  -NS     To walk in hospital room?  3  -NS     AM-PAC 6 Clicks Score (PT)  19  -NS     Row Name 11/19/20 1540          Functional Assessment    Outcome Measure Options  AM-PAC 6 Clicks Basic Mobility (PT)  -NS       User Key  (r) = Recorded By, (t) = Taken By, (c) = Cosigned By    Initials Name Provider Type    Inna Rubin PT Physical Therapist        Physical Therapy Education                 Title: PT OT SLP Therapies (In Progress)     Topic: Physical Therapy (In Progress)     Point: Mobility training (Done)     Learning Progress Summary           Patient Acceptance, E, VU by NS at 11/19/2020 1622    Comment: Patient was educated about safety with ambulation, sequencing stairs, and benefits of daily activity.    Acceptance, E, VU by NS at 11/18/2020 1148    Comment: Patient was educated about safety with using RW for gait.    Acceptance, E, VU,NR by NS at 11/17/2020 1145    Comment: Patient was educated about PT POC, sequencing mobility, and importance of OOB activity.   Family Acceptance, E, VU by NS at 11/19/2020 1622    Comment: Patient was educated about safety with ambulation, sequencing stairs, and benefits of daily activity.                    Point: Home exercise program (Not Started)     Learner Progress:  Not documented in this visit.          Point: Body mechanics (Done)     Learning Progress Summary           Patient Acceptance, E, VU by NS at 11/19/2020 1622    Comment: Patient was educated about safety with ambulation, sequencing stairs, and benefits of daily activity.    Acceptance, E, VU by NS at 11/18/2020 1148    Comment: Patient was educated about safety with using RW for gait.    Acceptance, E, VU,NR by NS at 11/17/2020 1145    Comment: Patient was educated about PT POC, sequencing mobility, and importance of OOB activity.   Family Acceptance, E, VU by NS at 11/19/2020 1622    Comment: Patient was educated about safety with ambulation, sequencing stairs, and benefits of daily activity.                   Point: Precautions (Done)     Learning Progress Summary           Patient Acceptance, E, VU by NS at 11/19/2020 1622    Comment: Patient was educated about safety with ambulation, sequencing stairs, and benefits of daily activity.    Acceptance, E, VU by NS at 11/18/2020 1148    Comment: Patient was educated about safety with using RW for gait.    Acceptance, E, VU,NR by NS at 11/17/2020 1145    Comment: Patient was educated about PT POC, sequencing mobility, and importance of OOB activity.   Family Acceptance, E, VU by NS at 11/19/2020 1622    Comment: Patient was educated about safety with ambulation, sequencing stairs, and benefits of daily activity.                               User Key     Initials Effective Dates Name Provider Type Discipline    NS 09/10/19 -  Inna Sanchez PT Physical Therapist PT              PT Recommendation and Plan  Planned Therapy Interventions (PT): balance training, bed mobility training, gait training, home exercise program, neuromuscular re-education, transfer training, patient/family education, strengthening  Plan of Care Reviewed With: patient  Progress: improving  Outcome Summary: Patient continues to  progress towards goals. He transferred STS with Min A and ambulated 800ft with RW and CGA, requiring cues for safe RW management. He climbed a flight of stairs with CGA and cues for utilizing step-to pattern and hand rails for safety. Daughter present for education regarding safety with gait and stairs. Anticipating d/c home today with daughter.     Time Calculation:   PT Charges     Row Name 11/19/20 1544 11/19/20 0945          Time Calculation    Start Time  1544  -NS  0945  -MP     PT Received On  11/19/20  -NS  --     PT Goal Re-Cert Due Date  11/27/20  -NS  11/27/20  -MP        Timed Charges    38766 - Gait Training Minutes   29  -NS  --       User Key  (r) = Recorded By, (t) = Taken By, (c) = Cosigned By    Initials Name Provider Type    Inna Rubin, PT Physical Therapist    MP Kike Siddiqui, PT Physical Therapist        Therapy Charges for Today     Code Description Service Date Service Provider Modifiers Qty    64087860431 HC GAIT TRAINING EA 15 MIN 11/18/2020 Inna Sanchez, PT GP 2    74646677069 HC GAIT TRAINING EA 15 MIN 11/19/2020 Inna Sanchez, PT GP 2          PT G-Codes  Outcome Measure Options: AM-PAC 6 Clicks Basic Mobility (PT)  AM-PAC 6 Clicks Score (PT): 19  AM-PAC 6 Clicks Score (OT): 16    Inna Sanchez PT  11/19/2020

## 2020-11-19 NOTE — THERAPY RE-EVALUATION
Acute Care - Wound/Debridement Re-Evaluation  [facility]     Patient Name: Jesus Montiel Jr.  : 1934  MRN: [id number]  Today's Date: 2020                   Admit Date: 2020    Visit Dx:    ICD-10-CM ICD-9-CM   1. Cellulitis of right leg  L03.115 682.6       Patient Active Problem List   Diagnosis   • Cellulitis of right leg   • Failure of outpatient treatment   • Hypertension   • Hyperlipidemia   • A-fib (CMS/HCC)        Past Medical History:   Diagnosis Date   • Hyperlipidemia    • Hypertension    • Meniere disease    • Myocardial infarction (CMS/HCC)    • Tremor         Past Surgical History:   Procedure Laterality Date   • ABLATION OF DYSRHYTHMIC FOCUS     • CATARACT EXTRACTION, BILATERAL     • CORONARY ANGIOPLASTY WITH STENT PLACEMENT     • CORONARY ARTERY BYPASS BRING BACK FOR EXPLORATION     • HERNIA REPAIR     • INNER EAR SURGERY     • PACEMAKER IMPLANTATION             Wound 20 Right distal leg Blisters (Active)   Wound Image    20   Dressing Appearance intact;no drainage 20   Closure DENNIS 20 0409   Base moist;pink;red;other (see comments) 20   Periwound blistered;moist;non-blanchable 20   Periwound Temperature warm 20   Periwound Skin Turgor soft 20   Edges irregular;open 20   Wound Length (cm) 11 cm 20   Wound Width (cm) 11 cm 20   Wound Depth (cm) 0.1 cm 20   Drainage Characteristics/Odor serosanguineous 20   Drainage Amount scant 20   Care, Wound cleansed with;wound cleanser;debrided 20   Dressing Care dressing applied;petroleum-based;gauze;multi-layer wrap 20   Periwound Care cleansed with pH balanced cleanser;dry periwound area maintained 20     Lymphedema     Row Name 20             Subjective Pain    Able to rate subjective pain?  yes  -MP         Lymphedema Edema Assessment    Ptting Edema  Category  By severity  -MP      Pitting Edema  Mild  -MP         Lymphedema Pulses/Capillary Refill    Lymphedema Pulses/Capillary Refill  lower extremity pulses;capillary refill  -MP      Dorsalis Pedis Pulse  right:;left:;+1 diminished  -MP      Posterior Tibialis Pulse  right:;left:;+1 diminished  -MP      Capillary Refill  lower extremity capillary refill  -MP      Lower Extremity Capillary Refill  right:;left:;less than 3 seconds  -MP         Lymphedema Measurements    Measurement Type(s)  Quick Girth  -MP      Quick Girth Areas  Lower extremities  -MP         LLE Quick Girth (cm)    Met-heads  22.5 cm  -MP      Smallest ankle  22.6 cm  -MP      Largest calf  31.9 cm  -MP         RLE Quick Girth (cm)    Met-heads  22.9 cm  -MP      Smallest ankle  22.5 cm  -MP      Largest calf  32.5 cm  -MP      RLE Quick Girth Total  77.9  -MP         Compression/Skin Care    Compression/Skin Care  skin care;wrapping location;bandaging  -MP      Skin Care  washed/dried  -MP      Wrapping Location  lower extremity  -MP      Wrapping Location LE  bilateral:;foot to knee  -MP      Wrapping Comments  Xeroform secured with cast padding to R anterior LE. Size 4/5 compressogrip doubled and overlapping for gradient compression to B LE.  -MP        User Key  (r) = Recorded By, (t) = Taken By, (c) = Cosigned By    Initials Name Provider Type    Kike Reynolds, PT Physical Therapist          WOUND DEBRIDEMENT  Total area of Debridement: 6 cm2  Debridement Site 1  Location- Site 1: R shin  Selective Debridement- Site 1: Wound Surface <20cmsq  Instruments- Site 1: tweezers  Excised Tissue Description- Site 1: moderate, other (comment)(nonviable blister debris)  Bleeding- Site 1: none                  PT Assessment (last 12 hours)      PT Evaluation and Treatment     Row Name 11/19/20 3169          Physical Therapy Time and Intention    Subjective Information  complains of;fatigue;pain  -MP     Document Type  re-evaluation;wound care   -MP     Mode of Treatment  individual therapy;physical therapy  -MP     Row Name 11/19/20 0945          General Information    Patient Profile Reviewed  yes  -MP     Risks Reviewed  patient:;increased discomfort  -MP     Benefits Reviewed  patient:;decrease pain;improve skin integrity;increase knowledge  -MP     Barriers to Rehab  medically complex  -MP     Row Name 11/19/20 0945          Cognition    Affect/Mental Status (Cognitive)  WFL  -MP     Row Name 11/19/20 0945          Pain    Additional Documentation  Pain Scale: FACES Pre/Post-Treatment (Group)  -MP     Row Name 11/19/20 0945          Pain Scale: FACES Pre/Post-Treatment    Pain: FACES Scale, Pretreatment  0-->no hurt  -MP     Posttreatment Pain Rating  2-->hurts little bit  -MP     Pain Location - Side  Right  -MP     Pain Location - Orientation  lower  -MP     Pain Location  extremity  -MP     Pre/Posttreatment Pain Comment  ~4/10 with debridement  -MP     Row Name 11/19/20 0945          Wound 11/16/20 2125 Right distal leg Blisters    Wound - Properties Group Placement Date: 11/16/20  -LG Placement Time: 2125  -LG Present on Hospital Admission: Y  -LG Side: Right  -LG Orientation: distal  -LG Location: leg  -LG Primary Wound Type: Blisters  -LG    Wound Image  Images linked: 2  -MP     Dressing Appearance  intact;no drainage  -MP     Base  moist;pink;red;other (see comments) nonviable blister debris  -MP     Periwound  blistered;moist;non-blanchable  -MP     Periwound Temperature  warm  -MP     Periwound Skin Turgor  soft  -MP     Edges  irregular;open  -MP     Wound Length (cm)  11 cm  -MP     Wound Width (cm)  11 cm  -MP     Wound Depth (cm)  0.1 cm  -MP     Drainage Characteristics/Odor  serosanguineous  -MP     Drainage Amount  scant  -MP     Care, Wound  cleansed with;wound cleanser;debrided  -MP     Dressing Care  dressing applied;petroleum-based;gauze;multi-layer wrap Xeroform, cast padding, size 4/5 compressogrip  -MP     Periwound Care   cleansed with pH balanced cleanser;dry periwound area maintained  -MP     Retired Wound - Properties Group Date first assessed: 11/16/20  -LG Time first assessed: 2125  -LG Present on Hospital Admission: Y  -LG Side: Right  -LG Location: leg  -LG Primary Wound Type: Blisters  -LG    Row Name 11/19/20 0945          Coping    Observed Emotional State  calm;cooperative  -MP     Verbalized Emotional State  acceptance  -MP     Trust Relationship/Rapport  care explained;choices provided  -     Row Name 11/19/20 0945          Plan of Care Review    Plan of Care Reviewed With  patient  -MP     Progress  improving  -MP     Outcome Summary  PT wound care re-eval completed. Patient presents with large unroofed blister on R anterior shin with mild B LE edema. PT able to debride moderate amount of nonviable blister debris and dressed wound with Xeroform secured with cast padding. PT applied size 4/5 compressogrip to B LE's to promote increased venous return. Patient would continue to benefit from skilled PT wound care to promote increased wound healing potential.  -     Row Name 11/19/20 0945          Physical Therapy Goals    Wound Care Goal Selection (PT)  wound care, PT goal 1;wound care, PT goal 2  -     Row Name 11/19/20 0945          Wound Care Goal 1 (PT)    Wound Care Goal 1 (PT)  Patient will present with a 50% reduction in wound area as evidence of wound healing.  -     Time Frame (Wound Care Goal 1, PT)  short term goal (STG)  -     Row Name 11/19/20 0945          Wound Care Goal 2 (PT)    Wound Care Goal 2 (PT)  Patient will present with a 85% reduction in wound area as evidence of wound healing.  -     Time Frame (Wound Care Goal 2, PT)  long term goal (LTG)  -     Row Name 11/19/20 0945          Positioning and Restraints    Pre-Treatment Position  in bed  -MP     Post Treatment Position  bed  -MP     In Bed  notified nsg;supine;call light within reach;encouraged to call for assist;exit alarm on;legs  elevated  -MP     Row Name 11/19/20 0945          Therapy Assessment/Plan (PT)    PT Diagnosis (PT)  Open wound to R LE with B LE edema  -MP     Criteria for Skilled Interventions Met (PT)  yes;meets criteria;skilled treatment is necessary  -MP     Row Name 11/19/20 0945          Therapy Plan Review/Discharge Plan (PT)    Therapy Plan Review (PT)  evaluation/treatment results reviewed;care plan/treatment goals reviewed;risks/benefits reviewed  -MP       User Key  (r) = Recorded By, (t) = Taken By, (c) = Cosigned By    Initials Name Provider Type    Rhonda Guzman, RN Registered Nurse    Kike Reynolds, PT Physical Therapist        Physical Therapy Education                 Title: PT OT SLP Therapies (In Progress)     Topic: Physical Therapy (In Progress)     Point: Mobility training (Done)     Learning Progress Summary           Patient Acceptance, E, VU by NS at 11/18/2020 1148    Comment: Patient was educated about safety with using RW for gait.    Acceptance, E, VU,NR by NS at 11/17/2020 1145    Comment: Patient was educated about PT POC, sequencing mobility, and importance of OOB activity.                   Point: Home exercise program (Not Started)     Learner Progress:  Not documented in this visit.          Point: Body mechanics (Done)     Learning Progress Summary           Patient Acceptance, E, VU by NS at 11/18/2020 1148    Comment: Patient was educated about safety with using RW for gait.    Acceptance, E, VU,NR by NS at 11/17/2020 1145    Comment: Patient was educated about PT POC, sequencing mobility, and importance of OOB activity.                   Point: Precautions (Done)     Learning Progress Summary           Patient Acceptance, E, VU by NS at 11/18/2020 1148    Comment: Patient was educated about safety with using RW for gait.    Acceptance, E, VU,NR by NS at 11/17/2020 1145    Comment: Patient was educated about PT POC, sequencing mobility, and importance of OOB activity.                                User Key     Initials Effective Dates Name Provider Type Discipline    NS 09/10/19 -  Inna Sanchez PT Physical Therapist PT                Recommendation and Plan  Anticipated Discharge Disposition (PT): skilled nursing facility  Planned Therapy Interventions (PT): balance training, bed mobility training, gait training, home exercise program, neuromuscular re-education, transfer training, patient/family education, strengthening  Therapy Frequency (PT): daily  Plan of Care Reviewed With: patient   Progress: improving       Progress: improving  Outcome Summary: PT wound care re-eval completed. Patient presents with large unroofed blister on R anterior shin with mild B LE edema. PT able to debride moderate amount of nonviable blister debris and dressed wound with Xeroform secured with cast padding. PT applied size 4/5 compressogrip to B LE's to promote increased venous return. Patient would continue to benefit from skilled PT wound care to promote increased wound healing potential.  Plan of Care Reviewed With: patient            Time Calculation  PT Charges     Row Name 11/19/20 0945             Time Calculation    Start Time  0945  -MP      PT Goal Re-Cert Due Date  11/27/20  -MP        User Key  (r) = Recorded By, (t) = Taken By, (c) = Cosigned By    Initials Name Provider Type    MP Kike Siddiqui PT Physical Therapist            Therapy Charges for Today     Code Description Service Date Service Provider Modifiers Qty    96157282122 HC PT RE-EVAL ESTABLISHED PLAN 2 11/19/2020 Kike Siddiqui, PT GP 1    70371671043 HC MODE DEBRIDE OPEN WOUND UP TO 20CM 11/19/2020 Kike Siddiqui, PT GP 1    33610285927 HC PT MULTI LAYER COMP SYS BELOW KNEE 11/19/2020 Kike Siddiqui, PT GP 1            PT G-Codes  Outcome Measure Options: AM-PAC 6 Clicks Daily Activity (OT)  AM-PAC 6 Clicks Score (PT): 18  AM-PAC 6 Clicks Score (OT): 16       Kike Siddiqui PT  11/19/2020

## 2020-11-19 NOTE — PROGRESS NOTES
Case Management Discharge Note      Final Note: Spoke to pt's daughter and the patient will d/c with her, Mary Jo, at [facility] [place]. Pt is going out of town on Tuesday as well with family. Notified Mami at [facility] and placed additional wound care orders for HH. Mami is aware and [facility] will see pt Friday or this weekend. Called Kike with PT wound and they will bring some wound supplies to the room to send home with the pt. Pt could also benefit from a RW. Pt agreeable and no preference of DME. Called referral to [facility] and [facility]on will bring to bedside prior to d/c. Luli Leggett RN, CM ext. [phone]         Selected Continued Care - Admitted Since 11/16/2020     Destination    No services have been selected for the patient.              Durable Medical Equipment Coordination complete    Service Provider Selected Services Address Phone Fax Patient Preferred    [facility]  Durable Medical Equipment [address] [address] [phone] --          Dialysis/Infusion    No services have been selected for the patient.              Home Medical Care Coordination complete    Service Provider Selected Services Address Phone Fax Patient Preferred    [facility]  Home Health Services [address] [address] [phone] --          Therapy    No services have been selected for the patient.              Community Resources    No services have been selected for the patient.                       Final Discharge Disposition Code: 06 - home with home health care

## 2020-11-19 NOTE — PLAN OF CARE
Problem: Adult Inpatient Plan of Care  Goal: Plan of Care Review  Recent Flowsheet Documentation  Taken 11/19/2020 4104 by Inna Sanchez PT  Progress: improving  Plan of Care Reviewed With: patient  Outcome Summary: Patient continues to progress towards goals. He transferred STS with Min A and ambulated 800ft with RW and CGA, requiring cues for safe RW management. He climbed a flight of stairs with CGA and cues for utilizing step-to pattern and hand rails for safety. Daughter present for education regarding safety with gait and stairs. Anticipating d/c home today with daughter.

## 2020-11-19 NOTE — DISCHARGE SUMMARY
[facility] Medicine Services  DISCHARGE SUMMARY    Patient Name: Jesus Montiel Jr.  : 1934  MRN: [id number]    Date of Admission: 2020  3:08 PM  Date of Discharge:  20  Primary Care Physician: Provider, No Known    Consults     No orders found from 10/18/2020 to 2020.          Hospital Course     Presenting Problem:   Cellulitis of right leg [L03.115]  Cellulitis of right leg [L03.115]    Active Hospital Problems    Diagnosis  POA   • Cellulitis of right leg [L03.115]  Yes   • Failure of outpatient treatment [Z78.9]  Yes   • A-fib (CMS/HCC) [I48.91]  Yes   • Hyperlipidemia [E78.5]  Yes      Resolved Hospital Problems   No resolved problems to display.          Hospital Course:  Jesus Montiel Jr. is a 86 y.o. male with a PMHx of A.fib on Pradaxa, CAD and HLD who presented to [facility] ED c/o right lower extremity erythema w/ an open wound.      Cellulitis of RLE  Failure of outpatient treatment with keflex   -- started on vanc/rocephin () he had improvement in lower leg symptoms with improved pain and erythema. His blood cultures are no growth to date. He did have PT wound care evaluation with debridement of right lower leg blister. PT recommended wound care on discharge which will be arranged with home health and CM. He will complete a course with augmentin and doxycycline as well as probiotic.      Thrombocytopenia - remained stable      Atrial fibrillation / Pacemaker   -- CHADS-VASc 4  -- continued Pradaxa; he remained rate controlled and no longer taking amiodarone      Hyperlipidemia   -- continued statin     Discharge Follow Up Recommendations for outpatient labs/diagnostics:  PCP 1 week  Continue home health with wound care     Day of Discharge     HPI:   States he is doing ok today. Leg pain is improved. Reviewed plan for home today and discussed with patient and daughter. Reviewed need for continued antibiotics and home health/wound care with patient's  daughter. Patient and daughter are agreeable to discharge today.     Review of Systems  Gen- No fevers, chills  CV- No chest pain, palpitations  Resp- No cough, dyspnea  GI- No N/V/D, abd pain    Vital Signs:   Temp:  [98.2 °F (36.8 °C)-99.1 °F (37.3 °C)] 98.6 °F (37 °C)  Heart Rate:  [70-76] 70  Resp:  [16-18] 18  BP: (119-139)/(48-82) 120/59     Physical Exam:  Constitutional: No acute distress, awake, alert  HENT: NCAT, mucous membranes moist  Respiratory: Clear to auscultation bilaterally, respiratory effort normal   Cardiovascular: RRR, no murmurs  Gastrointestinal: Positive bowel sounds, soft, nontender, nondistended  Musculoskeletal: trace edema bilaterally; blister right lower leg wrapped; erythema greatly improved   Psychiatric: Appropriate affect, cooperative  Neurologic: Oriented x 3, strength symmetric in all extremities, Cranial Nerves grossly intact to confrontation, speech clear  Skin: No rashes    Pertinent  and/or Most Recent Results     Results from last 7 days   Lab Units 11/18/20  0811 11/17/20  0833 11/16/20  1644   WBC 10*3/mm3 7.28 6.24 6.90   HEMOGLOBIN g/dL 13.5 12.9* 13.1   HEMATOCRIT % 45.7 39.3 41.1   PLATELETS 10*3/mm3 117* 94* 114*   SODIUM mmol/L 140 140 141   POTASSIUM mmol/L 3.9 4.3 4.7   CHLORIDE mmol/L 108* 107 106   CO2 mmol/L 22.0 20.0* 29.0   BUN mg/dL 16 20 30*   CREATININE mg/dL 0.70* 0.64* 0.97   GLUCOSE mg/dL 90 82 103*   CALCIUM mg/dL 8.4* 8.3* 9.5     Results from last 7 days   Lab Units 11/16/20  1644   BILIRUBIN mg/dL 0.6   ALK PHOS U/L 89   ALT (SGPT) U/L 14   AST (SGOT) U/L 20           Invalid input(s): TG, LDLCALC, LDLREALC  Results from last 7 days   Lab Units 11/16/20  1644   LACTATE mmol/L 1.0       Brief Urine Lab Results     None          Microbiology Results Abnormal     Procedure Component Value - Date/Time    Blood Culture - Blood, Arm, Left [[id number]] Collected: 11/16/20 1629    Lab Status: Preliminary result Specimen: Blood from Arm, Left Updated: 11/18/20  1701     Blood Culture No growth at 2 days    Blood Culture - Blood, Arm, Left [[id number]] Collected: 11/16/20 1640    Lab Status: Preliminary result Specimen: Blood from Arm, Left Updated: 11/18/20 1701     Blood Culture No growth at 2 days    COVID PRE-OP / PRE-PROCEDURE SCREENING ORDER (NO ISOLATION) - Swab, Nasopharynx [[id number]] Collected: 11/16/20 2036    Lab Status: Final result Specimen: Swab from Nasopharynx Updated: 11/17/20 1248    Narrative:      The following orders were created for panel order COVID PRE-OP / PRE-PROCEDURE SCREENING ORDER (NO ISOLATION) - Swab, Nasopharynx.  Procedure                               Abnormality         Status                     ---------                               -----------         ------                     COVID-19,[facility], NP ...[[id number]]                      Final result                 Please view results for these tests on the individual orders.    COVID-19,[facility], NP SWAB IN [facility] VIRAL TRANSPORT MEDIA 24-30 HR TAT - Swab, Nasopharynx [[id number]] Collected: 11/16/20 2036    Lab Status: Final result Specimen: Swab from Nasopharynx Updated: 11/17/20 1248     SARS-CoV-2 SD Not Detected          Imaging Results (All)     None                         Plan for Follow-up of Pending Labs/Results: final blood cultures   Pending Labs     Order Current Status    Blood Culture - Blood, Arm, Left Preliminary result    Blood Culture - Blood, Arm, Left Preliminary result        Discharge Details        Discharge Medications      New Medications      Instructions Start Date   amoxicillin-clavulanate 875-125 MG per tablet  Commonly known as: Augmentin   1 tablet, Oral, 2 Times Daily      doxycycline 100 MG capsule  Commonly known as: MONODOX   100 mg, Oral, 2 Times Daily         Continue These Medications      Instructions Start Date   albuterol sulfate  (90 Base) MCG/ACT inhaler  Commonly known as: PROVENTIL HFA;VENTOLIN HFA;PROAIR HFA   2 puffs,  Inhalation, Every 6 Hours PRN      atorvastatin 20 MG tablet  Commonly known as: LIPITOR   10 mg, Oral, Daily      CALCIUM PO   Oral      coenzyme Q10 100 MG capsule   100 mg, Oral, Daily      dabigatran etexilate 150 MG capsu  Commonly known as: PRADAXA   150 mg, Oral, 2 Times Daily      multivitamin with minerals tablet tablet   Oral      rivastigmine 9.5 MG/24HR patch  Commonly known as: EXELON   1 patch, Transdermal, Daily      vitamin B-12 500 MCG tablet  Commonly known as: CYANOCOBALAMIN   Oral, Daily      VITAMIN B-6 PO   100 mg, Oral      vitamin D3 125 MCG (5000 UT) capsule capsule   5,000 Units, Oral, Daily      Zinc 25 MG tablet   Oral             No Known Allergies      Discharge Disposition:  Home-Health Care [facility]    Diet:  Hospital:  Diet Order   Procedures   • Diet Regular; Cardiac       Activity:  Activity Instructions     Activity as Tolerated            Restrictions or Other Recommendations:         CODE STATUS:    Code Status and Medical Interventions:   Ordered at: 11/16/20 2001     Level Of Support Discussed With:    Health Care Surrogate     Code Status:    CPR     Medical Interventions (Level of Support Prior to Arrest):    Full       No future appointments.    Additional Instructions for the Follow-ups that You Need to Schedule     Discharge Follow-up with PCP   As directed       Currently Documented PCP:    Provider, No Known    PCP Phone Number:    None     Follow Up Details: PCP 1 week                     Adenike Higgins DO  11/19/20      Time Spent on Discharge:  I spent  33 minutes on this discharge activity which included: face-to-face encounter with the patient, reviewing the data in the system, coordination of the care with the nursing staff as well as consultants, documentation, and entering orders.

## 2020-11-19 NOTE — PROGRESS NOTES
Continued Stay Note  [facility]     Patient Name: Jesus Montiel Jr.  MRN: [id number]  Today's Date: 11/19/2020    Admit Date: 11/16/2020    Discharge Plan     Row Name 11/19/20 1104       Plan    Plan  Back to [facility] and resumption of HH.    Patient/Family in Agreement with Plan  yes    Plan Comments  Called potential d/c for today to [facility]. Pt will return to Independent Living at [facility]. CM will follow.        Discharge Codes    No documentation.             Luli Leggett RN

## 2020-11-19 NOTE — PLAN OF CARE
Problem: Adult Inpatient Plan of Care  Goal: Plan of Care Review  Recent Flowsheet Documentation  Taken 11/19/2020 0945 by Kike Siddiqui PT  Progress: improving  Plan of Care Reviewed With: patient  Outcome Summary: PT wound care re-eval completed. Patient presents with large unroofed blister on R anterior shin with mild B LE edema. PT able to debride moderate amount of nonviable blister debris and dressed wound with Xeroform secured with cast padding. PT applied size 4/5 compressogrip to B LE's to promote increased venous return. Patient would continue to benefit from skilled PT wound care to promote increased wound healing potential.

## 2020-11-20 ENCOUNTER — READMISSION MANAGEMENT (OUTPATIENT)
Dept: CALL CENTER | Facility: HOSPITAL | Age: 85
End: 2020-11-20

## 2020-11-20 NOTE — OUTREACH NOTE
Prep Survey      Responses   [facility] facility patient discharged from?  [facility]   Is LACE score < 7 ?  No   Eligibility  Readm Mgmt   Discharge diagnosis  cellulitis of right leg   Does the patient have one of the following disease processes/diagnoses(primary or secondary)?  Other   Does the patient have Home health ordered?  Yes   What is the Home health agency?   [facility]    Is there a DME ordered?  Yes   What DME was ordered?  walker   Comments regarding appointments  no known provider   Prep survey completed?  Yes          Azalea Sanchez RN

## 2020-11-21 LAB
BACTERIA SPEC AEROBE CULT: NORMAL
BACTERIA SPEC AEROBE CULT: NORMAL

## 2020-11-24 ENCOUNTER — READMISSION MANAGEMENT (OUTPATIENT)
Dept: CALL CENTER | Facility: HOSPITAL | Age: 85
End: 2020-11-24

## 2020-11-24 NOTE — OUTREACH NOTE
Medical Week 1 Survey      Responses   [facility] patient discharged from?  [place]   Does the patient have one of the following disease processes/diagnoses(primary or secondary)?  Other   Week 1 attempt successful?  Yes   Call start time  1350   Call end time  1353   Discharge diagnosis  cellulitis of right leg   Person spoke with today (if not patient) and relationship  POA   Meds reviewed with patient/caregiver?  Yes   Is the patient having any side effects they believe may be caused by any medication additions or changes?  No   Does the patient have all medications ordered at discharge?  Yes   Prescription comments  Patient is taking antibiotics per POA   Is the patient taking all medications as directed (includes completed medication regime)?  Yes   Does the patient have a primary care provider?   Yes   Does the patient have an appointment with their PCP within 7 days of discharge?  No   Comments regarding PCP  PCP is Aleida Nascimento is a provider at assisted living [facility].  Mary Jo states she will update facility on need for appts with PCP once patient has returned to assisted living facility.     What is preventing the patient from scheduling follow up appointments within 7 days of discharge?  -- [[place] staying with POA who is providing care.  She was instructed to contact facility to schedule visit with PCP when possible.]   Has the patient kept scheduled appointments due by today?  Yes   What is the Home health agency?   [facility]    Has home health visited the patient within 72 hours of discharge?  Yes   What DME was ordered?  walker   Psychosocial issues?  No   Did the patient receive a copy of their discharge instructions?  Yes   Nursing interventions  Reviewed instructions with patient   What is the patient's perception of their health status since discharge?  Improving   Is the patient/caregiver able to teach back signs and symptoms related to disease process for when to  call PCP?  Yes   Is the patient/caregiver able to teach back signs and symptoms related to disease process for when to call 911?  Yes   Is the patient/caregiver able to teach back the hierarchy of who to call/visit for symptoms/problems? PCP, Specialist, Home health nurse, Urgent Care, ED, 911  Yes   Additional teach back comments  Some pain with dressing changes   Week 1 call completed?  Yes   Wrap up additional comments  Patient is with POA at this time she is providing care, administering meds, and aiding with dressing changes when HH not present.  She denies needs during this calll          Marjan Matias RN

## 2020-12-02 ENCOUNTER — READMISSION MANAGEMENT (OUTPATIENT)
Dept: CALL CENTER | Facility: HOSPITAL | Age: 85
End: 2020-12-02

## 2020-12-02 NOTE — OUTREACH NOTE
Medical Week 2 Survey      Responses   [facility] patient discharged from?  [place]   Does the patient have one of the following disease processes/diagnoses(primary or secondary)?  Other   Week 2 attempt successful?  No   Unsuccessful attempts  Attempt 1          Jazmin Rey RN

## 2020-12-07 ENCOUNTER — READMISSION MANAGEMENT (OUTPATIENT)
Dept: CALL CENTER | Facility: HOSPITAL | Age: 85
End: 2020-12-07

## 2020-12-07 NOTE — OUTREACH NOTE
Medical Week 2 Survey      Responses   [facility] patient discharged from?  [place]   Does the patient have one of the following disease processes/diagnoses(primary or secondary)?  Other   Week 2 attempt successful?  Yes   Call start time  1337   Discharge diagnosis  cellulitis of right leg   Call end time  1340   Is patient permission given to speak with other caregiver?  Yes   Person spoke with today (if not patient) and relationship  Mary Jo PASCUAL reviewed with patient/caregiver?  Yes   Is the patient taking all medications as directed (includes completed medication regime)?  Yes   Has the patient kept scheduled appointments due by today?  Yes   Has home health visited the patient within 72 hours of discharge?  Yes   What is the patient's perception of their health status since discharge?  Improving   If the patient is a current smoker, are they able to teach back resources for cessation?  Not a smoker   Week 2 Call Completed?  Yes   Wrap up additional comments  Spoke with Mary Jo PASCUAL. Patient has moved back into assisted living.  HH making visists and patient continues to improve.           Ellen Bhagat, RN

## 2021-02-05 ENCOUNTER — HOSPITAL ENCOUNTER (EMERGENCY)
Facility: HOSPITAL | Age: 86
Discharge: NURSING, REHAB OR LONG-TERM CARE | End: 2021-02-06
Attending: EMERGENCY MEDICINE | Admitting: EMERGENCY MEDICINE

## 2021-02-05 DIAGNOSIS — K59.00 CONSTIPATION, UNSPECIFIED CONSTIPATION TYPE: Primary | ICD-10-CM

## 2021-02-05 DIAGNOSIS — R10.84 GENERALIZED ABDOMINAL PAIN: ICD-10-CM

## 2021-02-05 PROCEDURE — 99284 EMERGENCY DEPT VISIT MOD MDM: CPT

## 2021-02-06 ENCOUNTER — APPOINTMENT (OUTPATIENT)
Dept: CT IMAGING | Facility: HOSPITAL | Age: 86
End: 2021-02-06

## 2021-02-06 VITALS
RESPIRATION RATE: 16 BRPM | DIASTOLIC BLOOD PRESSURE: 68 MMHG | WEIGHT: 145 LBS | SYSTOLIC BLOOD PRESSURE: 124 MMHG | TEMPERATURE: 98 F | HEART RATE: 76 BPM | OXYGEN SATURATION: 97 % | HEIGHT: 67 IN | BODY MASS INDEX: 22.76 KG/M2

## 2021-02-06 LAB
ALBUMIN SERPL-MCNC: 3.8 G/DL (ref 3.5–5.2)
ALBUMIN/GLOB SERPL: 1.2 G/DL
ALP SERPL-CCNC: 87 U/L (ref 39–117)
ALT SERPL W P-5'-P-CCNC: 16 U/L (ref 1–41)
ANION GAP SERPL CALCULATED.3IONS-SCNC: 8 MMOL/L (ref 5–15)
AST SERPL-CCNC: 27 U/L (ref 1–40)
BASOPHILS # BLD AUTO: 0.02 10*3/MM3 (ref 0–0.2)
BASOPHILS NFR BLD AUTO: 0.4 % (ref 0–1.5)
BILIRUB SERPL-MCNC: 0.7 MG/DL (ref 0–1.2)
BILIRUB UR QL STRIP: NEGATIVE
BUN SERPL-MCNC: 24 MG/DL (ref 8–23)
BUN/CREAT SERPL: 27.3 (ref 7–25)
CALCIUM SPEC-SCNC: 9.5 MG/DL (ref 8.6–10.5)
CHLORIDE SERPL-SCNC: 105 MMOL/L (ref 98–107)
CLARITY UR: CLEAR
CO2 SERPL-SCNC: 30 MMOL/L (ref 22–29)
COLOR UR: YELLOW
CREAT SERPL-MCNC: 0.88 MG/DL (ref 0.76–1.27)
D-LACTATE SERPL-SCNC: 1.1 MMOL/L (ref 0.5–2)
DEPRECATED RDW RBC AUTO: 48.9 FL (ref 37–54)
EOSINOPHIL # BLD AUTO: 0.06 10*3/MM3 (ref 0–0.4)
EOSINOPHIL NFR BLD AUTO: 1.1 % (ref 0.3–6.2)
ERYTHROCYTE [DISTWIDTH] IN BLOOD BY AUTOMATED COUNT: 14.2 % (ref 12.3–15.4)
GFR SERPL CREATININE-BSD FRML MDRD: 82 ML/MIN/1.73
GLOBULIN UR ELPH-MCNC: 3.1 GM/DL
GLUCOSE SERPL-MCNC: 102 MG/DL (ref 65–99)
GLUCOSE UR STRIP-MCNC: NEGATIVE MG/DL
HCT VFR BLD AUTO: 43 % (ref 37.5–51)
HGB BLD-MCNC: 13.8 G/DL (ref 13–17.7)
HGB UR QL STRIP.AUTO: NEGATIVE
IMM GRANULOCYTES # BLD AUTO: 0.03 10*3/MM3 (ref 0–0.05)
IMM GRANULOCYTES NFR BLD AUTO: 0.5 % (ref 0–0.5)
KETONES UR QL STRIP: ABNORMAL
LEUKOCYTE ESTERASE UR QL STRIP.AUTO: NEGATIVE
LIPASE SERPL-CCNC: 23 U/L (ref 13–60)
LYMPHOCYTES # BLD AUTO: 0.84 10*3/MM3 (ref 0.7–3.1)
LYMPHOCYTES NFR BLD AUTO: 14.8 % (ref 19.6–45.3)
MCH RBC QN AUTO: 30 PG (ref 26.6–33)
MCHC RBC AUTO-ENTMCNC: 32.1 G/DL (ref 31.5–35.7)
MCV RBC AUTO: 93.5 FL (ref 79–97)
MONOCYTES # BLD AUTO: 0.56 10*3/MM3 (ref 0.1–0.9)
MONOCYTES NFR BLD AUTO: 9.9 % (ref 5–12)
NEUTROPHILS NFR BLD AUTO: 4.15 10*3/MM3 (ref 1.7–7)
NEUTROPHILS NFR BLD AUTO: 73.3 % (ref 42.7–76)
NITRITE UR QL STRIP: NEGATIVE
NRBC BLD AUTO-RTO: 0 /100 WBC (ref 0–0.2)
PH UR STRIP.AUTO: 7 [PH] (ref 5–8)
PLATELET # BLD AUTO: 102 10*3/MM3 (ref 140–450)
PMV BLD AUTO: 11.8 FL (ref 6–12)
POTASSIUM SERPL-SCNC: 4.1 MMOL/L (ref 3.5–5.2)
PROT SERPL-MCNC: 6.9 G/DL (ref 6–8.5)
PROT UR QL STRIP: NEGATIVE
RBC # BLD AUTO: 4.6 10*6/MM3 (ref 4.14–5.8)
SODIUM SERPL-SCNC: 143 MMOL/L (ref 136–145)
SP GR UR STRIP: 1.02 (ref 1–1.03)
UROBILINOGEN UR QL STRIP: ABNORMAL
WBC # BLD AUTO: 5.66 10*3/MM3 (ref 3.4–10.8)

## 2021-02-06 PROCEDURE — 83605 ASSAY OF LACTIC ACID: CPT | Performed by: NURSE PRACTITIONER

## 2021-02-06 PROCEDURE — 83690 ASSAY OF LIPASE: CPT | Performed by: NURSE PRACTITIONER

## 2021-02-06 PROCEDURE — 85025 COMPLETE CBC W/AUTO DIFF WBC: CPT | Performed by: NURSE PRACTITIONER

## 2021-02-06 PROCEDURE — 74177 CT ABD & PELVIS W/CONTRAST: CPT

## 2021-02-06 PROCEDURE — 25010000002 IOPAMIDOL 61 % SOLUTION: Performed by: EMERGENCY MEDICINE

## 2021-02-06 PROCEDURE — 81003 URINALYSIS AUTO W/O SCOPE: CPT | Performed by: NURSE PRACTITIONER

## 2021-02-06 PROCEDURE — 80053 COMPREHEN METABOLIC PANEL: CPT | Performed by: NURSE PRACTITIONER

## 2021-02-06 RX ORDER — SODIUM CHLORIDE 0.9 % (FLUSH) 0.9 %
10 SYRINGE (ML) INJECTION AS NEEDED
Status: DISCONTINUED | OUTPATIENT
Start: 2021-02-06 | End: 2021-02-06 | Stop reason: HOSPADM

## 2021-02-06 RX ADMIN — MINERAL OIL 300 ML: 1000 LIQUID ORAL at 05:00

## 2021-02-06 RX ADMIN — SODIUM CHLORIDE 500 ML: 9 INJECTION, SOLUTION INTRAVENOUS at 01:07

## 2021-02-06 RX ADMIN — IOPAMIDOL 85 ML: 612 INJECTION, SOLUTION INTRAVENOUS at 02:12

## 2021-02-06 NOTE — ED NOTES
PT A&O X4. PT  VERBALIZES NO FURTHER NEEDS AT THIS TIME. PT DOES NOT APPEAR TO BE IN ANY DISTRESS AT THIS TIME. CALL RAMSAY IN REACH.        Clementina Parsons, RN  02/06/21 4309

## 2021-02-06 NOTE — ED PROVIDER NOTES
Subjective   The patient is a 87 y.o. year old male presenting to the ED complaining of diffuse abdominal pain that began this morning. His last bowel movement was yesterday. He denies nausea, vomiting, diarrhea, chest pain, and shortness of breath. The patient is a current resident in an assisted living facility. The patient took a laxative but states it did not improve his symptoms. He denies known COVID-19 contacts. The patient has pertinent medical history of hyperlipidemia, hypertension, and myocardial infarction. The patient currently lists no other acute symptoms at this time.       History provided by:  Patient  Constipation  Severity:  Moderate  Time since last bowel movement:  1 day  Timing:  Constant  Progression:  Unchanged  Chronicity:  New  Stool description:  None produced  Ineffective treatments:  Laxatives  Associated symptoms: abdominal pain (diffuse)    Associated symptoms: no diarrhea, no dysuria, no fever, no nausea and no vomiting        Review of Systems   Constitutional: Negative for chills and fever.   Respiratory: Negative for cough and shortness of breath.    Cardiovascular: Negative for chest pain.   Gastrointestinal: Positive for abdominal pain (diffuse) and constipation. Negative for diarrhea, nausea and vomiting.   Genitourinary: Negative for difficulty urinating and dysuria.   Neurological: Negative for dizziness and weakness.   All other systems reviewed and are negative.      Past Medical History:   Diagnosis Date   • Hyperlipidemia    • Hypertension    • Meniere disease    • Myocardial infarction (CMS/HCC)    • Tremor        No Known Allergies    Past Surgical History:   Procedure Laterality Date   • ABLATION OF DYSRHYTHMIC FOCUS     • CATARACT EXTRACTION, BILATERAL     • CORONARY ANGIOPLASTY WITH STENT PLACEMENT     • CORONARY ARTERY BYPASS BRING BACK FOR EXPLORATION     • HERNIA REPAIR     • INNER EAR SURGERY     • PACEMAKER IMPLANTATION         History reviewed. No pertinent  family history.    Social History     Socioeconomic History   • Marital status:      Spouse name: Not on file   • Number of children: Not on file   • Years of education: Not on file   • Highest education level: Not on file   Tobacco Use   • Smoking status: Never Smoker   • Smokeless tobacco: Never Used   Substance and Sexual Activity   • Alcohol use: Yes     Alcohol/week: 4.0 standard drinks     Types: 4 Glasses of wine per week   • Drug use: No   • Sexual activity: Defer         Objective   Physical Exam  Vitals signs and nursing note reviewed. Exam conducted with a chaperone present.   Constitutional:       Appearance: Normal appearance. He is well-developed. He is not ill-appearing or toxic-appearing.   HENT:      Head: Normocephalic and atraumatic.      Nose: Nose normal.      Mouth/Throat:      Mouth: Mucous membranes are moist.   Eyes:      General: Lids are normal.      Conjunctiva/sclera: Conjunctivae normal.   Neck:      Musculoskeletal: Full passive range of motion without pain and normal range of motion.      Trachea: Trachea normal.   Cardiovascular:      Rate and Rhythm: Regular rhythm.      Pulses: Normal pulses.      Heart sounds: Normal heart sounds.   Pulmonary:      Effort: Pulmonary effort is normal. No respiratory distress.      Breath sounds: Normal breath sounds. No decreased breath sounds, wheezing, rhonchi or rales.   Abdominal:      General: Bowel sounds are normal.      Palpations: Abdomen is soft.      Tenderness: There is abdominal tenderness (diffuse tenderness).   Genitourinary:     Comments: Soft stool in rectal vault  Musculoskeletal: Normal range of motion.   Skin:     General: Skin is warm and dry.      Findings: No rash.   Neurological:      Mental Status: He is alert and oriented to person, place, and time.      Cranial Nerves: No cranial nerve deficit.   Psychiatric:         Speech: Speech normal.         Behavior: Behavior normal. Behavior is cooperative.          Procedures         ED Course  ED Course as of Feb 06 2137   Sat Feb 06, 2021   0443 Attempted to disimpact the patient.  Pt had multiple hard stools removed.  Pt at this time requests a enema. Nurse notified.     [KG]   0530 Patient tolerated enema.  Patient following the enema began to have some bleeding.  I did discuss with patient if this continued to return.  Patient is on Pradaxa.  I spoke with the doctor.  His hemoglobin is 13 at this time.  Patient was given restrict return precautions.    [KG]   2135 I spoke with the patient's daughter this evening regarding patient's results.  Patient's bleeding has decreased greatly.  Patient is to continue to monitor.  Patient is to return as needed.    [KG]      ED Course User Index  [KG] Ann Ramirez, JAIMIE           Recent Results (from the past 24 hour(s))   Comprehensive Metabolic Panel    Collection Time: 02/06/21  1:06 AM    Specimen: Blood   Result Value Ref Range    Glucose 102 (H) 65 - 99 mg/dL    BUN 24 (H) 8 - 23 mg/dL    Creatinine 0.88 0.76 - 1.27 mg/dL    Sodium 143 136 - 145 mmol/L    Potassium 4.1 3.5 - 5.2 mmol/L    Chloride 105 98 - 107 mmol/L    CO2 30.0 (H) 22.0 - 29.0 mmol/L    Calcium 9.5 8.6 - 10.5 mg/dL    Total Protein 6.9 6.0 - 8.5 g/dL    Albumin 3.80 3.50 - 5.20 g/dL    ALT (SGPT) 16 1 - 41 U/L    AST (SGOT) 27 1 - 40 U/L    Alkaline Phosphatase 87 39 - 117 U/L    Total Bilirubin 0.7 0.0 - 1.2 mg/dL    eGFR Non African Amer 82 >60 mL/min/1.73    Globulin 3.1 gm/dL    A/G Ratio 1.2 g/dL    BUN/Creatinine Ratio 27.3 (H) 7.0 - 25.0    Anion Gap 8.0 5.0 - 15.0 mmol/L   Lipase    Collection Time: 02/06/21  1:06 AM    Specimen: Blood   Result Value Ref Range    Lipase 23 13 - 60 U/L   Lactic Acid, Plasma    Collection Time: 02/06/21  1:06 AM    Specimen: Blood   Result Value Ref Range    Lactate 1.1 0.5 - 2.0 mmol/L   CBC Auto Differential    Collection Time: 02/06/21  1:06 AM    Specimen: Blood   Result Value Ref Range    WBC 5.66 3.40  - 10.80 10*3/mm3    RBC 4.60 4.14 - 5.80 10*6/mm3    Hemoglobin 13.8 13.0 - 17.7 g/dL    Hematocrit 43.0 37.5 - 51.0 %    MCV 93.5 79.0 - 97.0 fL    MCH 30.0 26.6 - 33.0 pg    MCHC 32.1 31.5 - 35.7 g/dL    RDW 14.2 12.3 - 15.4 %    RDW-SD 48.9 37.0 - 54.0 fl    MPV 11.8 6.0 - 12.0 fL    Platelets 102 (L) 140 - 450 10*3/mm3    Neutrophil % 73.3 42.7 - 76.0 %    Lymphocyte % 14.8 (L) 19.6 - 45.3 %    Monocyte % 9.9 5.0 - 12.0 %    Eosinophil % 1.1 0.3 - 6.2 %    Basophil % 0.4 0.0 - 1.5 %    Immature Grans % 0.5 0.0 - 0.5 %    Neutrophils, Absolute 4.15 1.70 - 7.00 10*3/mm3    Lymphocytes, Absolute 0.84 0.70 - 3.10 10*3/mm3    Monocytes, Absolute 0.56 0.10 - 0.90 10*3/mm3    Eosinophils, Absolute 0.06 0.00 - 0.40 10*3/mm3    Basophils, Absolute 0.02 0.00 - 0.20 10*3/mm3    Immature Grans, Absolute 0.03 0.00 - 0.05 10*3/mm3    nRBC 0.0 0.0 - 0.2 /100 WBC   Urinalysis With Microscopic If Indicated (No Culture) - Urine, Clean Catch    Collection Time: 02/06/21  2:20 AM    Specimen: Urine, Clean Catch   Result Value Ref Range    Color, UA Yellow Yellow, Straw    Appearance, UA Clear Clear    pH, UA 7.0 5.0 - 8.0    Specific Gravity, UA 1.017 1.001 - 1.030    Glucose, UA Negative Negative    Ketones, UA Trace (A) Negative    Bilirubin, UA Negative Negative    Blood, UA Negative Negative    Protein, UA Negative Negative    Leuk Esterase, UA Negative Negative    Nitrite, UA Negative Negative    Urobilinogen, UA 0.2 E.U./dL 0.2 - 1.0 E.U./dL     Note: In addition to lab results from this visit, the labs listed above may include labs taken at another facility or during a different encounter within the last 24 hours. Please correlate lab times with ED admission and discharge times for further clarification of the services performed during this visit.    CT Abdomen Pelvis With Contrast   Final Result   1. Thick-walled urinary bladder concerning for cystitis.   2. Enlarged prostate with mass effect on the bladder base.   3. Large  amount stool in the lower colon with distention of the rectal vault concerning for potential impaction.   4. Normal appendix and small bowel.   5. Additional findings as above.               Signer Name: Cheko Vasquez MD    Signed: 2/6/2021 2:36 AM    Workstation Name: [email or web address]     [facility]        Vitals:    02/06/21 0600 02/06/21 0730 02/06/21 0800 02/06/21 0849   BP: 131/68 117/59 114/60 124/68   BP Location:    Right arm   Patient Position:    Sitting   Pulse:  74 74 76   Resp:  16 16 16   Temp:    98 °F (36.7 °C)   TempSrc:    Oral   SpO2:  96% 97% 97%   Weight:       Height:         Medications   sodium chloride 0.9 % bolus 500 mL (0 mL Intravenous Stopped 2/6/21 0307)   iopamidol (ISOVUE-300) 61 % injection 100 mL (85 mL Intravenous Given 2/6/21 0212)   0.9% sodium chloride for irrigation-mineral oil-glycerin (SMOG) enema 300 mL (300 mL Rectal Given 2/6/21 0500)     ECG/EMG Results (last 24 hours)     ** No results found for the last 24 hours. **        No orders to display       DISCHARGE    Patient discharged in stable condition.    Reviewed implications of results, diagnosis, meds, responsibility to follow up, warning signs and symptoms of possible worsening, potential complications and reasons to return to ER.    Patient/Family voiced understanding of above instructions.    Discussed plan for discharge, as there is no emergent indication for admission.  Pt/family is agreeable and understands need for follow up and possible repeat testing.  Pt/family is aware that discharge does not mean that nothing is wrong but that it indicates no emergency is currently present that requires admission and they must continue care with follow-up as given below or with a physician of their choice.     FOLLOW-UP  System, Provider Not In  [place] [id number]               Medication List      No changes were made to your prescriptions during this visit.                                            MDM    Final diagnoses:   Constipation, unspecified constipation type   Generalized abdominal pain       Documentation assistance provided by chapito Vallecillo.  Information recorded by the chapito was done at my direction and has been verified and validated by me.     Jose Luis Vallecillo  02/06/21 0239       Jose Luis Vallecillo  02/06/21 0246       Jose Luis Vallecillo  02/06/21 0250       Ann Ramirez APRN  02/06/21 1263

## 2021-02-16 ENCOUNTER — HOSPITAL ENCOUNTER (EMERGENCY)
Facility: HOSPITAL | Age: 86
Discharge: HOME | End: 2021-02-17
Attending: EMERGENCY MEDICINE | Admitting: EMERGENCY MEDICINE

## 2021-02-16 ENCOUNTER — APPOINTMENT (OUTPATIENT)
Dept: GENERAL RADIOLOGY | Facility: HOSPITAL | Age: 86
End: 2021-02-16

## 2021-02-16 DIAGNOSIS — Z78.9 PACED RHYTHM ON CARDIAC MONITOR: ICD-10-CM

## 2021-02-16 DIAGNOSIS — I87.2 VENOUS STASIS DERMATITIS OF BOTH LOWER EXTREMITIES: Primary | ICD-10-CM

## 2021-02-16 DIAGNOSIS — S80.821A BLISTER (NONTHERMAL), RIGHT LOWER LEG, INITIAL ENCOUNTER: ICD-10-CM

## 2021-02-16 LAB
ALBUMIN SERPL-MCNC: 3.8 G/DL (ref 3.5–5.2)
ALBUMIN/GLOB SERPL: 1.4 G/DL
ALP SERPL-CCNC: 82 U/L (ref 39–117)
ALT SERPL W P-5'-P-CCNC: 17 U/L (ref 1–41)
ANION GAP SERPL CALCULATED.3IONS-SCNC: 8 MMOL/L (ref 5–15)
AST SERPL-CCNC: 24 U/L (ref 1–40)
BASOPHILS # BLD AUTO: 0.03 10*3/MM3 (ref 0–0.2)
BASOPHILS NFR BLD AUTO: 0.5 % (ref 0–1.5)
BILIRUB SERPL-MCNC: 0.4 MG/DL (ref 0–1.2)
BUN SERPL-MCNC: 24 MG/DL (ref 8–23)
BUN/CREAT SERPL: 23.1 (ref 7–25)
CALCIUM SPEC-SCNC: 9.1 MG/DL (ref 8.6–10.5)
CHLORIDE SERPL-SCNC: 108 MMOL/L (ref 98–107)
CO2 SERPL-SCNC: 30 MMOL/L (ref 22–29)
CREAT SERPL-MCNC: 1.04 MG/DL (ref 0.76–1.27)
D DIMER PPP FEU-MCNC: 0.51 MCGFEU/ML (ref 0–0.56)
DEPRECATED RDW RBC AUTO: 48.6 FL (ref 37–54)
EOSINOPHIL # BLD AUTO: 0.07 10*3/MM3 (ref 0–0.4)
EOSINOPHIL NFR BLD AUTO: 1.3 % (ref 0.3–6.2)
ERYTHROCYTE [DISTWIDTH] IN BLOOD BY AUTOMATED COUNT: 14 % (ref 12.3–15.4)
GFR SERPL CREATININE-BSD FRML MDRD: 68 ML/MIN/1.73
GLOBULIN UR ELPH-MCNC: 2.7 GM/DL
GLUCOSE SERPL-MCNC: 97 MG/DL (ref 65–99)
HCT VFR BLD AUTO: 41.1 % (ref 37.5–51)
HGB BLD-MCNC: 13.2 G/DL (ref 13–17.7)
HOLD SPECIMEN: NORMAL
IMM GRANULOCYTES # BLD AUTO: 0.01 10*3/MM3 (ref 0–0.05)
IMM GRANULOCYTES NFR BLD AUTO: 0.2 % (ref 0–0.5)
LYMPHOCYTES # BLD AUTO: 1.63 10*3/MM3 (ref 0.7–3.1)
LYMPHOCYTES NFR BLD AUTO: 29.5 % (ref 19.6–45.3)
MAGNESIUM SERPL-MCNC: 2 MG/DL (ref 1.6–2.4)
MCH RBC QN AUTO: 30.2 PG (ref 26.6–33)
MCHC RBC AUTO-ENTMCNC: 32.1 G/DL (ref 31.5–35.7)
MCV RBC AUTO: 94.1 FL (ref 79–97)
MONOCYTES # BLD AUTO: 0.83 10*3/MM3 (ref 0.1–0.9)
MONOCYTES NFR BLD AUTO: 15 % (ref 5–12)
NEUTROPHILS NFR BLD AUTO: 2.95 10*3/MM3 (ref 1.7–7)
NEUTROPHILS NFR BLD AUTO: 53.5 % (ref 42.7–76)
NRBC BLD AUTO-RTO: 0 /100 WBC (ref 0–0.2)
NT-PROBNP SERPL-MCNC: 603.9 PG/ML (ref 0–1800)
PLATELET # BLD AUTO: 122 10*3/MM3 (ref 140–450)
PMV BLD AUTO: 11.4 FL (ref 6–12)
POTASSIUM SERPL-SCNC: 4.2 MMOL/L (ref 3.5–5.2)
PROCALCITONIN SERPL-MCNC: 0.04 NG/ML (ref 0–0.25)
PROT SERPL-MCNC: 6.5 G/DL (ref 6–8.5)
RBC # BLD AUTO: 4.37 10*6/MM3 (ref 4.14–5.8)
SODIUM SERPL-SCNC: 146 MMOL/L (ref 136–145)
TSH SERPL DL<=0.05 MIU/L-ACNC: 2.65 UIU/ML (ref 0.27–4.2)
WBC # BLD AUTO: 5.52 10*3/MM3 (ref 3.4–10.8)
WHOLE BLOOD HOLD SPECIMEN: NORMAL
WHOLE BLOOD HOLD SPECIMEN: NORMAL

## 2021-02-16 PROCEDURE — 93005 ELECTROCARDIOGRAM TRACING: CPT | Performed by: EMERGENCY MEDICINE

## 2021-02-16 PROCEDURE — 85025 COMPLETE CBC W/AUTO DIFF WBC: CPT | Performed by: EMERGENCY MEDICINE

## 2021-02-16 PROCEDURE — 83735 ASSAY OF MAGNESIUM: CPT | Performed by: EMERGENCY MEDICINE

## 2021-02-16 PROCEDURE — 71045 X-RAY EXAM CHEST 1 VIEW: CPT

## 2021-02-16 PROCEDURE — 99284 EMERGENCY DEPT VISIT MOD MDM: CPT

## 2021-02-16 PROCEDURE — 85379 FIBRIN DEGRADATION QUANT: CPT | Performed by: EMERGENCY MEDICINE

## 2021-02-16 PROCEDURE — 80053 COMPREHEN METABOLIC PANEL: CPT | Performed by: EMERGENCY MEDICINE

## 2021-02-16 PROCEDURE — 84443 ASSAY THYROID STIM HORMONE: CPT | Performed by: EMERGENCY MEDICINE

## 2021-02-16 PROCEDURE — 84145 PROCALCITONIN (PCT): CPT | Performed by: EMERGENCY MEDICINE

## 2021-02-16 PROCEDURE — 83880 ASSAY OF NATRIURETIC PEPTIDE: CPT | Performed by: EMERGENCY MEDICINE

## 2021-02-16 RX ORDER — SODIUM CHLORIDE 0.9 % (FLUSH) 0.9 %
10 SYRINGE (ML) INJECTION AS NEEDED
Status: DISCONTINUED | OUTPATIENT
Start: 2021-02-16 | End: 2021-02-17 | Stop reason: HOSPADM

## 2021-02-16 RX ORDER — HYDROCODONE BITARTRATE AND ACETAMINOPHEN 5; 325 MG/1; MG/1
0.5 TABLET ORAL ONCE
Status: COMPLETED | OUTPATIENT
Start: 2021-02-16 | End: 2021-02-16

## 2021-02-16 RX ORDER — SODIUM CHLORIDE 9 MG/ML
10 INJECTION INTRAVENOUS AS NEEDED
Status: DISCONTINUED | OUTPATIENT
Start: 2021-02-16 | End: 2021-02-17 | Stop reason: HOSPADM

## 2021-02-16 RX ORDER — CEPHALEXIN 500 MG/1
500 CAPSULE ORAL 2 TIMES DAILY
Qty: 14 CAPSULE | Refills: 0 | Status: SHIPPED | OUTPATIENT
Start: 2021-02-16 | End: 2021-02-23

## 2021-02-16 RX ADMIN — HYDROCODONE BITARTRATE AND ACETAMINOPHEN 0.5 TABLET: 5; 325 TABLET ORAL at 23:53

## 2021-02-17 VITALS
TEMPERATURE: 98.2 F | DIASTOLIC BLOOD PRESSURE: 56 MMHG | HEART RATE: 70 BPM | WEIGHT: 145 LBS | SYSTOLIC BLOOD PRESSURE: 115 MMHG | HEIGHT: 68 IN | RESPIRATION RATE: 16 BRPM | OXYGEN SATURATION: 90 % | BODY MASS INDEX: 21.98 KG/M2

## 2021-02-17 LAB
QT INTERVAL: 440 MS
QTC INTERVAL: 478 MS

## 2021-02-17 PROCEDURE — 96365 THER/PROPH/DIAG IV INF INIT: CPT

## 2021-02-17 PROCEDURE — 25010000002 CEFTRIAXONE PER 250 MG: Performed by: EMERGENCY MEDICINE

## 2021-02-17 RX ADMIN — SODIUM CHLORIDE 1 G: 900 INJECTION INTRAVENOUS at 00:07

## 2021-02-17 NOTE — ED PROVIDER NOTES
"Subjective   This is a pleasant very hard of hearing 87-year-old male brought the emergency room today by [facility] for a \"code AMI\".  Initial call out was for a blister on his right leg.  Patient is apparently had no chest pain.  He is brought from an assisted living.    This is a patient arrived in the ER I quickly reviewed his EMS EKG was quite apparent that he had a left bundle branch block and a paced rhythm and so the code AMI was canceled.    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  His daughter called I was able to get a little bit more history from her.    At the base line the patient lives in his assisted living and gets around without the use of a cane or a walker though he spends a lot of time sitting with his legs drooped over.  In discussions with he and his daughter review of old chart it sounds like he has had problems chronically with his right leg with swelling and cellulitis and the daughter thinks he was getting some sort of care at the assisted living but again is a bit unsure about this.    Currently patient only complains of a little bit of pain in his right leg at the area of the blister.  He cannot recall any trauma to the area.  Said he has not fallen.  Said his appetite's been fair at best.    Apparently is seen by nurse practitioner at his nursing facility on a fairly regular basis.        Attempted to obtain other review of systems but it was difficult.          Review of Systems   Unable to perform ROS: Other       Past Medical History:   Diagnosis Date   • Hyperlipidemia    • Hypertension    • Meniere disease    • Myocardial infarction (CMS/HCC)    • Tremor        No Known Allergies    Past Surgical History:   Procedure Laterality Date   • ABLATION OF DYSRHYTHMIC FOCUS     • CATARACT EXTRACTION, BILATERAL     • CORONARY ANGIOPLASTY WITH STENT PLACEMENT     • CORONARY ARTERY BYPASS BRING BACK " FOR EXPLORATION     • HERNIA REPAIR     • INNER EAR SURGERY     • PACEMAKER IMPLANTATION         No family history on file.    Social History     Socioeconomic History   • Marital status:      Spouse name: Not on file   • Number of children: Not on file   • Years of education: Not on file   • Highest education level: Not on file   Tobacco Use   • Smoking status: Never Smoker   • Smokeless tobacco: Never Used   Substance and Sexual Activity   • Alcohol use: Yes     Alcohol/week: 4.0 standard drinks     Types: 4 Glasses of wine per week   • Drug use: No   • Sexual activity: Defer           Objective   Physical Exam  Vitals signs and nursing note reviewed. Exam conducted with a chaperone present.   Constitutional:       Comments: He is alert but very hard of hearing he gives some history but it is quite difficult to get this.   HENT:      Head: Normocephalic and atraumatic.      Right Ear: External ear normal.      Left Ear: External ear normal.      Nose: Nose normal.      Mouth/Throat:      Mouth: Mucous membranes are moist.      Pharynx: Oropharynx is clear.   Eyes:      Extraocular Movements: Extraocular movements intact.      Conjunctiva/sclera: Conjunctivae normal.      Pupils: Pupils are equal, round, and reactive to light.   Neck:      Musculoskeletal: Normal range of motion and neck supple. No neck rigidity or muscular tenderness.   Cardiovascular:      Rate and Rhythm: Normal rate and regular rhythm.      Pulses: Normal pulses.      Heart sounds: Normal heart sounds.      Comments: He has a pacemaker in his left chest.  He has a well healed midline sternotomy scar little pectus excavatum.  Pulmonary:      Effort: Pulmonary effort is normal.      Breath sounds: Normal breath sounds.   Abdominal:      General: Abdomen is flat. Bowel sounds are normal. There is no distension.      Palpations: Abdomen is soft. There is no mass.      Tenderness: There is no abdominal tenderness.   Musculoskeletal:       Comments: Upper extremities have a bit of muscle wasting he has equal pulses and there is no edema, synovitis, or rash.  His lower extremities show fairly extensive venous stasis changes certainly worse in his right and his left leg.  He has little warmth there but not an obvious high-grade cellulitis.  He has 2/4 pulses in his feet no tissue loss.    On his right leg he has a large blister about this sized on the underside filled with clear fluid.  It is weeping.   Lymphadenopathy:      Cervical: No cervical adenopathy.   Skin:     General: Skin is warm and dry.      Capillary Refill: Capillary refill takes less than 2 seconds.   Neurological:      Mental Status: He is alert.      Comments: Alert very hard of hearing probably has some mild dementia he moves all extremities symmetrically.  His voice is soft and his tongue is midline.  Vision and speech are preserved but hearing is quite decreased.         Wound Care    Date/Time: 2/17/2021 12:46 AM  Performed by: Frank Parr MD  Authorized by: Frank Parr MD     Comments:      I spoke with the patient and his daughter via phone about my plans for wound care for the patient.  They agreed to proceed.  I thought the most helpful thing with the roof of this large blister and applied in a boot.  Patient was agreeable.    His right leg was sprayed with wound cleanser and then this large blister was bluntly dissected and a large amount of clear fluid was obtained the blister was cut away in its entirety.  Additional wound cleanser was applied.  Wound was then patted dry with sterile 4 x 4's and an Unna boot was applied.  Stockinette was then applied over the Unna boot.  Patient tolerated this well that he did have some burning on the blister with application of the wound cleanser.  Post procedure he remained with 2/4 pulses in his right foot.    On the left side is venous stasis changes were cleansed with wound cleanser and then Curlex and Ace wrap were  applied.    No immediate complications               ED Course  ED Course as of Feb 17 0049   Perrymiguelito Feb 16, 2021   23[facility]: 32.1 [ER]      ED Course User Index  [ER] Frank Parr MD                                           MDM  Number of Diagnoses or Management Options  Blister (nonthermal), right lower leg, initial encounter:   Paced rhythm on cardiac monitor:   Venous stasis dermatitis of both lower extremities:   Diagnosis management comments:           I reviewed all available studies at the bedside with the patient and his daughter via phone.  His labs are actually quite bland he has no evidence of heart failure.  His EKG shows a paced rhythm and no evidence of myocardial infarction the patient has no symptoms to suggest MI currently.    I had given him 1 dose of IV antibiotic here to cover him for the redness in his legs but certainly is not toxic or septic this very well may be just dermatitis and inflammation.  This point I do not think he needs admission to the hospital but I do think he needs early follow-up with our wound care department and I have referred him and have asked his daughter to call to help facilitate this.  He will also need early follow-up with his PCP to help coordinate his care.    I have encouraged him to elevate his legs as much as he can.  Put him on a short course of Keflex as well.    He will return to the ER if worse in any way.    All are agreeable with the plan       Amount and/or Complexity of Data Reviewed  Clinical lab tests: reviewed  Tests in the radiology section of CPT®: reviewed  Tests in the medicine section of CPT®: reviewed        Final diagnoses:   Venous stasis dermatitis of both lower extremities   Blister (nonthermal), right lower leg, initial encounter   Paced rhythm on cardiac monitor            Frank Parr MD  02/17/21 0049

## 2021-02-17 NOTE — DISCHARGE INSTRUCTIONS
Elevate your legs as much as possible.  Certainly when you are sitting down try to keep your legs higher than the level of your heart

## 2021-02-19 ENCOUNTER — HOSPITAL ENCOUNTER (OUTPATIENT)
Dept: PHYSICAL THERAPY | Facility: HOSPITAL | Age: 86
Setting detail: THERAPIES SERIES
Discharge: HOME | End: 2021-02-19

## 2021-02-19 DIAGNOSIS — S81.801D OPEN WOUND OF RIGHT LOWER EXTREMITY, SUBSEQUENT ENCOUNTER: Primary | ICD-10-CM

## 2021-02-19 DIAGNOSIS — I87.8 VENOUS STASIS OF BOTH LOWER EXTREMITIES: ICD-10-CM

## 2021-02-19 DIAGNOSIS — R60.0 BILATERAL LOWER EXTREMITY EDEMA: ICD-10-CM

## 2021-02-19 PROCEDURE — 97162 PT EVAL MOD COMPLEX 30 MIN: CPT

## 2021-02-19 NOTE — THERAPY EVALUATION
"    Outpatient Rehabilitation - Wound/Debridement Initial Eval   [facility]     Patient Name: Jesus Montiel Jr.  : 1934  MRN: [id number]  Today's Date: 2021                    Admit Date: 2021    Visit Dx:    ICD-10-CM ICD-9-CM   1. Open wound of right lower extremity, subsequent encounter  S81.801D V58.89     891.0   2. Venous stasis of both lower extremities  I87.8 459.81   3. Bilateral lower extremity edema  R60.0 782.3       Patient Active Problem List   Diagnosis   • Cellulitis of right leg   • Failure of outpatient treatment   • Hypertension   • Hyperlipidemia   • A-fib (CMS/[facility])        Past Medical History:   Diagnosis Date   • Hyperlipidemia    • Hypertension    • Meniere disease    • Myocardial infarction (CMS/[facility])    • Tremor         Past Surgical History:   Procedure Laterality Date   • ABLATION OF DYSRHYTHMIC FOCUS     • CATARACT EXTRACTION, BILATERAL     • CORONARY ANGIOPLASTY WITH STENT PLACEMENT     • CORONARY ARTERY BYPASS BRING BACK FOR EXPLORATION     • HERNIA REPAIR     • INNER EAR SURGERY     • PACEMAKER IMPLANTATION         Patient History     Row Name 21 4505             History    Chief Complaint  Ulcer, wound or other skin conditions;Swelling  -      Brief Description of Current Complaint  Pt with known BLE venous stasis, dx of \"leaky valve\" in the RLE with instructions from MD to make lifestyle changes to accommodate venous stasis rather than pursue surgical treatment. Pt with recent h/o large blister to the anterior RLE, now with large blister to the posterior RLE that was debrided in the ED. Pt prescribed keflex at that time, but has not picked up that Rx yet (dtr reports she will today)  -      Previous treatment for THIS PROBLEM  Medication  -      Patient/Caregiver Goals  Heal wound;Decrease swelling  -      Patient's Rating of General Health  Fair  -      Occupation/sports/leisure activities  Retired  -      Patient seeing anyone else for " problem(s)?  No  -MC      How has patient tried to help current problem?  Pt has been getting  services, who change his unna boots once/week.  -      Related/Recent Hospitalizations  No  -         Pain     Pain Location  Leg  -      Pain at Present  0  -         Services    Are you currently receiving Home Health services  Yes  -MC      What agency are you receiving Home Health services  [facility]  -      Do you plan to receive Home Health services in the near future  No Dtr called [facility] during his appt to request d/c.  -         Daily Activities    Primary Language  English  -      How does patient learn best?  Listening;Demonstration  -      Teaching needs identified  Management of Condition  -      Patient is concerned about/has problems with  Other (comment);Difficulty with self care (i.e. bathing, dressing, toileting:;Transfers (getting out of a chair, bed);Walking wound, edema management  -      Pt Participated in POC and Goals  Yes  -         Safety    Are you being hurt, hit, or frightened by anyone at home or in your life?  No  -      Are you being neglected by a caregiver  No  -        User Key  (r) = Recorded By, (t) = Taken By, (c) = Cosigned By    Initials Name Provider Type    Lizz Byrd PT Physical Therapist          EVALUATION  PT Ortho     Row Name 02/19/21 1430       Transfers    Sit-Stand [place] (Transfers)  contact guard  -    Stand-Sit [place] (Transfers)  contact guard  -    Comment (Transfers)  seated for tx  -       Gait/Stairs (Locomotion)    [place] Level (Gait)  supervision  -    Assistive Device (Gait)  cane, straight  -      User Key  (r) = Recorded By, (t) = Taken By, (c) = Cosigned By    Initials Name Provider Type    Lizz Byrd PT Physical Therapist        LDA Wound     Row Name 02/19/21 1430             Wound 02/19/21 1430 Right posterior leg Blisters    Wound - Properties Group Placement Date:  02/19/21  - Placement Time: 1430  - Present on Hospital Admission: Y  - Side: Right  - Orientation: posterior  - Location: leg  - Primary Wound Type: Blisters  -    Wound Image  Images linked: 2  -      Dressing Appearance  intact;moist drainage;copious drainage  -      Base  moist;red;pink;yellow;slough  -      Periwound  intact;dry;redness;swelling  -      Periwound Temperature  warm  -      Periwound Skin Turgor  soft  -      Edges  irregular;open  -      Wound Length (cm)  12 cm  -      Wound Width (cm)  13.6 cm  -      Wound Depth (cm)  0.1 cm  -      Drainage Characteristics/Odor  serosanguineous  -      Drainage Amount  large  -      Care, Wound  cleansed with;wound cleanser;debrided;aditi boot  -      Dressing Care  dressing applied;silver impregnated;low-adherent;foam mepilex Ag, unna boot, qwick/optilock  -      Periwound Care  cleansed with pH balanced cleanser;barrier ointment applied zguard  -      Retired Wound - Properties Group Date first assessed: 02/19/21  - Time first assessed: 1430  - Present on Hospital Admission: Y  - Side: Right  - Location: leg  - Primary Wound Type: Blisters  -      User Key  (r) = Recorded By, (t) = Taken By, (c) = Cosigned By    Initials Name Provider Type     Lizz Menon, PT Physical Therapist        Lymphedema     Row Name 02/19/21 1430             Lymphedema Edema Assessment    Ptting Edema Category  By severity  -      Pitting Edema  Moderate  -         Skin Changes/Observations    Location/Assessment  Lower Extremity  -      Lower Extremity Conditions  left:;intact;bilateral:;clean;dry;shiny;hairless;right:;inflamed  -      Lower Extremity Color/Pigment  bilateral:;red;blanchable;hyperpigmented;brawny  -         Lymphedema Pulses/Capillary Refill    Lymphedema Pulses/Capillary Refill  lower extremity pulses;capillary refill  -      Dorsalis Pedis Pulse  right:;left:;+1 diminished  -       "Posterior Tibialis Pulse  right:;left:;+1 diminished  -MC      Capillary Refill  lower extremity capillary refill  -MC      Lower Extremity Capillary Refill  right:;left:;less than 3 seconds  -MC         Compression/Skin Care    Compression/Skin Care  skin care;wrapping location;bandaging;compression garment  -      Skin Care  washed/dried  -MC      Wrapping Location  lower extremity  -MC      Wrapping Location LE  bilateral:;foot to knee  -MC      Wrapping Comments  RLE: mepilex Ag x2, unna boot, qwick, optilock, cast padding, 4\" coban, size 6 spandage. LLE: size 4/5 compressogrip doubled and overlapping to create gradient compression  -      Bandage Layers  padding/fluff layer;cotton elastic stocking- double layer (comment size)  -      Bandaging Technique  figure-eight;light compression  -        User Key  (r) = Recorded By, (t) = Taken By, (c) = Cosigned By    Initials Name Provider Type    Lizz Byrd, PT Physical Therapist          WOUND DEBRIDEMENT  Total area of Debridement: 3 cm2  Debridement Site 1  Location- Site 1: RLE  Selective Debridement- Site 1: Wound Surface <20cmsq  Instruments- Site 1: tweezers  Excised Tissue Description- Site 1: minimum, slough, other (comment)(blistered nonviable skin)  Bleeding- Site 1: none             Therapy Education     Row Name 02/19/21 9900             Therapy Education    Education Details  Reviewed s/sx of infection and PT role in wound care. Discussed that pt is unable to receive OP PT wound care and  skilled nursing services at the same time, and needs to request d/c from HH if he wishes to continue here. Keep dressings C/D/I until next treatment. Elevate legs as often as able.  -MC      Given  Symptoms/condition management;Bandaging/dressing change  -MC      Program  New  -MC      How Provided  Verbal;Demonstration  -MC      Provided to  Patient;Caregiver Dtr  -MC      Level of Understanding  Teach back education performed;Verbalized  -MC      "   User Key  (r) = Recorded By, (t) = Taken By, (c) = Cosigned By    Initials Name Provider Type     Lizz Menon, PT Physical Therapist          Recommendation and Plan  PT Assessment/Plan     Row Name 21 4593          PT Assessment    Functional Limitations  Decreased safety during functional activities;Impaired gait;Performance in self-care ADL;Other (comment);Limitations in functional capacity and performance wound, edema management  -     Impairments  Integumentary integrity;Edema  -     Assessment Comments  Pt presents with evolving s/sx of infection to the RLE consistent with cellulitis or venous stasis dermatitis. Pt with very large open area s/p debridement of blistered skin in the ED. The base is moist and red, with some adherent slough in circular pattern from nonstick dressing. Pt with large amounts of serosanguinous drainage from the RLE that will require specialized treatment to manage. Pt with moderate, pitting BLE edema that will benefit from compression therapy to promote venous return and maintain limb girth reduction. Pt will benefit from skilled PT wound care to promote healing.  -     Rehab Potential  Good  -     Patient/caregiver participated in establishment of treatment plan and goals  Yes  -     Patient would benefit from skilled therapy intervention  Yes  -        PT Plan    PT Frequency  3x/week  -     Predicted Duration of Therapy Intervention (PT)  24 visits  -     Planned CPT's?  PT EVAL MOD COMPLELITY: 68173;PT SELF CARE/MGMT/TRAIN 15 MIN: 89045;PT NONSELECT DEBRIDE 15 MIN: 82529;PT MODE DEBRIDE OPEN WOUND UP TO 20 CM: 06451;PT MODE DEBRIDE OPEN WOUND EA ADD 20 CM: 29251;PT NLFU MIST: 27418;PT UNNA BOOT: 45191;PT MULTI LAYER COMP SYS LE;PT THER SUPP EA 15 MIN  -     Physical Therapy Interventions (Optional Details)  wound care;patient/family education  -     PT Plan Comments  debridement prn, UB vs MLW, dressings  -       User Key  (r) =  Recorded By, (t) = Taken By, (c) = Cosigned By    Initials Name Provider Type    Lizz Byrd, PT Physical Therapist            Goals  PT OP Goals     Row Name 02/19/21 1430          PT Short Term Goals    STG 1  Pt will verbalize s/sx of infection.  -     STG 2  Pt will demonstrate 25% reduction in wound area to indicate healing progress.  -        Long Term Goals    LTG 1  Pt/caregiver will demonstrate independence with clean home dressing changes.  -     LTG 2  Pt will demonstrate 75% reduction in wound area to indicate healing progress.  -     LTG 3  Pt will demonstrate only mild/trace BLE edema to indicate appropriate edema management.  -        Time Calculation    PT Goal Re-Cert Due Date  05/20/21  -       User Key  (r) = Recorded By, (t) = Taken By, (c) = Cosigned By    Initials Name Provider Type    Lizz Byrd PT Physical Therapist          Time Calculation: Start Time: 1430  Therapy Charges for Today     Code Description Service Date Service Provider Modifiers Qty    32493352477 HC PT EVAL MOD COMPLEXITY 4 2/19/2021 Lizz Menon, PT GP 1                Lizz Menon, PT  2/19/2021

## 2021-02-22 ENCOUNTER — HOSPITAL ENCOUNTER (OUTPATIENT)
Dept: PHYSICAL THERAPY | Facility: HOSPITAL | Age: 86
Setting detail: THERAPIES SERIES
Discharge: HOME | End: 2021-02-22

## 2021-02-22 DIAGNOSIS — S81.801D OPEN WOUND OF RIGHT LOWER EXTREMITY, SUBSEQUENT ENCOUNTER: Primary | ICD-10-CM

## 2021-02-22 DIAGNOSIS — I87.8 VENOUS STASIS OF BOTH LOWER EXTREMITIES: ICD-10-CM

## 2021-02-22 DIAGNOSIS — R60.0 BILATERAL LOWER EXTREMITY EDEMA: ICD-10-CM

## 2021-02-22 PROCEDURE — 29581 APPL MULTLAYER CMPRN SYS LEG: CPT

## 2021-02-22 PROCEDURE — 97597 DBRDMT OPN WND 1ST 20 CM/<: CPT

## 2021-02-22 PROCEDURE — 29580 STRAPPING UNNA BOOT: CPT

## 2021-02-22 NOTE — THERAPY WOUND CARE TREATMENT
Outpatient Rehabilitation - Wound/Debridement Treatment Note  [facility]     Patient Name: Jesus Montiel Jr.  : 1934  MRN: [id number]  Today's Date: 2021                 Admit Date: 2021    Visit Dx:    ICD-10-CM ICD-9-CM   1. Open wound of right lower extremity, subsequent encounter  S81.801D V58.89     891.0   2. Venous stasis of both lower extremities  I87.8 459.81   3. Bilateral lower extremity edema  R60.0 782.3       Patient Active Problem List   Diagnosis   • Cellulitis of right leg   • Failure of outpatient treatment   • Hypertension   • Hyperlipidemia   • A-fib (CMS/[facility])        Past Medical History:   Diagnosis Date   • Hyperlipidemia    • Hypertension    • Meniere disease    • Myocardial infarction (CMS/[facility])    • Tremor         Past Surgical History:   Procedure Laterality Date   • ABLATION OF DYSRHYTHMIC FOCUS     • CATARACT EXTRACTION, BILATERAL     • CORONARY ANGIOPLASTY WITH STENT PLACEMENT     • CORONARY ARTERY BYPASS BRING BACK FOR EXPLORATION     • HERNIA REPAIR     • INNER EAR SURGERY     • PACEMAKER IMPLANTATION           EVALUATION  PT Ortho     Row Name 21 8092       Subjective Comments    Subjective Comments  No complaints. Reports he thinks his leg feels better since last time.  -       Subjective Pain    Able to rate subjective pain?  yes  -    Pre-Treatment Pain Level  2  -    Post-Treatment Pain Level  6  -MC    Subjective Pain Comment  increased with debridement of RLE  -       Transfers    Sit-Stand [place] (Transfers)  contact guard  -    Stand-Sit [place] (Transfers)  standby assist  -    Comment (Transfers)  seated for tx  -       Gait/Stairs (Locomotion)    [place] Level (Gait)  supervision  -    Assistive Device (Gait)  cane, straight  -      User Key  (r) = Recorded By, (t) = Taken By, (c) = Cosigned By    Initials Name Provider Type    Lizz Byrd, PT Physical Therapist          LDA Wound     Row Name 21  1355             Wound 02/19/21 1430 Right posterior leg Blisters    Wound - Properties Group Placement Date: 02/19/21  - Placement Time: 1430  - Present on Hospital Admission: Y  - Side: Right  - Orientation: posterior  - Location: leg  - Primary Wound Type: Blisters  -    Dressing Appearance  intact;copious drainage  -      Base  moist;red;pink;yellow;slough  -      Periwound  intact;dry;redness;swelling  -      Periwound Temperature  warm  -      Periwound Skin Turgor  soft  -      Edges  irregular;open  -      Drainage Characteristics/Odor  serosanguineous  -      Drainage Amount  large  -      Care, Wound  cleansed with;wound cleanser;debrided  -      Dressing Care  dressing applied;silver impregnated;collagen;gauze, antimicrobial margarita, sorbact, unna boot, absorptive layers  -      Periwound Care  cleansed with pH balanced cleanser;barrier ointment applied zguard  -      Retired Wound - Properties Group Date first assessed: 02/19/21  - Time first assessed: 1430  - Present on Hospital Admission: Y  - Side: Right  - Location: leg  - Primary Wound Type: Blisters  -MC      User Key  (r) = Recorded By, (t) = Taken By, (c) = Cosigned By    Initials Name Provider Type    Lizz Byrd PT Physical Therapist        Lymphedema     Row Name 02/22/21 1355             Lymphedema Edema Assessment    Ptting Edema Category  By severity  -      Pitting Edema  Mild;Moderate mild RLE, mod LLE  -         Skin Changes/Observations    Lower Extremity Conditions  left:;intact;bilateral:;clean;dry;shiny;hairless;right:;inflamed  -      Lower Extremity Color/Pigment  bilateral:;red;blanchable;hyperpigmented;brawny  -         Lymphedema Pulses/Capillary Refill    Lower Extremity Capillary Refill  right:;left:;less than 3 seconds  -         Compression/Skin Care    Compression/Skin Care  skin care;wrapping location;bandaging  -      Skin Care  washed/dried  -       Wrapping Location  lower extremity  -MC      Wrapping Location LE  bilateral:;foot to knee  -MC      Wrapping Comments  RLE: margarita, sorbact, unna boot, qwick x2, optilock x1, cast padding, coban, size 5 spandage. LLE: MLW with size 3.5/4 compressogrip doubled and overlapping to create gradient compression.  -MC      Bandage Layers  padding/fluff layer;cotton elastic stocking- double layer (comment size)  -MC      Bandaging Technique  figure-eight;light compression  -MC        User Key  (r) = Recorded By, (t) = Taken By, (c) = Cosigned By    Initials Name Provider Type    Lizz Byrd, PT Physical Therapist          WOUND DEBRIDEMENT  Total area of Debridement: 8 cm2  Debridement Site 1  Location- Site 1: RLE  Selective Debridement- Site 1: Wound Surface <20cmsq  Instruments- Site 1: tweezers  Excised Tissue Description- Site 1: moderate, other (comment)(biofilm)  Bleeding- Site 1: none             Therapy Education     Row Name 02/22/21 5534             Therapy Education    Education Details  Explained that margarita may cause discomfort, but it is usually temporary. Please let PT know if it is intolerable. Elevate legs when able.  -MC      Given  Symptoms/condition management;Bandaging/dressing change  -MC      Program  Reinforced;Modified  -MC      How Provided  Verbal;Demonstration  -MC      Provided to  Patient;Caregiver  -MC      Level of Understanding  Teach back education performed;Verbalized  -        User Key  (r) = Recorded By, (t) = Taken By, (c) = Cosigned By    Initials Name Provider Type    Lizz Byrd, PT Physical Therapist          Recommendation and Plan  PT Assessment/Plan     Row Name 02/22/21 6349          PT Assessment    Functional Limitations  Decreased safety during functional activities;Impaired gait;Performance in self-care ADL;Other (comment);Limitations in functional capacity and performance wound, edema management  -MC     Impairments  Integumentary integrity;Edema   -     Assessment Comments  Pt with improved overall edema, erythema, and wound status to the RLE. Pt with some epithelialization present at the wound edges. Mepilex Ag appears to hold too much moisture for the amount of drainage the patient has, so PT switched the dressing to margarita, cutimed sorbact, and absorptive layers to promote more efficient drainage management. Pt with improving edema to the LLE as well, with edema appropriately managed with MLW to increase venous return and maintain limb girth reduction.  -     Rehab Potential  Good  -     Patient/caregiver participated in establishment of treatment plan and goals  Yes  -     Patient would benefit from skilled therapy intervention  Yes  -        PT Plan    PT Frequency  3x/week  -     Physical Therapy Interventions (Optional Details)  wound care;patient/family education  -     PT Plan Comments  debridement, UB vs MLW, dressings  -       User Key  (r) = Recorded By, (t) = Taken By, (c) = Cosigned By    Initials Name Provider Type    Lizz Byrd, PT Physical Therapist          Goals  PT OP Goals     Row Name 02/22/21 1355          Time Calculation    PT Goal Re-Cert Due Date  05/20/21  -       User Key  (r) = Recorded By, (t) = Taken By, (c) = Cosigned By    Initials Name Provider Type    Lizz Byrd, PT Physical Therapist          PT Goal Re-Cert Due Date: 05/20/21            Time Calculation: Start Time: 1355  Therapy Charges for Today     Code Description Service Date Service Provider Modifiers Qty    49998188720 HC MODE DEBRIDE OPEN WOUND UP TO 20CM 2/22/2021 Lizz Menon, PT GP 1    77593546616 HC PT MULTI LAYER COMP SYS BELOW KNEE 2/22/2021 Lizz Menon, PT GP, LT 1    37907153945 HC PT STAPPING UNNA BOOT 2/22/2021 Lizz Menon, PT GP, RT 1                  Lizz Menon PT  2/22/2021

## 2021-02-24 ENCOUNTER — HOSPITAL ENCOUNTER (OUTPATIENT)
Dept: PHYSICAL THERAPY | Facility: HOSPITAL | Age: 86
Setting detail: THERAPIES SERIES
Discharge: HOME | End: 2021-02-24

## 2021-02-24 DIAGNOSIS — S81.801D OPEN WOUND OF RIGHT LOWER EXTREMITY, SUBSEQUENT ENCOUNTER: Primary | ICD-10-CM

## 2021-02-24 DIAGNOSIS — I87.8 VENOUS STASIS OF BOTH LOWER EXTREMITIES: ICD-10-CM

## 2021-02-24 DIAGNOSIS — R60.0 BILATERAL LOWER EXTREMITY EDEMA: ICD-10-CM

## 2021-02-24 PROCEDURE — 29580 STRAPPING UNNA BOOT: CPT

## 2021-02-24 PROCEDURE — 97597 DBRDMT OPN WND 1ST 20 CM/<: CPT

## 2021-02-24 PROCEDURE — 29581 APPL MULTLAYER CMPRN SYS LEG: CPT

## 2021-02-24 NOTE — THERAPY WOUND CARE TREATMENT
Outpatient Rehabilitation - Wound/Debridement Treatment Note  [facility]     Patient Name: Jesus Montiel Jr.  : 1934  MRN: [id number]  Today's Date: 2021                 Admit Date: 2021    Visit Dx:    ICD-10-CM ICD-9-CM   1. Open wound of right lower extremity, subsequent encounter  S81.801D V58.89     891.0   2. Venous stasis of both lower extremities  I87.8 459.81   3. Bilateral lower extremity edema  R60.0 782.3       Patient Active Problem List   Diagnosis   • Cellulitis of right leg   • Failure of outpatient treatment   • Hypertension   • Hyperlipidemia   • A-fib (CMS/HCC)        Past Medical History:   Diagnosis Date   • Hyperlipidemia    • Hypertension    • Meniere disease    • Myocardial infarction (CMS/HCC)    • Tremor         Past Surgical History:   Procedure Laterality Date   • ABLATION OF DYSRHYTHMIC FOCUS     • CATARACT EXTRACTION, BILATERAL     • CORONARY ANGIOPLASTY WITH STENT PLACEMENT     • CORONARY ARTERY BYPASS BRING BACK FOR EXPLORATION     • HERNIA REPAIR     • INNER EAR SURGERY     • PACEMAKER IMPLANTATION           EVALUATION  PT Ortho     Row Name 21       Subjective Comments    Subjective Comments  No complaints or changes since last treatment. Reports no pain prior to treatment.  -MC       Subjective Pain    Able to rate subjective pain?  yes  -MC    Pre-Treatment Pain Level  0  -MC    Post-Treatment Pain Level  2  -MC    Subjective Pain Comment  increased with debridement  -MC       Transfers    Comment (Transfers)  remained seated in transport chair for tx  -      User Key  (r) = Recorded By, (t) = Taken By, (c) = Cosigned By    Initials Name Provider Type    Lizz Byrd, PT Physical Therapist          [facility] Wound     Row Name 21             Wound 21 1430 Right posterior leg Blisters    Wound - Properties Group Placement Date: 21  -MC Placement Time: 1430  -MC Present on Hospital Admission: Y  -MC Side: Right  -  Orientation: posterior  - Location: leg  - Primary Wound Type: Blisters  -    Dressing Appearance  intact;moist drainage;copious drainage small amount through anterior wrap at ankle  -      Base  moist;red;pink;yellow;slough still with biofilm, not as thick today  -      Periwound  intact;dry;redness;swelling  -      Periwound Temperature  warm  -      Periwound Skin Turgor  soft  -      Edges  irregular;open  -      Drainage Characteristics/Odor  serosanguineous  -      Drainage Amount  large  -      Care, Wound  cleansed with;wound cleanser;debrided;aditi boot  -      Dressing Care  dressing applied;antimicrobial agent applied;foam;other (see comments) HFBt, unna boot, qwick x2, optilock x1, cast padding  -      Periwound Care  cleansed with pH balanced cleanser;barrier ointment applied zguard  -      Retired Wound - Properties Group Date first assessed: 02/19/21  - Time first assessed: 1430  - Present on Hospital Admission: Y  - Side: Right  - Location: leg  - Primary Wound Type: Blisters  -      User Key  (r) = Recorded By, (t) = Taken By, (c) = Cosigned By    Initials Name Provider Type     Lizz Menon PT Physical Therapist        Lymphedema     Row Name 02/24/21 4501             Lymphedema Edema Assessment    Ptting Edema Category  By severity  -      Pitting Edema  Mild  -         Skin Changes/Observations    Lower Extremity Conditions  left:;intact;bilateral:;clean;dry;shiny;hairless;right:;inflamed  -      Lower Extremity Color/Pigment  bilateral:;red;blanchable;hyperpigmented;brawny  -         Lymphedema Pulses/Capillary Refill    Lower Extremity Capillary Refill  right:;left:;less than 3 seconds  -         Compression/Skin Care    Compression/Skin Care  skin care;wrapping location;bandaging  -      Skin Care  washed/dried  -      Wrapping Location  lower extremity  -      Wrapping Location LE  bilateral:;foot to knee  -      Wrapping  Comments  RLE: HFBt, unna boot, qwick x2, optilock x1, cast padding, coban, size 6 spandage. LLE: MLW with size 3.5/4 compressogrip doubled and overlapping to create gradient compression.  -MC      Bandage Layers  padding/fluff layer;cotton elastic stocking- double layer (comment size)  -MC      Bandaging Technique  figure-eight;light compression  -MC        User Key  (r) = Recorded By, (t) = Taken By, (c) = Cosigned By    Initials Name Provider Type    Lizz Byrd, PT Physical Therapist          WOUND DEBRIDEMENT  Total area of Debridement: 5 cm2  Debridement Site 1  Location- Site 1: RLE  Selective Debridement- Site 1: Wound Surface <20cmsq  Instruments- Site 1: tweezers  Excised Tissue Description- Site 1: moderate, slough, other (comment)(biofilm)  Bleeding- Site 1: none             Therapy Education     Row Name 02/24/21 09             Therapy Education    Education Details  Explained use of HFBt to promote antimicrobial wound healing environment and debridement. Continue elevating legs.  -MC      Given  Symptoms/condition management;Bandaging/dressing change  -MC      Program  Reinforced  -MC      How Provided  Verbal  -MC      Provided to  Patient;Caregiver  -MC      Level of Understanding  Teach back education performed;Verbalized  -MC        User Key  (r) = Recorded By, (t) = Taken By, (c) = Cosigned By    Initials Name Provider Type    Lizz Byrd, PT Physical Therapist          Recommendation and Plan  PT Assessment/Plan     Row Name 02/24/21 0925          PT Assessment    Functional Limitations  Decreased safety during functional activities;Impaired gait;Performance in self-care ADL;Other (comment);Limitations in functional capacity and performance wound mgmt  -MC     Impairments  Integumentary integrity;Edema  -MC     Assessment Comments  Pt with some new epithelialization to the R posterior leg wound, but still with some biofilm that requires debridement. Debridement limited by  pain, so PT switched underlying dressing to HFB to attempt to promote better autolytic debridement between treatment. BLE edema improved with use of compression therapy. Pt will continue to benefit from skilled PT wound care to promote healing.  -     Rehab Potential  Good  -     Patient/caregiver participated in establishment of treatment plan and goals  Yes  -     Patient would benefit from skilled therapy intervention  Yes  -        PT Plan    PT Frequency  3x/week  -     Physical Therapy Interventions (Optional Details)  wound care;patient/family education  -     PT Plan Comments  debridement, UB/MLW  -       User Key  (r) = Recorded By, (t) = Taken By, (c) = Cosigned By    Initials Name Provider Type     Lizz Menon, PT Physical Therapist          Goals  PT OP Goals     Row Name 02/24/21 0930          Time Calculation    PT Goal Re-Cert Due Date  05/20/21  -       User Key  (r) = Recorded By, (t) = Taken By, (c) = Cosigned By    Initials Name Provider Type     Lizz Menon, PT Physical Therapist          PT Goal Re-Cert Due Date: 05/20/21            Time Calculation: Start Time: 0930  Therapy Charges for Today     Code Description Service Date Service Provider Modifiers Qty    71972157029 HC MODE DEBRIDE OPEN WOUND UP TO 20CM 2/24/2021 Lizz Menon, PT GP 1    40284445259 HC PT MULTI LAYER COMP SYS BELOW KNEE 2/24/2021 Lizz Menon, PT GP, LT 1    55891295929 HC PT STAPPING UNNA BOOT 2/24/2021 Lizz Menon, PT GP, RT 1                  Lizz Menon PT  2/24/2021

## 2021-02-26 ENCOUNTER — HOSPITAL ENCOUNTER (OUTPATIENT)
Dept: PHYSICAL THERAPY | Facility: HOSPITAL | Age: 86
Setting detail: THERAPIES SERIES
Discharge: HOME | End: 2021-02-26

## 2021-02-26 DIAGNOSIS — I87.8 VENOUS STASIS OF BOTH LOWER EXTREMITIES: ICD-10-CM

## 2021-02-26 DIAGNOSIS — R60.0 BILATERAL LOWER EXTREMITY EDEMA: ICD-10-CM

## 2021-02-26 DIAGNOSIS — S81.801D OPEN WOUND OF RIGHT LOWER EXTREMITY, SUBSEQUENT ENCOUNTER: Primary | ICD-10-CM

## 2021-02-26 PROCEDURE — 29580 STRAPPING UNNA BOOT: CPT

## 2021-02-26 PROCEDURE — 29581 APPL MULTLAYER CMPRN SYS LEG: CPT

## 2021-02-26 NOTE — THERAPY WOUND CARE TREATMENT
Outpatient Rehabilitation - Wound/Debridement Treatment Note  [facility]     Patient Name: Jesus Montiel Jr.  : 1934  MRN: [id number]  Today's Date: 2021                 Admit Date: 2021    Visit Dx:    ICD-10-CM ICD-9-CM   1. Open wound of right lower extremity, subsequent encounter  S81.801D V58.89     891.0   2. Venous stasis of both lower extremities  I87.8 459.81   3. Bilateral lower extremity edema  R60.0 782.3       Patient Active Problem List   Diagnosis   • Cellulitis of right leg   • Failure of outpatient treatment   • Hypertension   • Hyperlipidemia   • A-fib (CMS/HCC)        Past Medical History:   Diagnosis Date   • Hyperlipidemia    • Hypertension    • Meniere disease    • Myocardial infarction (CMS/HCC)    • Tremor         Past Surgical History:   Procedure Laterality Date   • ABLATION OF DYSRHYTHMIC FOCUS     • CATARACT EXTRACTION, BILATERAL     • CORONARY ANGIOPLASTY WITH STENT PLACEMENT     • CORONARY ARTERY BYPASS BRING BACK FOR EXPLORATION     • HERNIA REPAIR     • INNER EAR SURGERY     • PACEMAKER IMPLANTATION           EVALUATION  PT Ortho     Row Name 21 9580       Subjective Comments    Subjective Comments  No complaints or changes.  in the RLE.  -       Subjective Pain    Able to rate subjective pain?  yes  -    Pre-Treatment Pain Level  2  -    Post-Treatment Pain Level  4  -    Subjective Pain Comment  increased with dressings removal  -       Transfers    Sit-Stand [place] (Transfers)  standby assist  -    Stand-Sit [place] (Transfers)  standby assist  -    Comment (Transfers)  seated for tx  -       Gait/Stairs (Locomotion)    [place] Level (Gait)  standby assist  -    Assistive Device (Gait)  cane, straight  -      User Key  (r) = Recorded By, (t) = Taken By, (c) = Cosigned By    Initials Name Provider Type    Lizz Byrd, PT Physical Therapist          [facility] Wound     Row Name 21 1777              Wound 02/19/21 1430 Right posterior leg Blisters    Wound - Properties Group Placement Date: 02/19/21  - Placement Time: 1430  - Present on Hospital Admission: Y  - Side: Right  - Orientation: posterior  - Location: leg  - Primary Wound Type: Blisters  -MC    Dressing Appearance  intact;moist drainage through all layers at anterior ankle  -      Base  moist;red;pink;yellow;slough min biofilm in proximal portion  -      Periwound  intact;dry;redness;swelling  -      Periwound Temperature  warm  -      Periwound Skin Turgor  soft  -      Edges  irregular;open  -      Drainage Characteristics/Odor  serosanguineous;bleeding controlled  -      Drainage Amount  moderate  -      Care, Wound  cleansed with;wound cleanser;aditi boot  -      Dressing Care  dressing applied;gauze, antimicrobial;other (see comments) sorbact, qwick, unna boot, optilock, cast padding, coban  -      Periwound Care  cleansed with pH balanced cleanser;barrier ointment applied zguard  -      Retired Wound - Properties Group Date first assessed: 02/19/21  - Time first assessed: 1430  - Present on Hospital Admission: Y  - Side: Right  - Location: leg  - Primary Wound Type: Blisters  -      User Key  (r) = Recorded By, (t) = Taken By, (c) = Cosigned By    Initials Name Provider Type     Lizz Menon, PT Physical Therapist        Lymphedema     Row Name 02/26/21 0930             Lymphedema Edema Assessment    Ptting Edema Category  By severity  -      Pitting Edema  Mild  -         Skin Changes/Observations    Lower Extremity Conditions  left:;intact;bilateral:;clean;dry;shiny;hairless;right:;inflamed  -      Lower Extremity Color/Pigment  bilateral:;red;blanchable;hyperpigmented;brawny  -         Lymphedema Pulses/Capillary Refill    Lower Extremity Capillary Refill  right:;left:;less than 3 seconds  -         Compression/Skin Care    Compression/Skin Care  skin care;wrapping  location;bandaging  -MC      Skin Care  washed/dried  -MC      Wrapping Location  lower extremity  -MC      Wrapping Location LE  bilateral:;foot to knee  -MC      Wrapping Comments  RLE: sorbact, qwick, unna boot, optilock, abd pad anterior ankle, cast padding, coban, spandage. LLE: size 3.5/4 compressogrip doubled and overlapping to create gradient compression  -MC      Bandage Layers  padding/fluff layer;cotton elastic stocking- double layer (comment size)  -MC      Bandaging Technique  figure-eight;light compression  -MC        User Key  (r) = Recorded By, (t) = Taken By, (c) = Cosigned By    Initials Name Provider Type    Lizz Byrd PT Physical Therapist          WOUND DEBRIDEMENT                   Therapy Education     Row Name 02/26/21 0930             Therapy Education    Education Details  Continue with leg elevation as able.  -MC      Given  Symptoms/condition management;Bandaging/dressing change  -MC      Program  Reinforced  -MC      How Provided  Verbal  -MC      Provided to  Patient;Caregiver  -MC      Level of Understanding  Teach back education performed;Verbalized  -MC        User Key  (r) = Recorded By, (t) = Taken By, (c) = Cosigned By    Initials Name Provider Type    Lizz Byrd PT Physical Therapist          Recommendation and Plan  PT Assessment/Plan     Row Name 02/26/21 3836          PT Assessment    Functional Limitations  Decreased safety during functional activities;Impaired gait;Performance in self-care ADL;Other (comment);Limitations in functional capacity and performance wound mgmt  -MC     Impairments  Integumentary integrity;Edema  -MC     Assessment Comments  Pt with notable improvement to the RLE wound, with only a small portion of the proximal wound with biofilm/slough at this time. Pt with increased pain with HFBt removal, so PT switched to sorbact to maintain the clean wound bed and lessen adherence. Pt still with notable drainage from the RLE that  requires multiple absorbent layers. LLE edema appears well-managed with MLW at this time. Pt will continue to benefit from skilled PT wound care to promote healing.  -     Rehab Potential  Good  -     Patient/caregiver participated in establishment of treatment plan and goals  Yes  -     Patient would benefit from skilled therapy intervention  Yes  -        PT Plan    PT Frequency  3x/week  -     Physical Therapy Interventions (Optional Details)  wound care;patient/family education  -     PT Plan Comments  debridement prn, UB v MLW  -       User Key  (r) = Recorded By, (t) = Taken By, (c) = Cosigned By    Initials Name Provider Type    Lizz Byrd, PT Physical Therapist          Goals  PT OP Goals     Row Name 02/26/21 0930          Time Calculation    PT Goal Re-Cert Due Date  05/20/21  -       User Key  (r) = Recorded By, (t) = Taken By, (c) = Cosigned By    Initials Name Provider Type    Lizz Byrd, PT Physical Therapist          PT Goal Re-Cert Due Date: 05/20/21            Time Calculation: Start Time: 0930  Therapy Charges for Today     Code Description Service Date Service Provider Modifiers Qty    10954641290 HC PT MULTI LAYER COMP SYS BELOW KNEE 2/26/2021 Lizz Menon, PT GP, LT 1    77978622883 HC PT STAPPING UNNA BOOT 2/26/2021 Lizz Menon, PT GP, RT 1                  Lizz Menon, PT  2/26/2021

## 2021-03-01 ENCOUNTER — HOSPITAL ENCOUNTER (OUTPATIENT)
Dept: PHYSICAL THERAPY | Facility: HOSPITAL | Age: 86
Setting detail: THERAPIES SERIES
Discharge: HOME | End: 2021-03-01

## 2021-03-01 DIAGNOSIS — R60.0 BILATERAL LOWER EXTREMITY EDEMA: ICD-10-CM

## 2021-03-01 DIAGNOSIS — S81.801D OPEN WOUND OF RIGHT LOWER EXTREMITY, SUBSEQUENT ENCOUNTER: Primary | ICD-10-CM

## 2021-03-01 DIAGNOSIS — I87.8 VENOUS STASIS OF BOTH LOWER EXTREMITIES: ICD-10-CM

## 2021-03-01 PROCEDURE — 97597 DBRDMT OPN WND 1ST 20 CM/<: CPT

## 2021-03-01 PROCEDURE — 29580 STRAPPING UNNA BOOT: CPT

## 2021-03-01 PROCEDURE — 29581 APPL MULTLAYER CMPRN SYS LEG: CPT

## 2021-03-01 NOTE — THERAPY WOUND CARE TREATMENT
Outpatient Rehabilitation - Wound/Debridement Treatment Note  [facility]     Patient Name: Jesus Montiel Jr.  : 1934  MRN: [id number]  Today's Date: 3/1/2021                 Admit Date: 3/1/2021    Visit Dx:    ICD-10-CM ICD-9-CM   1. Open wound of right lower extremity, subsequent encounter  S81.801D V58.89     891.0   2. Venous stasis of both lower extremities  I87.8 459.81   3. Bilateral lower extremity edema  R60.0 782.3   RLE wound (medial view):    RLE wound (lateral view):    Patient Active Problem List   Diagnosis   • Cellulitis of right leg   • Failure of outpatient treatment   • Hypertension   • Hyperlipidemia   • A-fib (CMS/HCC)        Past Medical History:   Diagnosis Date   • Hyperlipidemia    • Hypertension    • Meniere disease    • Myocardial infarction (CMS/HCC)    • Tremor         Past Surgical History:   Procedure Laterality Date   • ABLATION OF DYSRHYTHMIC FOCUS     • CATARACT EXTRACTION, BILATERAL     • CORONARY ANGIOPLASTY WITH STENT PLACEMENT     • CORONARY ARTERY BYPASS BRING BACK FOR EXPLORATION     • HERNIA REPAIR     • INNER EAR SURGERY     • PACEMAKER IMPLANTATION           EVALUATION  PT Ortho     Row Name 21 [id number]       Subjective Comments    Subjective Comments  No complaints, still having occasional pain in RLE but less frequent.  States R great toe is a little red around the toenail, has podiatrist appt next week.  -JM       Subjective Pain    Able to rate subjective pain?  yes  -JM    Pre-Treatment Pain Level  2  -JM    Post-Treatment Pain Level  4  -JM    Subjective Pain Comment  increased pain with removal of slightly adherent dressing RLE  -JM       Transfers    Sit-Stand [place] (Transfers)  standby assist  -JM    Stand-Sit [place] (Transfers)  standby assist  -JM    Comment (Transfers)  seated for tx  -JM       Gait/Stairs (Locomotion)    [place] Level (Gait)  standby assist  -    Assistive Device (Gait)  cane, straight  -      User Key  (r)  = Recorded By, (t) = Taken By, (c) = Cosigned By    Initials Name Provider Type    Sandy Altman, PT Physical Therapist          [facility] Wound     Row Name 03/01/21 1345             Wound 02/19/21 1430 Right posterior leg Blisters    Wound - Properties Group Placement Date: 02/19/21  - Placement Time: 1430  - Present on Hospital Admission: Y  - Side: Right  - Orientation: posterior  - Location: leg  - Primary Wound Type: Blisters  -MC    Wound Image  Images linked: 2  -JM      Dressing Appearance  intact;moist drainage qwick mildly adherent  -      Base  moist;red;pink;yellow;slough;epithelialization skin bud formation inferior aspects  -      Periwound  intact;dry;redness;swelling  -      Periwound Temperature  warm  -      Periwound Skin Turgor  soft  -      Edges  irregular;open  -      Wound Length (cm)  10 cm  -      Wound Width (cm)  12 cm  -      Wound Depth (cm)  0.1 cm  -      Drainage Characteristics/Odor  serosanguineous;sanguineous  -      Drainage Amount  moderate  -      Care, Wound  cleansed with;wound cleanser;debrided;aditi boot  -      Dressing Care  dressing applied;antimicrobial agent applied sorbact, unna boot, qwick/optilock, CP, coban  -      Periwound Care  cleansed with pH balanced cleanser;dry periwound area maintained;barrier ointment applied z-guard  -      Retired Wound - Properties Group Date first assessed: 02/19/21  - Time first assessed: 1430  - Present on Hospital Admission: Y  - Side: Right  - Location: leg  - Primary Wound Type: Blisters  -MC      User Key  (r) = Recorded By, (t) = Taken By, (c) = Cosigned By    Initials Name Provider Type    Lizz Byrd, PT Physical Therapist    Sandy Altman, PT Physical Therapist        Lymphedema     Row Name 03/01/21 1345             Lymphedema Edema Assessment    Ptting Edema Category  By severity  -      Pitting Edema  Mild  -         Skin Changes/Observations     Lower Extremity Conditions  left:;intact;bilateral:;clean;dry;shiny;hairless;right:;inflamed  -      Lower Extremity Color/Pigment  bilateral:;red;blanchable;hyperpigmented;brawny  -         Lymphedema Pulses/Capillary Refill    Lower Extremity Capillary Refill  right:;left:;less than 3 seconds  -         Compression/Skin Care    Compression/Skin Care  skin care;wrapping location;bandaging  -      Skin Care  washed/dried  -      Wrapping Location  lower extremity  -JM      Wrapping Location LE  bilateral:;foot to knee  -      Wrapping Comments  RLE: sorbact, unna boot, qwick, optilock, abd pad anterior ankle, cast padding, coban, spandage. LLE: size 3.5/4 compressogrip doubled and overlapping to create gradient compression  -      Bandage Layers  padding/fluff layer;cotton elastic stocking- double layer (comment size)  -      Bandaging Technique  figure-eight;light compression  -        User Key  (r) = Recorded By, (t) = Taken By, (c) = Cosigned By    Initials Name Provider Type    Sandy Altman, PT Physical Therapist          WOUND DEBRIDEMENT  Total area of Debridement: 8cmsq  Debridement Site 1  Location- Site 1: RLE  Selective Debridement- Site 1: Wound Surface <20cmsq  Instruments- Site 1: tweezers  Excised Tissue Description- Site 1: scant, slough, minimum, other (comment)(loose periwound crusts)  Bleeding- Site 1: scant             Therapy Education     Row Name 03/01/21 1906             Therapy Education    Education Details  Discussed compression options with pt and daughter (compressogrips vs velcro-closure system).  Issued info on compreflex lite velcro wraps for dtr to research for pricing/options.  -DIANE      Given  Symptoms/condition management;Bandaging/dressing change  -DIANE      Program  Reinforced  -DIANE      How Provided  Verbal;Written  -DIANE      Provided to  Patient;Caregiver  -DIANE      Level of Understanding  Teach back education performed;Verbalized  -DIANE        User Key  (r)  = Recorded By, (t) = Taken By, (c) = Cosigned By    Initials Name Provider Type    Sandy Altman, PT Physical Therapist          Recommendation and Plan  PT Assessment/Plan     Row Name 03/01/21 1345          PT Assessment    Functional Limitations  Decreased safety during functional activities;Impaired gait;Performance in self-care ADL;Other (comment);Limitations in functional capacity and performance wound mgmt  -     Impairments  Integumentary integrity;Edema  -     Assessment Comments  RLE wound still with moderate drainage.  Qwick pad mildly adherent and painful to remove today, so PT applied qwick after unna boot layer to reduce adherence.  RLE wound with skin buds/islands forming inferiorly.  Pt continues to benefit from Unna boot for RLE and MLW to LLE for increased venous return.  Provided education on long-term options for edema management to pt/daughter to research options for when wound is closed.  -        PT Plan    PT Frequency  3x/week  -     Physical Therapy Interventions (Optional Details)  patient/family education;wound care  -     PT Plan Comments  debridement, unna boot/MLW  -       User Key  (r) = Recorded By, (t) = Taken By, (c) = Cosigned By    Initials Name Provider Type    Sandy Altman PT Physical Therapist          Goals  PT OP Goals     Row Name 03/01/21 1345          Time Calculation    PT Goal Re-Cert Due Date  05/20/21  -       User Key  (r) = Recorded By, (t) = Taken By, (c) = Cosigned By    Initials Name Provider Type    Sandy Altman, PT Physical Therapist          PT Goal Re-Cert Due Date: 05/20/21            Time Calculation: Start Time: 1345  Therapy Charges for Today     Code Description Service Date Service Provider Modifiers Qty    76500962195 HC MODE DEBRIDE OPEN WOUND UP TO 20CM 3/1/2021 Sandy Stokes, PT GP 1    57728629056 HC PT STAPPING UNNA BOOT 3/1/2021 Sandy Stokes, PT GP 1    11731507584 HC PT MULTI LAYER COMP SYS  BELOW KNEE 3/1/2021 Sandy Stokes, PT GP 1                  Sandy Stokes, PT  3/1/2021

## 2021-03-03 ENCOUNTER — HOSPITAL ENCOUNTER (OUTPATIENT)
Dept: PHYSICAL THERAPY | Facility: HOSPITAL | Age: 86
Setting detail: THERAPIES SERIES
Discharge: HOME | End: 2021-03-03

## 2021-03-03 DIAGNOSIS — R60.0 BILATERAL LOWER EXTREMITY EDEMA: ICD-10-CM

## 2021-03-03 DIAGNOSIS — I87.8 VENOUS STASIS OF BOTH LOWER EXTREMITIES: ICD-10-CM

## 2021-03-03 DIAGNOSIS — S81.801D OPEN WOUND OF RIGHT LOWER EXTREMITY, SUBSEQUENT ENCOUNTER: Primary | ICD-10-CM

## 2021-03-03 PROCEDURE — 29580 STRAPPING UNNA BOOT: CPT

## 2021-03-03 PROCEDURE — 29581 APPL MULTLAYER CMPRN SYS LEG: CPT

## 2021-03-04 NOTE — THERAPY WOUND CARE TREATMENT
Outpatient Rehabilitation - Wound/Debridement Treatment Note  [facility]     Patient Name: Jesus Montiel Jr.  : 1934  MRN: [id number]  Today's Date: 3/4/2021                 Admit Date: 3/3/2021    Visit Dx:    ICD-10-CM ICD-9-CM   1. Open wound of right lower extremity, subsequent encounter  S81.801D V58.89     891.0   2. Venous stasis of both lower extremities  I87.8 459.81   3. Bilateral lower extremity edema  R60.0 782.3       Patient Active Problem List   Diagnosis   • Cellulitis of right leg   • Failure of outpatient treatment   • Hypertension   • Hyperlipidemia   • A-fib (CMS/HCC)        Past Medical History:   Diagnosis Date   • Hyperlipidemia    • Hypertension    • Meniere disease    • Myocardial infarction (CMS/HCC)    • Tremor         Past Surgical History:   Procedure Laterality Date   • ABLATION OF DYSRHYTHMIC FOCUS     • CATARACT EXTRACTION, BILATERAL     • CORONARY ANGIOPLASTY WITH STENT PLACEMENT     • CORONARY ARTERY BYPASS BRING BACK FOR EXPLORATION     • HERNIA REPAIR     • INNER EAR SURGERY     • PACEMAKER IMPLANTATION           EVALUATION  PT Ortho     Row Name 21 [id number]       Subjective Comments    Subjective Comments  No complaints or changes. Croc shoes slightly uncomfortable, but improved with extra cushioning placed in sole.  -       Subjective Pain    Able to rate subjective pain?  yes  -    Pre-Treatment Pain Level  2  -    Post-Treatment Pain Level  3  -MC       Transfers    Sit-Stand [place] (Transfers)  standby assist  -    Stand-Sit [place] (Transfers)  standby assist  -    Transfers, Sit-Stand-Sit, Assist Device  rolling walker  -    Comment (Transfers)  seated for tx  -       Gait/Stairs (Locomotion)    [place] Level (Gait)  supervision  -    Assistive Device (Gait)  walker, front-wheeled  -      User Key  (r) = Recorded By, (t) = Taken By, (c) = Cosigned By    Initials Name Provider Type    Lizz Byrd, PT Physical  Therapist          LDA Wound     Row Name 03/03/21 1515             Wound 02/19/21 1430 Right posterior leg Blisters    Wound - Properties Group Placement Date: 02/19/21  - Placement Time: 1430  - Present on Hospital Admission: Y  - Side: Right  - Orientation: posterior  - Location: leg  - Primary Wound Type: Blisters  -MC    Dressing Appearance  intact;moist drainage  -      Base  moist;red;pink;yellow;slough;epithelialization notable increase in epithelialization to wound bed  -      Periwound  intact;dry;redness;swelling  -      Periwound Temperature  warm  -      Periwound Skin Turgor  soft  -      Edges  irregular;open  -      Drainage Characteristics/Odor  serosanguineous;sanguineous  -      Drainage Amount  moderate  -      Care, Wound  cleansed with;wound cleanser;aditi boot  -      Dressing Care  dressing applied;gauze, antimicrobial;other (see comments) sorbact, unna boot, qwick/optilock, cast padding, coban  -      Periwound Care  cleansed with pH balanced cleanser;barrier ointment applied zguard  -      Retired Wound - Properties Group Date first assessed: 02/19/21  - Time first assessed: 1430  - Present on Hospital Admission: Y  - Side: Right  - Location: leg  - Primary Wound Type: Blisters  -MC      User Key  (r) = Recorded By, (t) = Taken By, (c) = Cosigned By    Initials Name Provider Type    Lizz Byrd PT Physical Therapist        Lymphedema     Row Name 03/03/21 1515             Lymphedema Edema Assessment    Ptting Edema Category  By severity  -      Pitting Edema  Mild  -         Skin Changes/Observations    Lower Extremity Conditions  left:;intact;bilateral:;clean;dry;shiny;hairless;right:;inflamed  -      Lower Extremity Color/Pigment  bilateral:;red;blanchable;hyperpigmented;brawny  -         Lymphedema Pulses/Capillary Refill    Lower Extremity Capillary Refill  right:;left:;less than 3 seconds  -         Compression/Skin Care     Compression/Skin Care  skin care;wrapping location;bandaging  -MC      Skin Care  washed/dried  -MC      Wrapping Location  lower extremity  -MC      Wrapping Location LE  bilateral:;foot to knee  -MC      Wrapping Comments  RLE: sorbact, unna boot, qwick, optilock, abd pad anterior ankle, cast padding, coban, spandage. LLE: size 3.5/4 compressogrip doubled and overlapping to create gradient compression  -MC      Bandage Layers  padding/fluff layer;cotton elastic stocking- double layer (comment size)  -MC      Bandaging Technique  figure-eight;light compression  -MC        User Key  (r) = Recorded By, (t) = Taken By, (c) = Cosigned By    Initials Name Provider Type    Lizz Byrd, PT Physical Therapist          WOUND DEBRIDEMENT                   Therapy Education     Row Name 03/03/21 4759             Therapy Education    Education Details  Continue with current POC  -MC      Given  Symptoms/condition management;Bandaging/dressing change  -MC      Program  Reinforced  -MC      How Provided  Verbal  -MC      Provided to  Patient;Caregiver  -MC      Level of Understanding  Verbalized  -MC        User Key  (r) = Recorded By, (t) = Taken By, (c) = Cosigned By    Initials Name Provider Type    Lizz Byrd, PT Physical Therapist          Recommendation and Plan  PT Assessment/Plan     Row Name 03/03/21 7199          PT Assessment    Functional Limitations  Decreased safety during functional activities;Impaired gait;Performance in self-care ADL;Other (comment);Limitations in functional capacity and performance wound, edema management  -MC     Impairments  Integumentary integrity;Edema  -MC     Assessment Comments  LLE edema continues to improve, and is well-managed with MLW to improve venous return and maintain limb girth reduction. RLE wound is notably improved, with new epithelialization around wound edges and only scant biofilm today. Pt still with mod drainage from this area, as well as periwound  inflammation. Pt will continue to benefit from skilled PT wound care to promote healing.  -     Rehab Potential  Good  -     Patient/caregiver participated in establishment of treatment plan and goals  Yes  -     Patient would benefit from skilled therapy intervention  Yes  -        PT Plan    PT Frequency  3x/week  -     Physical Therapy Interventions (Optional Details)  wound care;patient/family education  -     PT Plan Comments  debridement, unna boot/MLW  -       User Key  (r) = Recorded By, (t) = Taken By, (c) = Cosigned By    Initials Name Provider Type    Lizz Byrd, PT Physical Therapist          Goals  PT OP Goals     Row Name 03/03/21 1515          Time Calculation    PT Goal Re-Cert Due Date  05/20/21  -       User Key  (r) = Recorded By, (t) = Taken By, (c) = Cosigned By    Initials Name Provider Type    Lizz Byrd, PT Physical Therapist          PT Goal Re-Cert Due Date: 05/20/21            Time Calculation: Start Time: 1515  Therapy Charges for Today     Code Description Service Date Service Provider Modifiers Qty    18898634581 HC PT STAPPING UNNA BOOT 3/3/2021 Lizz Menon, PT GP, RT 1    46059343675 HC PT MULTI LAYER COMP SYS BELOW KNEE 3/3/2021 Lizz Menon, PT GP, LT 1                  Lizz Menon, PT  3/4/2021

## 2021-03-05 ENCOUNTER — HOSPITAL ENCOUNTER (OUTPATIENT)
Dept: PHYSICAL THERAPY | Facility: HOSPITAL | Age: 86
Setting detail: THERAPIES SERIES
Discharge: HOME | End: 2021-03-05

## 2021-03-05 DIAGNOSIS — R60.0 BILATERAL LOWER EXTREMITY EDEMA: ICD-10-CM

## 2021-03-05 DIAGNOSIS — I87.8 VENOUS STASIS OF BOTH LOWER EXTREMITIES: ICD-10-CM

## 2021-03-05 DIAGNOSIS — S81.801D OPEN WOUND OF RIGHT LOWER EXTREMITY, SUBSEQUENT ENCOUNTER: Primary | ICD-10-CM

## 2021-03-05 PROCEDURE — 29581 APPL MULTLAYER CMPRN SYS LEG: CPT

## 2021-03-05 PROCEDURE — 29580 STRAPPING UNNA BOOT: CPT

## 2021-03-05 NOTE — THERAPY WOUND CARE TREATMENT
Outpatient Rehabilitation - Wound/Debridement Treatment Note  [facility]     Patient Name: Jesus Montiel Jr.  : 1934  MRN: [id number]  Today's Date: 3/5/2021                 Admit Date: 3/5/2021    Visit Dx:    ICD-10-CM ICD-9-CM   1. Open wound of right lower extremity, subsequent encounter  S81.801D V58.89     891.0   2. Venous stasis of both lower extremities  I87.8 459.81   3. Bilateral lower extremity edema  R60.0 782.3       Patient Active Problem List   Diagnosis   • Cellulitis of right leg   • Failure of outpatient treatment   • Hypertension   • Hyperlipidemia   • A-fib (CMS/HCC)        Past Medical History:   Diagnosis Date   • Hyperlipidemia    • Hypertension    • Meniere disease    • Myocardial infarction (CMS/HCC)    • Tremor         Past Surgical History:   Procedure Laterality Date   • ABLATION OF DYSRHYTHMIC FOCUS     • CATARACT EXTRACTION, BILATERAL     • CORONARY ANGIOPLASTY WITH STENT PLACEMENT     • CORONARY ARTERY BYPASS BRING BACK FOR EXPLORATION     • HERNIA REPAIR     • INNER EAR SURGERY     • PACEMAKER IMPLANTATION           EVALUATION  PT Ortho     Row Name 21 2787       Subjective Comments    Subjective Comments  Pt's dtr reports the pt missed his morning meds yesterday, including his diuretics.   -MC       Subjective Pain    Able to rate subjective pain?  yes  -MC    Pre-Treatment Pain Level  2  -MC    Post-Treatment Pain Level  2  -MC       Transfers    Sit-Stand [place] (Transfers)  standby assist  -MC    Stand-Sit [place] (Transfers)  standby assist  -MC    Transfers, Sit-Stand-Sit, Assist Device  rolling walker  -MC    Comment (Transfers)  seated for tx  -MC       Gait/Stairs (Locomotion)    [place] Level (Gait)  supervision  -    Assistive Device (Gait)  walker, front-wheeled  -MC      User Key  (r) = Recorded By, (t) = Taken By, (c) = Cosigned By    Initials Name Provider Type    Lizz Byrd PT Physical Therapist          LDA Wound      Row Name 03/05/21 1345             Wound 02/19/21 1430 Right posterior leg Blisters    Wound - Properties Group Placement Date: 02/19/21  - Placement Time: 1430  - Present on Hospital Admission: Y  - Side: Right  - Orientation: posterior  - Location: leg  - Primary Wound Type: Blisters  -    Dressing Appearance  intact;moist drainage  -      Base  moist;red;pink;yellow;slough;epithelialization additional epithelialization to wound bed  -      Periwound  intact;dry;redness;swelling  -      Periwound Temperature  warm  -      Periwound Skin Turgor  soft  -      Edges  irregular;open  -      Drainage Characteristics/Odor  serosanguineous;sanguineous  -      Drainage Amount  small  -      Care, Wound  cleansed with;wound cleanser;aditi boot  -      Dressing Care  dressing applied;gauze, antimicrobial sorbact, unna boot, qwick, coban  -      Periwound Care  cleansed with pH balanced cleanser;barrier ointment applied zguard  -      Retired Wound - Properties Group Date first assessed: 02/19/21  - Time first assessed: 1430  - Present on Hospital Admission: Y  - Side: Right  - Location: leg  - Primary Wound Type: Blisters  -      User Key  (r) = Recorded By, (t) = Taken By, (c) = Cosigned By    Initials Name Provider Type    Lizz Byrd, PT Physical Therapist        Lymphedema     Row Name 03/05/21 1345             Lymphedema Edema Assessment    Ptting Edema Category  By severity  -      Pitting Edema  Mild;Moderate mod edema in feet today, left worse than right  -      Edema Assessment Comment  LLE wrap folded up over patient's ankle, pt does not remember how long it's been like that  -         Skin Changes/Observations    Lower Extremity Conditions  left:;intact;bilateral:;clean;dry;shiny;hairless;right:;inflamed  -      Lower Extremity Color/Pigment  bilateral:;red;blanchable;hyperpigmented;brawny  -      Skin Observations Comment  blanchable redness to  "anterior L ankle  -MC         Lymphedema Pulses/Capillary Refill    Lower Extremity Capillary Refill  right:;left:;less than 3 seconds  -MC         Compression/Skin Care    Compression/Skin Care  skin care;wrapping location;bandaging  -MC      Skin Care  washed/dried  -MC      Wrapping Location  lower extremity  -MC      Wrapping Location LE  bilateral:;foot to knee  -MC      Wrapping Comments  RLE: sorbact, unna boot, qwick, cast padding, coban, size 6 spandage. LLE: 4\" optifoam anterior ankle, size 3.5/4 compressogrip doubled and overlapping to create gradient compression  -MC      Bandage Layers  padding/fluff layer;cotton elastic stocking- double layer (comment size)  -MC      Bandaging Technique  figure-eight;light compression  -MC        User Key  (r) = Recorded By, (t) = Taken By, (c) = Cosigned By    Initials Name Provider Type    Lizz Byrd, PT Physical Therapist          WOUND DEBRIDEMENT                   Therapy Education     Row Name 03/05/21 0587             Therapy Education    Education Details  continue with current POC. Make sure both wraps stay in place from the base of the toes to the proximal calf.  -MC      Given  Symptoms/condition management;Bandaging/dressing change  -MC      Program  Reinforced  -MC      How Provided  Verbal  -MC      Provided to  Patient;Caregiver  -MC      Level of Understanding  Verbalized  -MC        User Key  (r) = Recorded By, (t) = Taken By, (c) = Cosigned By    Initials Name Provider Type    Lizz Byrd, PT Physical Therapist          Recommendation and Plan  PT Assessment/Plan     Row Name 03/05/21 1025          PT Assessment    Functional Limitations  Decreased safety during functional activities;Impaired gait;Performance in self-care ADL;Other (comment);Limitations in functional capacity and performance wound, edema management  -MC     Impairments  Integumentary integrity;Edema  -MC     Assessment Comments  Pt again with notable improvement " to the RLE. Additional epithelialization present, and drainage has improved to minimal serosanguinous, with only scant present on previous optilock. Pt with slightly increased edema to both feet, in part due to missing his diuretic medication yesterday. Pt is improving well with the current POC and will continue to benefit from skilled PT wound care to promote healing.  -     Rehab Potential  Good  -     Patient/caregiver participated in establishment of treatment plan and goals  Yes  -     Patient would benefit from skilled therapy intervention  Yes  -        PT Plan    PT Frequency  3x/week  -     Physical Therapy Interventions (Optional Details)  wound care;patient/family education  -     PT Plan Comments  debridement prn, unna boot/MLW  -       User Key  (r) = Recorded By, (t) = Taken By, (c) = Cosigned By    Initials Name Provider Type    Lizz Byrd, PT Physical Therapist          Goals  PT OP Goals     Row Name 03/05/21 1345          Time Calculation    PT Goal Re-Cert Due Date  05/20/21  -       User Key  (r) = Recorded By, (t) = Taken By, (c) = Cosigned By    Initials Name Provider Type    Lizz yBrd, PT Physical Therapist          PT Goal Re-Cert Due Date: 05/20/21            Time Calculation: Start Time: 1345  Therapy Charges for Today     Code Description Service Date Service Provider Modifiers Qty    15900345843 HC PT MULTI LAYER COMP SYS BELOW KNEE 3/5/2021 Lizz Menon, PT GP, LT 1    33388217819 HC PT STAPPING UNNA BOOT 3/5/2021 Lizz Menon, PT GP, RT 1                  Lizz Menon PT  3/5/2021

## 2021-03-08 ENCOUNTER — HOSPITAL ENCOUNTER (OUTPATIENT)
Dept: PHYSICAL THERAPY | Facility: HOSPITAL | Age: 86
Setting detail: THERAPIES SERIES
Discharge: HOME | End: 2021-03-08

## 2021-03-08 DIAGNOSIS — I87.8 VENOUS STASIS OF BOTH LOWER EXTREMITIES: ICD-10-CM

## 2021-03-08 DIAGNOSIS — R60.0 BILATERAL LOWER EXTREMITY EDEMA: ICD-10-CM

## 2021-03-08 DIAGNOSIS — S81.801D OPEN WOUND OF RIGHT LOWER EXTREMITY, SUBSEQUENT ENCOUNTER: Primary | ICD-10-CM

## 2021-03-08 PROCEDURE — 29580 STRAPPING UNNA BOOT: CPT

## 2021-03-08 PROCEDURE — 29581 APPL MULTLAYER CMPRN SYS LEG: CPT

## 2021-03-08 PROCEDURE — 97597 DBRDMT OPN WND 1ST 20 CM/<: CPT

## 2021-03-09 NOTE — THERAPY WOUND CARE TREATMENT
Outpatient Rehabilitation - Wound/Debridement Treatment Note  [facility]     Patient Name: Jesus Montiel Jr.  : 1934  MRN: [id number]  Today's Date: 3/9/2021                 Admit Date: 3/8/2021    Visit Dx:    ICD-10-CM ICD-9-CM   1. Open wound of right lower extremity, subsequent encounter  S81.801D V58.89     891.0   2. Venous stasis of both lower extremities  I87.8 459.81   3. Bilateral lower extremity edema  R60.0 782.3       Patient Active Problem List   Diagnosis   • Cellulitis of right leg   • Failure of outpatient treatment   • Hypertension   • Hyperlipidemia   • A-fib (CMS/HCC)        Past Medical History:   Diagnosis Date   • Hyperlipidemia    • Hypertension    • Meniere disease    • Myocardial infarction (CMS/HCC)    • Tremor         Past Surgical History:   Procedure Laterality Date   • ABLATION OF DYSRHYTHMIC FOCUS     • CATARACT EXTRACTION, BILATERAL     • CORONARY ANGIOPLASTY WITH STENT PLACEMENT     • CORONARY ARTERY BYPASS BRING BACK FOR EXPLORATION     • HERNIA REPAIR     • INNER EAR SURGERY     • PACEMAKER IMPLANTATION           EVALUATION  PT Ortho     Row Name 21 6347       Subjective Comments    Subjective Comments  Pt reports no pain, and reports he has been walking more lately with no issues. Dtr reports patient has some 15-20 mmHg compression socks/stockings at home.  -MC       Subjective Pain    Able to rate subjective pain?  yes  -MC    Pre-Treatment Pain Level  0  -MC    Post-Treatment Pain Level  0  -MC       Transfers    Sit-Stand [place] (Transfers)  standby assist  -MC    Stand-Sit [place] (Transfers)  standby assist  -    Transfers, Sit-Stand-Sit, Assist Device  straight cane  -    Comment (Transfers)  seated for tx  -       Gait/Stairs (Locomotion)    [place] Level (Gait)  supervision  -    Assistive Device (Gait)  cane, straight  -      User Key  (r) = Recorded By, (t) = Taken By, (c) = Cosigned By    Initials Name Provider Type      Lizz Menon, PT Physical Therapist          LDA Wound     Row Name 03/08/21 1515             Wound 02/19/21 1430 Right posterior leg Blisters    Wound - Properties Group Placement Date: 02/19/21  - Placement Time: 1430  - Present on Hospital Admission: Y  - Side: Right  - Orientation: posterior  - Location: leg  - Primary Wound Type: Blisters  -MC    Dressing Appearance  intact;moist drainage  -      Base  moist;red;pink;epithelialization about 60-70% re-epithelialized today  -      Periwound  intact;dry;redness;swelling  -      Periwound Temperature  warm  -      Periwound Skin Turgor  soft  -      Edges  irregular;open  -      Drainage Characteristics/Odor  serosanguineous;sanguineous  -      Drainage Amount  small  -      Care, Wound  cleansed with;wound cleanser;debrided;aditi boot  -      Dressing Care  dressing changed;gauze, antimicrobial sorbact, unna boot, qwick, cast padding, coban  -      Periwound Care  cleansed with pH balanced cleanser;barrier ointment applied zguard  -      Retired Wound - Properties Group Date first assessed: 02/19/21  - Time first assessed: 1430  - Present on Hospital Admission: Y  - Side: Right  - Location: leg  - Primary Wound Type: Blisters  -MC      User Key  (r) = Recorded By, (t) = Taken By, (c) = Cosigned By    Initials Name Provider Type    Lizz Byrd, PT Physical Therapist        Lymphedema     Row Name 03/08/21 1515             Lymphedema Edema Assessment    Ptting Edema Category  By severity  -      Pitting Edema  Mild  -         Skin Changes/Observations    Lower Extremity Conditions  left:;intact;bilateral:;clean;dry;shiny;hairless;right:;inflamed  -      Lower Extremity Color/Pigment  bilateral:;red;blanchable;hyperpigmented;brawny  -      Skin Observations Comment  red area noted to L ankle now resolved  -         Lymphedema Pulses/Capillary Refill    Lower Extremity Capillary Refill   right:;left:;less than 3 seconds  -MC         Compression/Skin Care    Compression/Skin Care  skin care;wrapping location;bandaging  -MC      Skin Care  washed/dried;lotion applied  -MC      Wrapping Location  lower extremity  -MC      Wrapping Location LE  bilateral:;foot to knee  -MC      Wrapping Comments  RLE: sorbact, unna boot, qwick, cast padding, coban, size 6 spandage. LLE: size 3.5/4 compressogrip doubled and overlapping to create gradient compression  -MC      Bandage Layers  padding/fluff layer;cotton elastic stocking- double layer (comment size)  -MC      Bandaging Technique  figure-eight;light compression  -MC        User Key  (r) = Recorded By, (t) = Taken By, (c) = Cosigned By    Initials Name Provider Type    Lizz Byrd, PT Physical Therapist          WOUND DEBRIDEMENT  Total area of Debridement: 6 cm2  Debridement Site 1  Location- Site 1: RLE  Selective Debridement- Site 1: Wound Surface <20cmsq  Instruments- Site 1: tweezers  Excised Tissue Description- Site 1: scant, slough, minimum, other (comment) (periwound crusting)  Bleeding- Site 1: none             Therapy Education     Row Name 03/08/21 0605             Therapy Education    Education Details  Continue with current POC. May bring and/or start using compression stocking to LLE for long term edema management  -MC      Given  Symptoms/condition management;Bandaging/dressing change  -MC      Program  Reinforced;Progressed  -MC      How Provided  Verbal  -MC      Provided to  Patient;Caregiver  -MC      Level of Understanding  Verbalized  -MC        User Key  (r) = Recorded By, (t) = Taken By, (c) = Cosigned By    Initials Name Provider Type    Lizz Byrd, PT Physical Therapist          Recommendation and Plan  PT Assessment/Plan     Row Name 03/08/21 1511          PT Assessment    Functional Limitations  Decreased safety during functional activities;Impaired gait;Performance in self-care ADL;Other (comment);Limitations  in functional capacity and performance wound mgmt  -     Impairments  Integumentary integrity;Edema  -     Assessment Comments  Pt continues to demonstrate notable improvement to RLE, with wound about 60-70% re-epithelialized today. Pt required minimal debridement of remaining slough and some periwound crusting. BLE edema remains well-managed with compression therapies. Pt is likely appropriate to transition to compression stockings to the LLE for long term edema management. Pt will continue to benefit from skilled PT wound care to promote healing.  -     Rehab Potential  Good  -     Patient/caregiver participated in establishment of treatment plan and goals  Yes  -     Patient would benefit from skilled therapy intervention  Yes  -        PT Plan    PT Frequency  3x/week  -     Physical Therapy Interventions (Optional Details)  wound care;patient/family education  -     PT Plan Comments  debridement prn, unna boot/MLW  -       User Key  (r) = Recorded By, (t) = Taken By, (c) = Cosigned By    Initials Name Provider Type    Lizz Byrd, PT Physical Therapist          Goals  PT OP Goals     Row Name 03/08/21 1515          Time Calculation    PT Goal Re-Cert Due Date  05/20/21  -       User Key  (r) = Recorded By, (t) = Taken By, (c) = Cosigned By    Initials Name Provider Type     Lizz Menon, PT Physical Therapist          PT Goal Re-Cert Due Date: 05/20/21            Time Calculation: Start Time: 1515  Therapy Charges for Today     Code Description Service Date Service Provider Modifiers Qty    32679622308 HC PT MULTI LAYER COMP SYS BELOW KNEE 3/8/2021 Lizz Menon, PT GP, LT 1    86185917925 HC MODE DEBRIDE OPEN WOUND UP TO 20CM 3/8/2021 Lizz Menon, PT GP 1    84147583073 HC PT STAPPING UNNA BOOT 3/8/2021 Lizz Menon, PT GP, RT 1                  Lizz Menon PT  3/9/2021

## 2021-03-10 ENCOUNTER — HOSPITAL ENCOUNTER (OUTPATIENT)
Dept: PHYSICAL THERAPY | Facility: HOSPITAL | Age: 86
Setting detail: THERAPIES SERIES
Discharge: HOME | End: 2021-03-10

## 2021-03-10 DIAGNOSIS — S81.801D OPEN WOUND OF RIGHT LOWER EXTREMITY, SUBSEQUENT ENCOUNTER: Primary | ICD-10-CM

## 2021-03-10 DIAGNOSIS — R60.0 BILATERAL LOWER EXTREMITY EDEMA: ICD-10-CM

## 2021-03-10 DIAGNOSIS — I87.8 VENOUS STASIS OF BOTH LOWER EXTREMITIES: ICD-10-CM

## 2021-03-10 PROCEDURE — 29580 STRAPPING UNNA BOOT: CPT

## 2021-03-10 PROCEDURE — 97597 DBRDMT OPN WND 1ST 20 CM/<: CPT

## 2021-03-10 PROCEDURE — 29581 APPL MULTLAYER CMPRN SYS LEG: CPT

## 2021-03-10 NOTE — THERAPY WOUND CARE TREATMENT
Outpatient Rehabilitation - Wound/Debridement Treatment Note   [facility]     Patient Name: Jesus Montiel Jr.  : 1934  MRN: [id number]  Today's Date: 3/10/2021                 Admit Date: 3/10/2021    Visit Dx:    ICD-10-CM ICD-9-CM   1. Open wound of right lower extremity, subsequent encounter  S81.801D V58.89     891.0   2. Venous stasis of both lower extremities  I87.8 459.81   3. Bilateral lower extremity edema  R60.0 782.3       Patient Active Problem List   Diagnosis   • Cellulitis of right leg   • Failure of outpatient treatment   • Hypertension   • Hyperlipidemia   • A-fib (CMS/HCC)        Past Medical History:   Diagnosis Date   • Hyperlipidemia    • Hypertension    • Meniere disease    • Myocardial infarction (CMS/HCC)    • Tremor         Past Surgical History:   Procedure Laterality Date   • ABLATION OF DYSRHYTHMIC FOCUS     • CATARACT EXTRACTION, BILATERAL     • CORONARY ANGIOPLASTY WITH STENT PLACEMENT     • CORONARY ARTERY BYPASS BRING BACK FOR EXPLORATION     • HERNIA REPAIR     • INNER EAR SURGERY     • PACEMAKER IMPLANTATION           EVALUATION  PT Ortho     Row Name 03/10/21 8070       Subjective Comments    Subjective Comments  No issues or complaints noted. Daughter states pt has been elevating LE's more.  -MP       Subjective Pain    Able to rate subjective pain?  yes  -MP    Pre-Treatment Pain Level  0  -MP    Post-Treatment Pain Level  0  -MP       Transfers    Sit-Stand [place] (Transfers)  standby assist  -MP    Stand-Sit [place] (Transfers)  standby assist  -MP    Transfers, Sit-Stand-Sit, Assist Device  straight cane  -MP    Comment (Transfers)  seated for tx  -MP       Gait/Stairs (Locomotion)    [place] Level (Gait)  supervision  -MP    Assistive Device (Gait)  cane, straight  -MP      User Key  (r) = Recorded By, (t) = Taken By, (c) = Cosigned By    Initials Name Provider Type    Kike Reynolds PT Physical Therapist          LDA Wound     Row Name  03/10/21 1515             Wound 02/19/21 1430 Right posterior leg Blisters    Wound - Properties Group Placement Date: 02/19/21  - Placement Time: 1430  - Present on Hospital Admission: Y  - Side: Right  - Orientation: posterior  - Location: leg  -MC Primary Wound Type: Blisters  -MC    Dressing Appearance  intact;moist drainage  -MP      Base  moist;red;pink;epithelialization increased re-epithelialized; skin bridge present  -MP      Periwound  intact;dry;redness;swelling  -MP      Periwound Temperature  warm  -MP      Periwound Skin Turgor  soft  -MP      Edges  irregular;open  -MP      Drainage Characteristics/Odor  serosanguineous;sanguineous  -MP      Drainage Amount  small  -MP      Care, Wound  cleansed with;wound cleanser;aditi boot  -MP      Dressing Care  dressing changed;gauze, antimicrobial sorbact, unna boot, Qwick, cast padding, coban  -MP      Periwound Care  cleansed with pH balanced cleanser;barrier ointment applied;other (see comments) Zguard  -      Retired Wound - Properties Group Date first assessed: 02/19/21  - Time first assessed: 1430  - Present on Hospital Admission: Y  - Side: Right  - Location: leg  - Primary Wound Type: Blisters  -MC      User Key  (r) = Recorded By, (t) = Taken By, (c) = Cosigned By    Initials Name Provider Type    Lizz Byrd, PT Physical Therapist    Kike Reynolds, PT Physical Therapist        Lymphedema     Row Name 03/10/21 1515             Lymphedema Edema Assessment    Ptting Edema Category  By severity  -MP      Pitting Edema  Mild  -MP         Skin Changes/Observations    Lower Extremity Conditions  left:;intact;bilateral:;clean;dry;shiny;hairless;right:;inflamed  -MP      Lower Extremity Color/Pigment  bilateral:;red;blanchable;hyperpigmented;brawny  -MP         Lymphedema Pulses/Capillary Refill    Lower Extremity Capillary Refill  right:;left:;less than 3 seconds  -MP         Compression/Skin Care    Compression/Skin Care   skin care;wrapping location;bandaging  -MP      Skin Care  washed/dried;lotion applied  -MP      Wrapping Location  lower extremity  -MP      Wrapping Location LE  bilateral:;foot to knee  -MP      Wrapping Comments  R LE: sorbact, unna boot, Qwick, cast padding, 4'' coban, size 5 spandage. L LE: 3.5/4 compressogrip doubled and overlapping for gradient compression  -MP      Bandage Layers  padding/fluff layer;cotton elastic stocking- double layer (comment size)  -MP      Bandaging Technique  figure-eight;light compression  -MP        User Key  (r) = Recorded By, (t) = Taken By, (c) = Cosigned By    Initials Name Provider Type    Kike Reynolds, PT Physical Therapist          WOUND DEBRIDEMENT  Total area of Debridement: 2 cm2  Debridement Site 1  Location- Site 1: RLE  Selective Debridement- Site 1: Wound Surface <20cmsq  Instruments- Site 1: tweezers  Excised Tissue Description- Site 1: scant, slough, other (comment) (periwound crust)  Bleeding- Site 1: none             Therapy Education     Row Name 03/10/21 4268             Therapy Education    Given  Symptoms/condition management;Bandaging/dressing change  -MP      Program  Reinforced;Progressed  -MP      How Provided  Verbal  -MP      Provided to  Patient;Caregiver  -MP      Level of Understanding  Verbalized  -MP        User Key  (r) = Recorded By, (t) = Taken By, (c) = Cosigned By    Initials Name Provider Type    Kike Reynolds, PT Physical Therapist          Recommendation and Plan  PT Assessment/Plan     Row Name 03/10/21 3445          PT Assessment    Functional Limitations  Decreased safety during functional activities;Impaired gait;Performance in self-care ADL;Other (comment);Limitations in functional capacity and performance wound mgmt  -MP     Impairments  Integumentary integrity;Edema  -MP     Assessment Comments  R LE wound continues to re-epithelialize with skin bridge present with only scant debridement needed. R LE drainage is much improved  this date with only scant drainage present on Qwick dressing. B LE edema continues to be controlled well with compression. Patient would continue to benefit from skilled PT wound care to promote increased wound healing potential.  -MP     Rehab Potential  Good  -MP     Patient/caregiver participated in establishment of treatment plan and goals  Yes  -MP     Patient would benefit from skilled therapy intervention  Yes  -MP        PT Plan    PT Frequency  3x/week  -MP     Physical Therapy Interventions (Optional Details)  patient/family education;wound care  -MP     PT Plan Comments  debridement PRN, unna boot/MLW  -MP       User Key  (r) = Recorded By, (t) = Taken By, (c) = Cosigned By    Initials Name Provider Type    Kike Reynolds, PT Physical Therapist          Goals  PT OP Goals     Row Name 03/10/21 1515          Time Calculation    PT Goal Re-Cert Due Date  05/20/21  -MP       User Key  (r) = Recorded By, (t) = Taken By, (c) = Cosigned By    Initials Name Provider Type    Kike Reynolds, PT Physical Therapist          PT Goal Re-Cert Due Date: 05/20/21            Time Calculation: Start Time: 1515  Therapy Charges for Today     Code Description Service Date Service Provider Modifiers Qty    45075909941 HC PT STAPPING UNNA BOOT 3/10/2021 Kike Siddiqui, PT GP 1    97182702451 HC PT MULTI LAYER COMP SYS BELOW KNEE 3/10/2021 Kike Siddiqui, PT GP 1    50996432087 HC MODE DEBRIDE OPEN WOUND UP TO 20CM 3/10/2021 Kike Siddiqui, PT GP 1                  Kike Siddiqui PT  3/10/2021

## 2021-03-12 ENCOUNTER — HOSPITAL ENCOUNTER (OUTPATIENT)
Dept: PHYSICAL THERAPY | Facility: HOSPITAL | Age: 86
Setting detail: THERAPIES SERIES
Discharge: HOME | End: 2021-03-12

## 2021-03-12 DIAGNOSIS — I87.8 VENOUS STASIS OF BOTH LOWER EXTREMITIES: ICD-10-CM

## 2021-03-12 DIAGNOSIS — S81.801D OPEN WOUND OF RIGHT LOWER EXTREMITY, SUBSEQUENT ENCOUNTER: Primary | ICD-10-CM

## 2021-03-12 DIAGNOSIS — R60.0 BILATERAL LOWER EXTREMITY EDEMA: ICD-10-CM

## 2021-03-12 PROCEDURE — 29580 STRAPPING UNNA BOOT: CPT

## 2021-03-12 PROCEDURE — 29581 APPL MULTLAYER CMPRN SYS LEG: CPT

## 2021-03-12 NOTE — THERAPY PROGRESS REPORT/RE-CERT
Outpatient Rehabilitation - Wound/Debridement Progress Note   [facility]     Patient Name: Jesus Montiel Jr.  : 1934  MRN: [id number]  Today's Date: 3/12/2021                 Admit Date: 3/12/2021    Visit Dx:    ICD-10-CM ICD-9-CM   1. Open wound of right lower extremity, subsequent encounter  S81.801D V58.89     891.0   2. Venous stasis of both lower extremities  I87.8 459.81   3. Bilateral lower extremity edema  R60.0 782.3       Patient Active Problem List   Diagnosis   • Cellulitis of right leg   • Failure of outpatient treatment   • Hypertension   • Hyperlipidemia   • A-fib (CMS/HCC)        Past Medical History:   Diagnosis Date   • Hyperlipidemia    • Hypertension    • Meniere disease    • Myocardial infarction (CMS/HCC)    • Tremor         Past Surgical History:   Procedure Laterality Date   • ABLATION OF DYSRHYTHMIC FOCUS     • CATARACT EXTRACTION, BILATERAL     • CORONARY ANGIOPLASTY WITH STENT PLACEMENT     • CORONARY ARTERY BYPASS BRING BACK FOR EXPLORATION     • HERNIA REPAIR     • INNER EAR SURGERY     • PACEMAKER IMPLANTATION           EVALUATION  PT Ortho     Row Name 21 1510       Subjective Comments    Subjective Comments  No complaints or changes.  -       Subjective Pain    Able to rate subjective pain?  yes  -    Pre-Treatment Pain Level  0  -MC    Post-Treatment Pain Level  0  -MC       Transfers    Sit-Stand [place] (Transfers)  supervision  -    Stand-Sit [place] (Transfers)  supervision  -    Transfers, Sit-Stand-Sit, Assist Device  straight cane  -    Comment (Transfers)  seated for tx  -       Gait/Stairs (Locomotion)    [place] Level (Gait)  supervision  -    Assistive Device (Gait)  cane, straight  -      User Key  (r) = Recorded By, (t) = Taken By, (c) = Cosigned By    Initials Name Provider Type    Lizz Byrd, PT Physical Therapist          LDA Wound     Row Name 21 1515             Wound 21 1430 Right posterior  leg Blisters    Wound - Properties Group Placement Date: 02/19/21  - Placement Time: 1430  - Present on Hospital Admission: Y  - Side: Right  - Orientation: posterior  - Location: leg  - Primary Wound Type: Blisters  -    Wound Image  Images linked: 1  -MC      Dressing Appearance  intact;moist drainage  -      Base  moist;red;pink;epithelialization few small open areas remaining  -      Periwound  intact;dry;redness;swelling  -      Periwound Temperature  warm  -      Periwound Skin Turgor  soft  -      Edges  irregular;open  -      Wound Length (cm)  2.6 cm  -      Wound Width (cm)  1 cm  -      Wound Depth (cm)  0.1 cm  -      Drainage Characteristics/Odor  serosanguineous;sanguineous  -      Drainage Amount  small  -      Care, Wound  cleansed with;wound cleanser;debrided;aditi boot  -      Dressing Care  dressing applied;gauze, antimicrobial sorbact, unna boot, cast padding, coban  -      Periwound Care  cleansed with pH balanced cleanser;barrier ointment applied zguard  -      Retired Wound - Properties Group Date first assessed: 02/19/21  - Time first assessed: 1430  - Present on Hospital Admission: Y  - Side: Right  - Location: leg  - Primary Wound Type: Blisters  -      User Key  (r) = Recorded By, (t) = Taken By, (c) = Cosigned By    Initials Name Provider Type     Lizz Menon, PT Physical Therapist        Lymphedema     Row Name 03/12/21 6205             Lymphedema Edema Assessment    Ptting Edema Category  By severity  -      Pitting Edema  Mild;Other (comment) trace  -         Skin Changes/Observations    Lower Extremity Conditions  left:;intact;bilateral:;clean;dry;shiny;hairless;right:;inflamed  -      Lower Extremity Color/Pigment  bilateral:;red;blanchable;hyperpigmented;brawny  -         Lymphedema Pulses/Capillary Refill    Lower Extremity Capillary Refill  right:;left:;less than 3 seconds  -         Compression/Skin Care     Compression/Skin Care  skin care;wrapping location;bandaging  -MC      Skin Care  washed/dried;lotion applied  -MC      Wrapping Location  lower extremity  -MC      Wrapping Location LE  bilateral:;foot to knee  -MC      Wrapping Comments  RLE: sorbact, unna boot, cast padding, coban, size 6 spandage. LLE: size 3.5/4 compressogrip doubled and overlapping to create gradient compression  -MC      Bandage Layers  cotton elastic stocking- double layer (comment size)  -MC      Bandaging Technique  figure-eight;light compression  -MC        User Key  (r) = Recorded By, (t) = Taken By, (c) = Cosigned By    Initials Name Provider Type    Lizz Byrd PT Physical Therapist          WOUND DEBRIDEMENT                   Therapy Education     Row Name 03/12/21 0775             Therapy Education    Education Details  Continue with current POC.  -MC      Given  Symptoms/condition management;Bandaging/dressing change  -MC      Program  Reinforced  -MC      How Provided  Verbal  -MC      Provided to  Patient;Caregiver  -MC      Level of Understanding  Verbalized  -MC        User Key  (r) = Recorded By, (t) = Taken By, (c) = Cosigned By    Initials Name Provider Type    Lizz Byrd PT Physical Therapist          Recommendation and Plan  PT Assessment/Plan     Row Name 03/12/21 6141          PT Assessment    Functional Limitations  Decreased safety during functional activities;Impaired gait;Performance in self-care ADL;Other (comment);Limitations in functional capacity and performance wound mgmt  -MC     Impairments  Integumentary integrity;Edema  -MC     Assessment Comments  Patient has met one STG and two LTGs, with revision required for his remaining LTG to reflect his progress and adjustments to overall POC. Pt demonstrates notable decrease in wound dimensions, with new epithelialization and no slough present today. Pt has only mild/trace BLE remaining, as edema is currently well controlled with MLW and unna  boot compression systems. Once RLE wound is closed and skin integrity improved, pt will be appropriate for transition to compression stockings or other compression system for long-term venous stasis management.  -     Rehab Potential  Good  -     Patient/caregiver participated in establishment of treatment plan and goals  Yes  -     Patient would benefit from skilled therapy intervention  Yes  -        PT Plan    PT Frequency  3x/week  -     Predicted Duration of Therapy Intervention (PT)  8 visits  -     Planned CPT's?  PT SELF CARE/MGMT/TRAIN 15 MIN: 80650;PT NONSELECT DEBRIDE 15 MIN: 33112;PT MODE DEBRIDE OPEN WOUND UP TO 20 CM: 59152;PT UNNA BOOT: 23508;PT MULTI LAYER COMP SYS LE;PT THER SUPP EA 15 MIN  -     Physical Therapy Interventions (Optional Details)  wound care;patient/family education  -     PT Plan Comments  debridement prn, unna boot/MLW. Have patient bring in compression socks he already has so we can assess fit.  -       User Key  (r) = Recorded By, (t) = Taken By, (c) = Cosigned By    Initials Name Provider Type    Lizz Byrd, PT Physical Therapist          Goals  PT OP Goals     Row Name 21 1515          PT Short Term Goals    STG 1  Pt will verbalize s/sx of infection.  -     STG 1 Progress  Ongoing  -     STG 2  Pt will demonstrate 25% reduction in wound area to indicate healing progress.  -     STG 2 Progress  Met  -        Long Term Goals    LTG 1  Pt/caregiver will demonstrate independence with clean home dressing changes.  -     LTG 1 Progress  Ongoing  -     LTG 2  Pt will demonstrate no remaining open area to indicate healing progress and allow transition to compression stockings.  -     LTG 2 Progress  Goal Revised  -     LTG 3  Pt will demonstrate only mild/trace BLE edema to indicate appropriate edema management.  -     LTG 3 Progress  Met  -        Time Calculation    PT Goal Re-Cert Due Date  21  -       User Key   (r) = Recorded By, (t) = Taken By, (c) = Cosigned By    Initials Name Provider Type    Lizz Byrd, PT Physical Therapist          PT Goal Re-Cert Due Date: 05/20/21  PT Short Term Goals  STG 1: Pt will verbalize s/sx of infection.  STG 1 Progress: Ongoing  STG 2: Pt will demonstrate 25% reduction in wound area to indicate healing progress.  STG 2 Progress: Met  Long Term Goals  LTG 1: Pt/caregiver will demonstrate independence with clean home dressing changes.  LTG 1 Progress: Ongoing  LTG 2: Pt will demonstrate no remaining open area to indicate healing progress and allow transition to compression stockings.  LTG 2 Progress: Goal Revised  LTG 3: Pt will demonstrate only mild/trace BLE edema to indicate appropriate edema management.  LTG 3 Progress: Met      Time Calculation: Start Time: 1515  Therapy Charges for Today     Code Description Service Date Service Provider Modifiers Qty    65769714143 HC PT STAPPING UNNA BOOT 3/12/2021 Lizz Menon, PT GP, RT 1    14389062558 HC PT MULTI LAYER COMP SYS BELOW KNEE 3/12/2021 Lizz Menon, PT GP, LT 1                  Lizz Menon, PT  3/12/2021

## 2021-03-15 ENCOUNTER — HOSPITAL ENCOUNTER (OUTPATIENT)
Dept: PHYSICAL THERAPY | Facility: HOSPITAL | Age: 86
Setting detail: THERAPIES SERIES
Discharge: HOME | End: 2021-03-15

## 2021-03-15 DIAGNOSIS — I87.8 VENOUS STASIS OF BOTH LOWER EXTREMITIES: ICD-10-CM

## 2021-03-15 DIAGNOSIS — S81.801D OPEN WOUND OF RIGHT LOWER EXTREMITY, SUBSEQUENT ENCOUNTER: Primary | ICD-10-CM

## 2021-03-15 DIAGNOSIS — R60.0 BILATERAL LOWER EXTREMITY EDEMA: ICD-10-CM

## 2021-03-15 PROCEDURE — 97597 DBRDMT OPN WND 1ST 20 CM/<: CPT

## 2021-03-15 PROCEDURE — 29581 APPL MULTLAYER CMPRN SYS LEG: CPT

## 2021-03-15 PROCEDURE — 29580 STRAPPING UNNA BOOT: CPT

## 2021-03-15 NOTE — THERAPY WOUND CARE TREATMENT
Outpatient Rehabilitation - Wound/Debridement Treatment Note  [facility]     Patient Name: Jesus Montiel Jr.  : 1934  MRN: [id number]  Today's Date: 3/15/2021                 Admit Date: 3/15/2021    Visit Dx:    ICD-10-CM ICD-9-CM   1. Open wound of right lower extremity, subsequent encounter  S81.801D V58.89     891.0   2. Venous stasis of both lower extremities  I87.8 459.81   3. Bilateral lower extremity edema  R60.0 782.3       Patient Active Problem List   Diagnosis   • Cellulitis of right leg   • Failure of outpatient treatment   • Hypertension   • Hyperlipidemia   • A-fib (CMS/HCC)        Past Medical History:   Diagnosis Date   • Hyperlipidemia    • Hypertension    • Meniere disease    • Myocardial infarction (CMS/HCC)    • Tremor         Past Surgical History:   Procedure Laterality Date   • ABLATION OF DYSRHYTHMIC FOCUS     • CATARACT EXTRACTION, BILATERAL     • CORONARY ANGIOPLASTY WITH STENT PLACEMENT     • CORONARY ARTERY BYPASS BRING BACK FOR EXPLORATION     • HERNIA REPAIR     • INNER EAR SURGERY     • PACEMAKER IMPLANTATION           EVALUATION  PT Ortho     Row Name 03/15/21 1363       Subjective Comments    Subjective Comments  No complaints or changes. Dtr reports they will bring the patient's compression stockings this week.  -MC       Subjective Pain    Able to rate subjective pain?  yes  -MC    Pre-Treatment Pain Level  0  -MC    Post-Treatment Pain Level  0  -MC       Transfers    Sit-Stand [place] (Transfers)  modified independence  -    Stand-Sit [place] (Transfers)  modified independence  -    Transfers, Sit-Stand-Sit, Assist Device  straight cane  -    Comment (Transfers)  seated for tx  -       Gait/Stairs (Locomotion)    [place] Level (Gait)  supervision  -    Assistive Device (Gait)  cane, straight  -      User Key  (r) = Recorded By, (t) = Taken By, (c) = Cosigned By    Initials Name Provider Type    Lizz Byrd, PT Physical Therapist           LDA Wound     Row Name 03/15/21 1345             Wound 02/19/21 1430 Right posterior leg Blisters    Wound - Properties Group Placement Date: 02/19/21  - Placement Time: 1430  - Present on Hospital Admission: Y  - Side: Right  - Orientation: posterior  - Location: leg  - Primary Wound Type: Blisters  -    Dressing Appearance  intact;moist drainage  -      Base  moist;red;pink;epithelialization one small open area remaining  -      Periwound  intact;dry;redness;swelling  -      Periwound Temperature  warm  -      Periwound Skin Turgor  soft  -      Edges  irregular;open  -      Drainage Characteristics/Odor  serosanguineous;sanguineous  -      Drainage Amount  small  -      Care, Wound  cleansed with;wound cleanser;debrided;aditi boot  -      Dressing Care  dressing applied;gauze, antimicrobial sorbact, unna boot, cast padding  -      Periwound Care  cleansed with pH balanced cleanser;barrier ointment applied zguard  -      Retired Wound - Properties Group Date first assessed: 02/19/21  - Time first assessed: 1430  - Present on Hospital Admission: Y  - Side: Right  - Location: leg  - Primary Wound Type: Blisters  -      User Key  (r) = Recorded By, (t) = Taken By, (c) = Cosigned By    Initials Name Provider Type     Lizz Menon, PT Physical Therapist        Lymphedema     Row Name 03/15/21 1345             Lymphedema Edema Assessment    Ptting Edema Category  By severity  -      Pitting Edema  Mild;Other (comment) trace  -         Skin Changes/Observations    Lower Extremity Conditions  left:;intact;bilateral:;clean;dry;shiny;hairless;right:;inflamed  -      Lower Extremity Color/Pigment  bilateral:;red;blanchable;hyperpigmented;brawny  -         Lymphedema Pulses/Capillary Refill    Lower Extremity Capillary Refill  right:;left:;less than 3 seconds  -         Compression/Skin Care    Compression/Skin Care  skin care;wrapping location;bandaging   -MC      Skin Care  washed/dried  -MC      Wrapping Location  lower extremity  -MC      Wrapping Location LE  bilateral:;foot to knee  -MC      Wrapping Comments  RLE: sorbact, unna boot, cast padding, coban, size 6 spandage. LLE: size 3.5/4 compressogrip doubled and overlapping to create gradient compression  -MC      Bandage Layers  cotton elastic stocking- double layer (comment size)  -MC      Bandaging Technique  figure-eight;light compression  -MC        User Key  (r) = Recorded By, (t) = Taken By, (c) = Cosigned By    Initials Name Provider Type    Lizz Byrd, PT Physical Therapist          WOUND DEBRIDEMENT  Total area of Debridement: 6 cm2  Debridement Site 1  Location- Site 1: RLE  Selective Debridement- Site 1: Wound Surface <20cmsq  Instruments- Site 1: tweezers  Excised Tissue Description- Site 1: moderate, other (comment) (periwound crusting/nonviable skin)  Bleeding- Site 1: none             Therapy Education     Row Name 03/15/21 7387             Therapy Education    Education Details  Continue with current POC. Bring compression stockings next visit to assess fit.  -MC      Given  Symptoms/condition management;Bandaging/dressing change  -MC      Program  Reinforced;Progressed  -MC      How Provided  Verbal  -MC      Provided to  Patient;Caregiver  -MC      Level of Understanding  Verbalized  -MC        User Key  (r) = Recorded By, (t) = Taken By, (c) = Cosigned By    Initials Name Provider Type    Lizz Byrd, PT Physical Therapist          Recommendation and Plan  PT Assessment/Plan     Row Name 03/15/21 8308          PT Assessment    Functional Limitations  Decreased safety during functional activities;Impaired gait;Performance in self-care ADL;Other (comment);Limitations in functional capacity and performance wound mgmt  -MC     Impairments  Integumentary integrity;Edema  -MC     Assessment Comments  Pt with only one small open area remaining to the medial RLE. The area is  clean, moist, and granulated, with some periwound crust/nonviable skin that required debridement today. BLE edema is well-managed with current compression systems, and pt will be appropriate to transition to personal compression stockings once RLE skin integrity has improved and the wound is closed. Pt will continue to benefit from skilled PT wound care to promote healing.  -     Rehab Potential  Good  -     Patient/caregiver participated in establishment of treatment plan and goals  Yes  -     Patient would benefit from skilled therapy intervention  Yes  -        PT Plan    PT Frequency  3x/week  -     Physical Therapy Interventions (Optional Details)  wound care;patient/family education  -     PT Plan Comments  debridement, unna boot/MLW  -       User Key  (r) = Recorded By, (t) = Taken By, (c) = Cosigned By    Initials Name Provider Type    Lizz Byrd, PT Physical Therapist          Goals  PT OP Goals     Row Name 03/15/21 1345          Time Calculation    PT Goal Re-Cert Due Date  05/20/21  -       User Key  (r) = Recorded By, (t) = Taken By, (c) = Cosigned By    Initials Name Provider Type    Lizz Byrd, PT Physical Therapist          PT Goal Re-Cert Due Date: 05/20/21            Time Calculation: Start Time: 1345  Therapy Charges for Today     Code Description Service Date Service Provider Modifiers Qty    00565496136 HC PT MULTI LAYER COMP SYS BELOW KNEE 3/15/2021 Lizz Menon, PT GP, LT 1    52091132678 HC MODE DEBRIDE OPEN WOUND UP TO 20CM 3/15/2021 Lizz Menon, PT GP 1    27135709395 HC PT STAPPING UNNA BOOT 3/15/2021 Lizz Menon, PT GP, RT 1                  Lizz Menon PT  3/15/2021

## 2021-03-17 ENCOUNTER — HOSPITAL ENCOUNTER (OUTPATIENT)
Dept: PHYSICAL THERAPY | Facility: HOSPITAL | Age: 86
Setting detail: THERAPIES SERIES
Discharge: HOME | End: 2021-03-17

## 2021-03-17 DIAGNOSIS — I87.8 VENOUS STASIS OF BOTH LOWER EXTREMITIES: ICD-10-CM

## 2021-03-17 DIAGNOSIS — S81.801D OPEN WOUND OF RIGHT LOWER EXTREMITY, SUBSEQUENT ENCOUNTER: Primary | ICD-10-CM

## 2021-03-17 DIAGNOSIS — R60.0 BILATERAL LOWER EXTREMITY EDEMA: ICD-10-CM

## 2021-03-17 PROCEDURE — 29580 STRAPPING UNNA BOOT: CPT

## 2021-03-17 PROCEDURE — 97597 DBRDMT OPN WND 1ST 20 CM/<: CPT

## 2021-03-17 NOTE — THERAPY WOUND CARE TREATMENT
Outpatient Rehabilitation - Wound/Debridement Treatment Note   [place]     Patient Name: Jesus Montiel Jr.  : 1934  MRN: [id number]  Today's Date: 3/17/2021                 Admit Date: 3/17/2021    Visit Dx:    ICD-10-CM ICD-9-CM   1. Open wound of right lower extremity, subsequent encounter  S81.801D V58.89     891.0   2. Venous stasis of both lower extremities  I87.8 459.81   3. Bilateral lower extremity edema  R60.0 782.3       Patient Active Problem List   Diagnosis   • Cellulitis of right leg   • Failure of outpatient treatment   • Hypertension   • Hyperlipidemia   • A-fib (CMS/HCC)        Past Medical History:   Diagnosis Date   • Hyperlipidemia    • Hypertension    • Meniere disease    • Myocardial infarction (CMS/HCC)    • Tremor         Past Surgical History:   Procedure Laterality Date   • ABLATION OF DYSRHYTHMIC FOCUS     • CATARACT EXTRACTION, BILATERAL     • CORONARY ANGIOPLASTY WITH STENT PLACEMENT     • CORONARY ARTERY BYPASS BRING BACK FOR EXPLORATION     • HERNIA REPAIR     • INNER EAR SURGERY     • PACEMAKER IMPLANTATION           EVALUATION  PT Ortho     Row Name 21 7801       Subjective Comments    Subjective Comments  No complaints or changes. Brought his personal compression stockings today  -       Subjective Pain    Able to rate subjective pain?  yes  -    Pre-Treatment Pain Level  0  -    Post-Treatment Pain Level  0  -       Transfers    Sit-Stand [place] (Transfers)  modified independence  -    Stand-Sit [place] (Transfers)  modified independence  -    Transfers, Sit-Stand-Sit, Assist Device  straight cane  -    Comment (Transfers)  seated for tx  -       Gait/Stairs (Locomotion)    [place] Level (Gait)  modified independence  -    Assistive Device (Gait)  cane, straight  -      User Key  (r) = Recorded By, (t) = Taken By, (c) = Cosigned By    Initials Name Provider Type    Lizz Byrd, PT Physical Therapist          LDA  Wound     Row Name 03/17/21 1515             Wound 02/19/21 1430 Right posterior leg Blisters    Wound - Properties Group Placement Date: 02/19/21  - Placement Time: 1430  - Present on Hospital Admission: Y  - Side: Right  - Orientation: posterior  - Location: leg  - Primary Wound Type: Blisters  -    Dressing Appearance  intact;moist drainage  -      Base  moist;red;pink;epithelialization small open area, partially epithelialized  -      Periwound  intact;dry;redness;swelling  -      Periwound Temperature  warm  -      Periwound Skin Turgor  soft  -      Edges  irregular;open  -      Drainage Characteristics/Odor  serosanguineous;sanguineous  -      Drainage Amount  scant  -      Care, Wound  cleansed with;wound cleanser;debrided;aditi boot  -      Dressing Care  dressing applied;gauze, antimicrobial sorbact, unna boot  -      Periwound Care  cleansed with pH balanced cleanser;barrier ointment applied zguard  -      Retired Wound - Properties Group Date first assessed: 02/19/21  - Time first assessed: 1430  - Present on Hospital Admission: Y  - Side: Right  - Location: leg  - Primary Wound Type: Blisters  -MC      User Key  (r) = Recorded By, (t) = Taken By, (c) = Cosigned By    Initials Name Provider Type     Lziz Menon, PT Physical Therapist        Lymphedema     Row Name 03/17/21 1515             Lymphedema Edema Assessment    Ptting Edema Category  By severity  -      Pitting Edema  Other (comment) trace  -         Skin Changes/Observations    Lower Extremity Conditions  left:;intact;bilateral:;clean;dry;shiny;hairless;right:;inflamed  -      Lower Extremity Color/Pigment  bilateral:;red;blanchable;hyperpigmented;brawny  -         Lymphedema Pulses/Capillary Refill    Lower Extremity Capillary Refill  right:;left:;less than 3 seconds  -         Compression/Skin Care    Compression/Skin Care  skin care;wrapping location;bandaging  -      Skin Care  " washed/dried  -MC      Wrapping Location  lower extremity  -MC      Wrapping Location LE  bilateral:;foot to knee  -MC      Wrapping Comments  LLE: applied personal compression stocking, assessed fit. RLE: sorbact, unna boot, 4\" coban, size 6 spandage  -      Bandaging Technique  figure-eight;light compression  -MC        User Key  (r) = Recorded By, (t) = Taken By, (c) = Cosigned By    Initials Name Provider Type    Lizz Byrd, PT Physical Therapist          WOUND DEBRIDEMENT  Total area of Debridement: 2 cm2  Debridement Site 1  Location- Site 1: RLE  Selective Debridement- Site 1: Wound Surface <20cmsq  Instruments- Site 1: tweezers, scissors  Excised Tissue Description- Site 1: minimum, other (comment) (periwound crusting)  Bleeding- Site 1: none             Therapy Education     Row Name 03/17/21 1515             Therapy Education    Education Details  Use compression stocking to LLE. May remove at night. Apply in AM before getting out of bed if possible.  -MC      Given  Symptoms/condition management;Bandaging/dressing change  -MC      Program  Reinforced;Progressed  -MC      How Provided  Verbal  -MC      Provided to  Patient;Caregiver  -MC      Level of Understanding  Verbalized  -MC        User Key  (r) = Recorded By, (t) = Taken By, (c) = Cosigned By    Initials Name Provider Type    Lizz Byrd PT Physical Therapist          Recommendation and Plan  PT Assessment/Plan     Row Name 03/17/21 1515          PT Assessment    Functional Limitations  Decreased safety during functional activities;Impaired gait;Performance in self-care ADL;Other (comment);Limitations in functional capacity and performance wound mgmt  -MC     Impairments  Integumentary integrity;Edema  -MC     Assessment Comments  Pt continues to have only trace edema to the LLE, with appropriate skin integrity to allow for transition back to personal compression stockings. PT assessed fit of these stockings, which appears " appropriate for long-term edema/venous stasis management. Pt with only a very small open area to the RLE, which is partially epithelialized today. Anticipate closure within 1-2 treatments, after which patient will likely be appropriate for 1-2 additional visits with unna boot or MLW to allow time for improved skin integrity before transitioning to compression stocking for the RLE.  -     Rehab Potential  Good  -     Patient/caregiver participated in establishment of treatment plan and goals  Yes  -     Patient would benefit from skilled therapy intervention  Yes  -        PT Plan    PT Frequency  3x/week  -     Physical Therapy Interventions (Optional Details)  wound care;patient/family education  -     PT Plan Comments  debridement, unna boot vs. MLW  -       User Key  (r) = Recorded By, (t) = Taken By, (c) = Cosigned By    Initials Name Provider Type    Lizz Bydr, PT Physical Therapist          Goals  PT OP Goals     Row Name 03/17/21 1515          Time Calculation    PT Goal Re-Cert Due Date  05/20/21  -       User Key  (r) = Recorded By, (t) = Taken By, (c) = Cosigned By    Initials Name Provider Type    Lizz Byrd, PT Physical Therapist          PT Goal Re-Cert Due Date: 05/20/21            Time Calculation: Start Time: 1515  Therapy Charges for Today     Code Description Service Date Service Provider Modifiers Qty    68481049392 HC MODE DEBRIDE OPEN WOUND UP TO 20CM 3/17/2021 Lizz Menon, PT GP 1    24903314930 HC PT STAPPING UNNA BOOT 3/17/2021 Lizz Menon, PT GP 1                  Lizz Menon PT  3/17/2021

## 2021-03-19 ENCOUNTER — HOSPITAL ENCOUNTER (OUTPATIENT)
Dept: PHYSICAL THERAPY | Facility: HOSPITAL | Age: 86
Setting detail: THERAPIES SERIES
Discharge: HOME | End: 2021-03-19

## 2021-03-19 DIAGNOSIS — I87.8 VENOUS STASIS OF BOTH LOWER EXTREMITIES: ICD-10-CM

## 2021-03-19 DIAGNOSIS — R60.0 BILATERAL LOWER EXTREMITY EDEMA: ICD-10-CM

## 2021-03-19 DIAGNOSIS — S81.801D OPEN WOUND OF RIGHT LOWER EXTREMITY, SUBSEQUENT ENCOUNTER: Primary | ICD-10-CM

## 2021-03-19 PROCEDURE — 29580 STRAPPING UNNA BOOT: CPT

## 2021-03-19 NOTE — THERAPY WOUND CARE TREATMENT
Outpatient Rehabilitation - Wound/Debridement Treatment Note   [place]     Patient Name: Jesus Montiel Jr.  : 1934  MRN: [id number]  Today's Date: 3/19/2021                 Admit Date: 3/19/2021    Visit Dx:    ICD-10-CM ICD-9-CM   1. Open wound of right lower extremity, subsequent encounter  S81.801D V58.89     891.0   2. Venous stasis of both lower extremities  I87.8 459.81   3. Bilateral lower extremity edema  R60.0 782.3       Patient Active Problem List   Diagnosis   • Cellulitis of right leg   • Failure of outpatient treatment   • Hypertension   • Hyperlipidemia   • A-fib (CMS/HCC)        Past Medical History:   Diagnosis Date   • Hyperlipidemia    • Hypertension    • Meniere disease    • Myocardial infarction (CMS/HCC)    • Tremor         Past Surgical History:   Procedure Laterality Date   • ABLATION OF DYSRHYTHMIC FOCUS     • CATARACT EXTRACTION, BILATERAL     • CORONARY ANGIOPLASTY WITH STENT PLACEMENT     • CORONARY ARTERY BYPASS BRING BACK FOR EXPLORATION     • HERNIA REPAIR     • INNER EAR SURGERY     • PACEMAKER IMPLANTATION           EVALUATION  PT Ortho     Row Name 21 0707       Subjective Comments    Subjective Comments  No complaints or changes since last treatment  -       Subjective Pain    Able to rate subjective pain?  yes  -MC    Pre-Treatment Pain Level  0  -MC    Post-Treatment Pain Level  0  -MC       Transfers    Sit-Stand [place] (Transfers)  modified independence  -    Stand-Sit [place] (Transfers)  modified independence  -    Transfers, Sit-Stand-Sit, Assist Device  straight cane  -    Comment (Transfers)  seated for tx  -       Gait/Stairs (Locomotion)    [place] Level (Gait)  modified independence  -    Assistive Device (Gait)  cane, straight  -      User Key  (r) = Recorded By, (t) = Taken By, (c) = Cosigned By    Initials Name Provider Type    Lizz Byrd, PT Physical Therapist          LDA Wound     Row Name 21  1515             Wound 02/19/21 1430 Right posterior leg Blisters    Wound - Properties Group Placement Date: 02/19/21  - Placement Time: 1430  - Present on Hospital Admission: Y  - Side: Right  - Orientation: posterior  - Location: leg  - Primary Wound Type: Blisters  -    Dressing Appearance  intact;moist drainage  -      Base  moist;red;pink;epithelialization one, pinpoint area remaining  -      Periwound  intact;dry;redness;swelling  -      Periwound Temperature  warm  -      Periwound Skin Turgor  soft  -      Edges  irregular;open  -      Drainage Characteristics/Odor  serosanguineous;sanguineous  -      Drainage Amount  scant  -      Care, Wound  cleansed with;wound cleanser;aditi boot  -      Dressing Care  dressing applied;gauze, antimicrobial sorbact, unna boot  -      Periwound Care  cleansed with pH balanced cleanser;barrier ointment applied zguard  -      Retired Wound - Properties Group Date first assessed: 02/19/21  - Time first assessed: 1430  - Present on Hospital Admission: Y  - Side: Right  - Location: leg  - Primary Wound Type: Blisters  -      User Key  (r) = Recorded By, (t) = Taken By, (c) = Cosigned By    Initials Name Provider Type    Lziz Byrd, PT Physical Therapist    Lizz Byrd, PT Physical Therapist        Lymphedema     Row Name 03/19/21 1515             Lymphedema Edema Assessment    Ptting Edema Category  By severity  -      Pitting Edema  Other (comment) trace  -         Skin Changes/Observations    Lower Extremity Conditions  right:;clean;dry;shiny;hairless  -      Lower Extremity Color/Pigment  right:;hyperpigmented;brawny  -         Lymphedema Pulses/Capillary Refill    Lower Extremity Capillary Refill  right:;less than 3 seconds  -         Compression/Skin Care    Compression/Skin Care  skin care;wrapping location;bandaging  -      Skin Care  washed/dried  -      Wrapping Location  lower extremity   "-      Wrapping Location LE  right:;foot to knee  -      Wrapping Comments  RLE: sorbact, unna boot, 4\" coban, size 5 spandage. LLE - compression stocking in place, appropriate fit with no wrinkling/bunching.  -      Bandaging Technique  figure-eight;light compression  -        User Key  (r) = Recorded By, (t) = Taken By, (c) = Cosigned By    Initials Name Provider Type    Lizz Byrd, PT Physical Therapist          WOUND DEBRIDEMENT                   Therapy Education     Row Name 03/19/21 1515             Therapy Education    Education Details  Continue with compression stocking to LLE, it's ok if it's accidentally left on overnight as long as it's not a consistent habit. Removal is important for hygiene and skin inspection. Continue with POC to RLE.  -MC      Given  Symptoms/condition management;Bandaging/dressing change  -      Program  Reinforced  -      How Provided  Verbal  -MC      Provided to  Patient;Caregiver  -MC      Level of Understanding  Verbalized  -        User Key  (r) = Recorded By, (t) = Taken By, (c) = Cosigned By    Initials Name Provider Type    Lizz Byrd PT Physical Therapist          Recommendation and Plan  PT Assessment/Plan     Row Name 03/19/21 1514          PT Assessment    Functional Limitations  Decreased safety during functional activities;Impaired gait;Performance in self-care ADL;Other (comment);Limitations in functional capacity and performance wound mgmt  -     Impairments  Integumentary integrity;Edema  -     Assessment Comments  Pt with one, pinpoint area to the RLE remaining. PT anticipates closure by next visit. No debridement required today. RLE edema is now only trace. Once area is closed, pt will benefit from treatment with unna boot for 1-2 treatments to allow for improved skin integrity before transition to compression stockings.  -     Rehab Potential  Good  -MC     Patient/caregiver participated in establishment of treatment " plan and goals  Yes  -     Patient would benefit from skilled therapy intervention  Yes  -        PT Plan    PT Frequency  3x/week  -     Physical Therapy Interventions (Optional Details)  wound care;patient/family education  -     PT Plan Comments  debridement, unna boot vs. MLW  -       User Key  (r) = Recorded By, (t) = Taken By, (c) = Cosigned By    Initials Name Provider Type    Lizz Byrd, PT Physical Therapist          Goals  PT OP Goals     Row Name 03/19/21 1515          Time Calculation    PT Goal Re-Cert Due Date  05/20/21  -       User Key  (r) = Recorded By, (t) = Taken By, (c) = Cosigned By    Initials Name Provider Type     Lizz Menon, PT Physical Therapist          PT Goal Re-Cert Due Date: 05/20/21            Time Calculation: Start Time: 1515  Therapy Charges for Today     Code Description Service Date Service Provider Modifiers Qty    86068631599 HC PT STAPPING UNNA BOOT 3/19/2021 Lizz Menon, PT GP 1                  Lizz Menon, PT  3/19/2021

## 2021-03-22 ENCOUNTER — HOSPITAL ENCOUNTER (OUTPATIENT)
Dept: PHYSICAL THERAPY | Facility: HOSPITAL | Age: 86
Setting detail: THERAPIES SERIES
Discharge: HOME | End: 2021-03-22

## 2021-03-22 DIAGNOSIS — S81.801D OPEN WOUND OF RIGHT LOWER EXTREMITY, SUBSEQUENT ENCOUNTER: Primary | ICD-10-CM

## 2021-03-22 PROCEDURE — 29580 STRAPPING UNNA BOOT: CPT

## 2021-03-22 PROCEDURE — 97597 DBRDMT OPN WND 1ST 20 CM/<: CPT

## 2021-03-22 NOTE — THERAPY WOUND CARE TREATMENT
"    Outpatient Rehabilitation - Wound/Debridement Treatment Note  [facility]     Patient Name: Jesus Montiel Jr.  : 1934  MRN: [id number]  Today's Date: 3/22/2021                 Admit Date: 3/22/2021    Visit Dx:    ICD-10-CM ICD-9-CM   1. Open wound of right lower extremity, subsequent encounter  S81.801D V58.89     891.0       Patient Active Problem List   Diagnosis   • Cellulitis of right leg   • Failure of outpatient treatment   • Hypertension   • Hyperlipidemia   • A-fib (CMS/HCC)        Past Medical History:   Diagnosis Date   • Hyperlipidemia    • Hypertension    • Meniere disease    • Myocardial infarction (CMS/HCC)    • Tremor         Past Surgical History:   Procedure Laterality Date   • ABLATION OF DYSRHYTHMIC FOCUS     • CATARACT EXTRACTION, BILATERAL     • CORONARY ANGIOPLASTY WITH STENT PLACEMENT     • CORONARY ARTERY BYPASS BRING BACK FOR EXPLORATION     • HERNIA REPAIR     • INNER EAR SURGERY     • PACEMAKER IMPLANTATION           EVALUATION  PT Ortho     Row Name 21 1500       Subjective Comments    Subjective Comments  Pt without complaints.  Daughter brought pt's stocking for RLE \"just in case\".  -       Subjective Pain    Able to rate subjective pain?  yes  -    Pre-Treatment Pain Level  0  -    Post-Treatment Pain Level  0  -JM       Transfers    Sit-Stand [place] (Transfers)  modified independence  -    Stand-Sit [place] (Transfers)  modified independence  -    Transfers, Sit-Stand-Sit, Assist Device  straight cane  -    Comment (Transfers)  seated for tx  -       Gait/Stairs (Locomotion)    [place] Level (Gait)  modified independence  -    Assistive Device (Gait)  cane, straight  -      User Key  (r) = Recorded By, (t) = Taken By, (c) = Cosigned By    Initials Name Provider Type    Sandy Altman, PT Physical Therapist          [facility] Wound     Row Name 21 1500             Wound 21 1430 Right posterior leg Blisters    Wound - " Denies known Latex allergy or symptoms of Latex sensitivity.  Medications reviewed and updated.  Eileen Chacon MD  No chaperone needed       Jennifer L Marge is a 37 year old female presenting with   Chief Complaint   Patient presents with   • Instruct DM - Follow Up     2 wks follow up         Properties Group Placement Date: 02/19/21  - Placement Time: 1430  - Present on Hospital Admission: Y  - Side: Right  - Orientation: posterior  - Location: leg  - Primary Wound Type: Blisters  -    Dressing Appearance  dry;intact;no drainage  -      Base  pink;closed/resurfaced;dry;epithelialization;other (see comments) small dry crust, no visible open area  -      Periwound  intact;dry;redness;swelling fragile red periwound skin, min hypertrophic crusting  -      Periwound Temperature  warm  -      Periwound Skin Turgor  soft  -      Edges  rolled/closed  -      Drainage Amount  none  -      Care, Wound  cleansed with;wound cleanser;debrided;aditi boot  -      Dressing Care  dressing changed unna boot  -      Periwound Care  cleansed with pH balanced cleanser;barrier ointment applied;dry periwound area maintained zguard/eucerin mix  -      Retired Wound - Properties Group Date first assessed: 02/19/21  - Time first assessed: 1430  - Present on Hospital Admission: Y  - Side: Right  - Location: leg  - Primary Wound Type: Blisters  -      User Key  (r) = Recorded By, (t) = Taken By, (c) = Cosigned By    Initials Name Provider Type    Lizz Byrd, PT Physical Therapist    Sandy Altman, PT Physical Therapist        Lymphedema     Row Name 03/22/21 1500             Lymphedema Edema Assessment    Ptting Edema Category  By severity  -      Pitting Edema  Other (comment) trace  -         Skin Changes/Observations    Lower Extremity Conditions  right:;clean;dry;shiny;hairless  -      Lower Extremity Color/Pigment  right:;hyperpigmented;brawny  -         Lymphedema Pulses/Capillary Refill    Lower Extremity Capillary Refill  right:;less than 3 seconds  -         Compression/Skin Care    Compression/Skin Care  skin care;wrapping location;bandaging  -      Skin Care  washed/dried  -      Wrapping Location  lower extremity  -      Wrapping Location LE  " right:;foot to knee  -      Wrapping Comments  unna boot, 4\" coban, size 6 spandage  -      Bandaging Technique  figure-eight;light compression  -        User Key  (r) = Recorded By, (t) = Taken By, (c) = Cosigned By    Initials Name Provider Type    Sandy Altman, PT Physical Therapist          WOUND DEBRIDEMENT  Total area of Debridement: 4cmsq  Debridement Site 1  Location- Site 1: RLE  Selective Debridement- Site 1: Wound Surface <20cmsq  Instruments- Site 1: tweezers, other (comment) (readycleanse wipes)  Excised Tissue Description- Site 1: minimum, other (comment) (loose hypertrophic crusts)  Bleeding- Site 1: none             Therapy Education     Row Name 03/22/21 1500             Therapy Education    Education Details  Plan to decrease to once/week due to no open wounds, and potential for transition to compression stocking next tx.  -JM      Given  Symptoms/condition management;Bandaging/dressing change  -      Program  Reinforced  -      How Provided  Verbal  -JM      Provided to  Patient;Caregiver  -      Level of Understanding  Verbalized  -        User Key  (r) = Recorded By, (t) = Taken By, (c) = Cosigned By    Initials Name Provider Type    Sandy Altman, PT Physical Therapist          Recommendation and Plan  PT Assessment/Plan     Row Name 03/22/21 1500          PT Assessment    Functional Limitations  Decreased safety during functional activities;Impaired gait;Performance in self-care ADL;Other (comment);Limitations in functional capacity and performance wound mgmt  -     Impairments  Integumentary integrity;Edema  -JM     Assessment Comments  RLE without drainage or visible open wounds today.  Med/post RLE still fragile and red with hypertrophic crusts requiring debridement, continues to benefit from unna boot to improve skin integrity and increase venous return.  Pt appropriate to reduce to once/week tx now that open wound has closed.  May be able to transition to " compression stocking in 1-2 txs.  -        PT Plan    PT Frequency  1x/week  -     Physical Therapy Interventions (Optional Details)  patient/family education;wound care  -     PT Plan Comments  unna boot vs MLW or stocking  -       User Key  (r) = Recorded By, (t) = Taken By, (c) = Cosigned By    Initials Name Provider Type    Sandy Altman, PT Physical Therapist          Goals  PT OP Goals     Row Name 03/22/21 1500          Time Calculation    PT Goal Re-Cert Due Date  05/20/21  -       User Key  (r) = Recorded By, (t) = Taken By, (c) = Cosigned By    Initials Name Provider Type    Sandy Altman, PT Physical Therapist          PT Goal Re-Cert Due Date: 05/20/21            Time Calculation: Start Time: 1500  Therapy Charges for Today     Code Description Service Date Service Provider Modifiers Qty    20793312229 HC MODE DEBRIDE OPEN WOUND UP TO 20CM 3/22/2021 Sandy Stokes, PT GP 1    42190505552 HC PT STAPPING UNNA BOOT 3/22/2021 Sandy Stokes, PT GP 1                  Sandy Stokes, PT  3/22/2021

## 2021-03-29 ENCOUNTER — HOSPITAL ENCOUNTER (OUTPATIENT)
Dept: PHYSICAL THERAPY | Facility: HOSPITAL | Age: 86
Setting detail: THERAPIES SERIES
Discharge: HOME | End: 2021-03-29

## 2021-03-29 DIAGNOSIS — I87.8 VENOUS STASIS OF BOTH LOWER EXTREMITIES: ICD-10-CM

## 2021-03-29 DIAGNOSIS — S81.801D OPEN WOUND OF RIGHT LOWER EXTREMITY, SUBSEQUENT ENCOUNTER: Primary | ICD-10-CM

## 2021-03-29 DIAGNOSIS — R60.0 BILATERAL LOWER EXTREMITY EDEMA: ICD-10-CM

## 2021-03-29 PROCEDURE — 97602 WOUND(S) CARE NON-SELECTIVE: CPT

## 2021-03-29 PROCEDURE — 97535 SELF CARE MNGMENT TRAINING: CPT

## 2021-03-29 NOTE — THERAPY WOUND CARE TREATMENT
Outpatient Rehabilitation - Wound/Debridement Treatment Note   [place]     Patient Name: Jesus Montiel Jr.  : 1934  MRN: [id number]  Today's Date: 3/29/2021                 Admit Date: 3/29/2021    Visit Dx:    ICD-10-CM ICD-9-CM   1. Open wound of right lower extremity, subsequent encounter  S81.801D V58.89     891.0   2. Venous stasis of both lower extremities  I87.8 459.81   3. Bilateral lower extremity edema  R60.0 782.3       Patient Active Problem List   Diagnosis   • Cellulitis of right leg   • Failure of outpatient treatment   • Hypertension   • Hyperlipidemia   • A-fib (CMS/HCC)        Past Medical History:   Diagnosis Date   • Hyperlipidemia    • Hypertension    • Meniere disease    • Myocardial infarction (CMS/HCC)    • Tremor         Past Surgical History:   Procedure Laterality Date   • ABLATION OF DYSRHYTHMIC FOCUS     • CATARACT EXTRACTION, BILATERAL     • CORONARY ANGIOPLASTY WITH STENT PLACEMENT     • CORONARY ARTERY BYPASS BRING BACK FOR EXPLORATION     • HERNIA REPAIR     • INNER EAR SURGERY     • PACEMAKER IMPLANTATION           EVALUATION  PT Ortho     Row Name 21 1515       Subjective Comments    Subjective Comments  No complaints  -       Subjective Pain    Able to rate subjective pain?  yes  -JM    Pre-Treatment Pain Level  0  -JM    Post-Treatment Pain Level  0  -JM       Transfers    Sit-Stand [place] (Transfers)  modified independence  -    Stand-Sit [place] (Transfers)  modified independence  -    Transfers, Sit-Stand-Sit, Assist Device  straight cane  -    Comment (Transfers)  seated for tx  -JM       Gait/Stairs (Locomotion)    [place] Level (Gait)  modified independence  -    Assistive Device (Gait)  cane, straight  -      User Key  (r) = Recorded By, (t) = Taken By, (c) = Cosigned By    Initials Name Provider Type    Sandy Altman, PT Physical Therapist          [facility] Wound     Row Name 21 1515             Wound 21  1430 Right posterior leg Blisters    Wound - Properties Group Placement Date: 02/19/21  - Placement Time: 1430  - Present on Hospital Admission: Y  - Side: Right  - Orientation: posterior  - Location: leg  - Primary Wound Type: Blisters  -MC    Dressing Appearance  dry;intact;no drainage  -      Base  pink;closed/resurfaced;dry;epithelialization;other (see comments) small dry crust, no visible open area  -      Periwound  intact;dry;redness;swelling fragile red periwound skin, min hypertrophic crusting  -      Periwound Temperature  warm  -      Periwound Skin Turgor  soft  -      Edges  rolled/closed  -      Drainage Amount  none  -      Care, Wound  cleansed with;wound cleanser;debrided  -      Dressing Care  other (see comments) single aize 3.5 compressogrip  -      Periwound Care  cleansed with pH balanced cleanser;dry periwound area maintained  -      Retired Wound - Properties Group Date first assessed: 02/19/21  - Time first assessed: 1430  - Present on Hospital Admission: Y  - Side: Right  - Location: leg  - Primary Wound Type: Blisters  -MC      User Key  (r) = Recorded By, (t) = Taken By, (c) = Cosigned By    Initials Name Provider Type     Lizz Menon, PT Physical Therapist     Sandy Stokes, PT Physical Therapist        Lymphedema     Row Name 03/29/21 1515             Lymphedema Edema Assessment    Ptting Edema Category  By severity  -      Pitting Edema  Other (comment) trace  -         Skin Changes/Observations    Lower Extremity Conditions  right:;clean;dry;hairless;crust;fragile  -      Lower Extremity Color/Pigment  right:;hyperpigmented;brawny  -         Lymphedema Pulses/Capillary Refill    Lower Extremity Capillary Refill  right:;less than 3 seconds  -         Lymphedema Measurements    Measurement Type(s)  Quick Girth  -      Quick Girth Areas  Lower extremities  -         RLE Quick Girth (cm)    Met-heads  22.3 cm  -       Mid foot  22 cm  -JM      Smallest ankle  21.5 cm  -JM      Largest calf  31.3 cm  -JM      Tib tuberosity  36 cm  -JM      RLE Quick Girth Total  133.1  -JM         Compression/Skin Care    Compression/Skin Care  skin care;wrapping location;bandaging  -      Skin Care  washed/dried  -JM      Wrapping Location  lower extremity  -      Wrapping Location LE  right:;foot to knee  -      Bandage Layers  cotton elastic stocking- single layer (comment size) size 3.5 compressogrip  -      Bandaging Technique  --  -        User Key  (r) = Recorded By, (t) = Taken By, (c) = Cosigned By    Initials Name Provider Type    Sandy Altman, PT Physical Therapist          WOUND DEBRIDEMENT  Total area of Debridement: nonselective  Debridement Site 1  Location- Site 1: RLE  Selective Debridement- Site 1: Wound Surface <20cmsq  Instruments- Site 1: other (comment) (readycleanse wipes)  Excised Tissue Description- Site 1: minimum, other (comment) (loose hypertrophic crusts)  Bleeding- Site 1: none             Therapy Education     Row Name 03/29/21 4627             Therapy Education    Education Details  Educated on donning/doffing techniques for easier stocking application.  Pt OK to shower and perform skin care/apply moistuizer.  Recommended daily use of compression stocking, leg elevation when at rest, monitor for S&S of infection.  Plan to tentatively d/c today but may follow up PRN next 30 days.  -JM      Given  Symptoms/condition management;Edema management  -DIANE      Program  Progressed  -DIANE      How Provided  Verbal;Demonstration  -DIANE      Provided to  Patient;Caregiver  -DIANE      Level of Understanding  Verbalized  -        User Key  (r) = Recorded By, (t) = Taken By, (c) = Cosigned By    Initials Name Provider Type    Sandy Altman, PT Physical Therapist          Recommendation and Plan  PT Assessment/Plan     Row Name 03/29/21 9548          PT Assessment    Functional Limitations  Decreased  safety during functional activities;Impaired gait;Performance in self-care ADL;Other (comment);Limitations in functional capacity and performance wound mgmt  -     Impairments  Integumentary integrity;Edema  -     Assessment Comments  Pt without open wounds or drainage, skin integrity improved enough for d/c of unna boot and transition to compression stockings.  PT lightly debrided loose crusts and applied size 3.5 compressogrip for light compression until able to don stockings, educated on home edema management and skincare.  Pt has met all PT goals, will be tentatively d/c'd but instructed pt to return for tx w/n 30 days if area re-opens or starts draining, or for concerns about home edema management.  -        PT Plan    PT Plan Comments  tentative d/c, follow up PRN next 30 days  -       User Key  (r) = Recorded By, (t) = Taken By, (c) = Cosigned By    Initials Name Provider Type    Sandy Altman, PT Physical Therapist          Goals  PT OP Goals     Row Name 03/29/21 1515          PT Short Term Goals    STG 1  Pt will verbalize s/sx of infection.  -     STG 1 Progress  Met  -     STG 2  Pt will demonstrate 25% reduction in wound area to indicate healing progress.  -     STG 2 Progress  Met  -        Long Term Goals    LTG 1  Pt/caregiver will demonstrate independence with clean home dressing changes.  -     LTG 1 Progress  Met  -     LTG 2  Pt will demonstrate no remaining open area to indicate healing progress and allow transition to compression stockings.  -     LTG 2 Progress  Met  -     LTG 3  Pt will demonstrate only mild/trace BLE edema to indicate appropriate edema management.  -     LTG 3 Progress  Met  -        Time Calculation    PT Goal Re-Cert Due Date  05/20/21  -       User Key  (r) = Recorded By, (t) = Taken By, (c) = Cosigned By    Initials Name Provider Type    Sandy Altman, PT Physical Therapist        Time Calculation: Start Time: 1515  Total  Timed Code Minutes- PT: 10 minute(s)  Therapy Charges for Today     Code Description Service Date Service Provider Modifiers Qty    74742338627 HC NONSELECTIVE DEBRIDEMENT 3/29/2021 Sandy Stokes, PT GP 1    25365909578 HC PT SELF CARE/MGMT/TRAIN EA 15 MIN 3/29/2021 Sandy Stokes, PT GP 1                  Sandy Stokes, PT  3/29/2021

## 2021-04-30 ENCOUNTER — DOCUMENTATION (OUTPATIENT)
Dept: PHYSICAL THERAPY | Facility: HOSPITAL | Age: 86
End: 2021-04-30

## 2021-04-30 DIAGNOSIS — R60.0 BILATERAL LOWER EXTREMITY EDEMA: ICD-10-CM

## 2021-04-30 DIAGNOSIS — I87.8 VENOUS STASIS OF BOTH LOWER EXTREMITIES: ICD-10-CM

## 2021-04-30 DIAGNOSIS — S81.801D OPEN WOUND OF RIGHT LOWER EXTREMITY, SUBSEQUENT ENCOUNTER: Primary | ICD-10-CM

## 2021-04-30 NOTE — THERAPY DISCHARGE NOTE
Outpatient Rehabilitation - Wound/Debridement Discharge Summary       Patient Name: Jesus Montiel Jr.  : 1934  MRN: [id number]  Today's Date: 2021                  Admit Date: (Not on file)    Visit Dx:    ICD-10-CM ICD-9-CM   1. Open wound of right lower extremity, subsequent encounter  S81.801D V58.89     891.0   2. Venous stasis of both lower extremities  I87.8 459.81   3. Bilateral lower extremity edema  R60.0 782.3       Patient Active Problem List   Diagnosis   • Cellulitis of right leg   • Failure of outpatient treatment   • Hypertension   • Hyperlipidemia   • A-fib (CMS/HCC)        Past Medical History:   Diagnosis Date   • Hyperlipidemia    • Hypertension    • Meniere disease    • Myocardial infarction (CMS/HCC)    • Tremor         Past Surgical History:   Procedure Laterality Date   • ABLATION OF DYSRHYTHMIC FOCUS     • CATARACT EXTRACTION, BILATERAL     • CORONARY ANGIOPLASTY WITH STENT PLACEMENT     • CORONARY ARTERY BYPASS BRING BACK FOR EXPLORATION     • HERNIA REPAIR     • INNER EAR SURGERY     • PACEMAKER IMPLANTATION       Goals  PT OP Goals     Row Name 21 1000          PT Short Term Goals    STG 1  Pt will verbalize s/sx of infection.  -     STG 1 Progress  Met  -     STG 2  Pt will demonstrate 25% reduction in wound area to indicate healing progress.  -     STG 2 Progress  Met  -        Long Term Goals    LTG 1  Pt/caregiver will demonstrate independence with clean home dressing changes.  -     LTG 1 Progress  Met  -     LTG 2  Pt will demonstrate no remaining open area to indicate healing progress and allow transition to compression stockings.  -     LTG 2 Progress  Met  -     LTG 3  Pt will demonstrate only mild/trace BLE edema to indicate appropriate edema management.  -     LTG 3 Progress  Met  -       User Key  (r) = Recorded By, (t) = Taken By, (c) = Cosigned By    Initials Name Provider Type    Sandy Altman, PT Physical Therapist           OP Discharge Summary     Row Name 04/30/21 1024             OP PT Discharge Summary    Date of Discharge  04/30/21  -DIANE      Reason for Discharge  All goals achieved;Independent  -JM      Outcomes Achieved  Able to achieve all goals within established timeline  -      Discharge Destination  Home without follow-up  -        User Key  (r) = Recorded By, (t) = Taken By, (c) = Cosigned By    Initials Name Provider Type    Sandy Altman, PT Physical Therapist          Sandy Stokes, PT  4/30/2021

## 2021-06-22 ENCOUNTER — CONSULT (OUTPATIENT)
Dept: ONCOLOGY | Facility: CLINIC | Age: 86
End: 2021-06-22

## 2021-06-22 ENCOUNTER — LAB (OUTPATIENT)
Dept: LAB | Facility: HOSPITAL | Age: 86
End: 2021-06-22

## 2021-06-22 VITALS
WEIGHT: 147 LBS | RESPIRATION RATE: 18 BRPM | SYSTOLIC BLOOD PRESSURE: 122 MMHG | BODY MASS INDEX: 23.07 KG/M2 | TEMPERATURE: 98 F | OXYGEN SATURATION: 97 % | HEIGHT: 67 IN | HEART RATE: 74 BPM | DIASTOLIC BLOOD PRESSURE: 60 MMHG

## 2021-06-22 DIAGNOSIS — D69.6 THROMBOCYTOPENIA (HCC): ICD-10-CM

## 2021-06-22 DIAGNOSIS — D69.6 THROMBOCYTOPENIA (HCC): Primary | ICD-10-CM

## 2021-06-22 PROCEDURE — 99204 OFFICE O/P NEW MOD 45 MIN: CPT | Performed by: INTERNAL MEDICINE

## 2021-06-22 RX ORDER — PROPRANOLOL HYDROCHLORIDE 20 MG/1
20 TABLET ORAL DAILY
COMMUNITY

## 2021-06-22 RX ORDER — FUROSEMIDE 20 MG/1
20 TABLET ORAL DAILY
COMMUNITY

## 2021-06-22 RX ORDER — TESTOSTERONE 20.25 MG/1.25G
GEL TOPICAL DAILY
COMMUNITY
End: 2022-11-02

## 2021-06-22 NOTE — PROGRESS NOTES
New Patient Office Visit      Date: 2021     Patient Name: Jesus Montiel Jr.  MRN: [id number]  : 1934  Referring Physician: Aleida Gutierrez    Chief Complaint: Establish care for thrombocytopenia    History of Present Illness: Jesus Montiel Jr. is a pleasant 87 y.o. male with past medical history of Ménière's disease, hyperlipidemia, atrial fibrillation, low testosterone who presents today for evaluation of thrombocytopenia. The patient is accompanied by their daughter who contributes to the history of their care.  Patient was recently seen by his PCP who checked a CBC which was notable for platelet count of 98K.  Per chart review patient has had a stable thrombocytopenia since at least 2015.  Platelet count has ranged from 94K-140K.  He has been on a blood thinner for atrial fibrillation during this timeframe.  He does note some easy bruising but denies any significant easy bleeding.  Does have the occasional nosebleed but resolves with pressure.  During this time, his WBC and hemoglobin have remained stable.  He recently had a CT scan of his abdomen/pelvis for abdominal pain and constipation which not reveal an enlarged liver or spleen.  He denies any other new significant medication changes otherwise feels well with no major issues    Oncology History:    Oncology/Hematology History    No history exists.       Subjective      Review of Systems:     Constitutional: Negative for fevers, chills, or weight loss  Eyes: Negative for blurred vision or discharge         Ear/Nose/Throat: Negative for difficulty swallowing, sore throat, LAD                                                       Respiratory: Negative for cough, SOA, wheezing                                                                                        Cardiovascular: Negative for chest pain or palpitations                                                                  Gastrointestinal: Negative for nausea, vomiting or  diarrhea                                                                     Genitourinary: Negative for dysuria or hematuria                                                                                           Musculoskeletal: Negative for any joint pains or muscle aches                                                                        Neurologic: Negative for any weakness, headaches, dizziness                                                                         Hematologic: Negative for any easy bleeding or bruising                                                                                   Psychiatric: Negative for anxiety or depression                             Past Medical History:   Past Medical History:   Diagnosis Date   • Hyperlipidemia    • Hypertension    • Meniere disease    • Myocardial infarction (CMS/HCC)    • Tremor        Past Surgical History:   Past Surgical History:   Procedure Laterality Date   • ABLATION OF DYSRHYTHMIC FOCUS     • CATARACT EXTRACTION, BILATERAL     • CORONARY ANGIOPLASTY WITH STENT PLACEMENT     • CORONARY ARTERY BYPASS BRING BACK FOR EXPLORATION     • HERNIA REPAIR     • INNER EAR SURGERY     • PACEMAKER IMPLANTATION         Family History: History reviewed. No pertinent family history.    Social History:   Social History     Socioeconomic History   • Marital status:      Spouse name: Not on file   • Number of children: Not on file   • Years of education: Not on file   • Highest education level: Not on file   Tobacco Use   • Smoking status: Never Smoker   • Smokeless tobacco: Never Used   Substance and Sexual Activity   • Alcohol use: Yes     Alcohol/week: 4.0 standard drinks     Types: 4 Glasses of wine per week   • Drug use: No   • Sexual activity: Defer       Medications:     Current Outpatient Medications:   •  atorvastatin (LIPITOR) 20 MG tablet, Take 20 mg by mouth Daily., Disp: , Rfl:   •  CALCIUM PO, Take  by mouth., Disp: , Rfl:   •   "Cholecalciferol (VITAMIN D3) 5000 units capsule capsule, Take 7,000 Units by mouth 1 (One) Time Per Week., Disp: , Rfl:   •  coenzyme Q10 100 MG capsule, Take 100 mg by mouth Daily., Disp: , Rfl:   •  dabigatran etexilate (PRADAXA) 150 MG capsu, Take 150 mg by mouth 2 (Two) Times a Day., Disp: , Rfl:   •  Docusate Calcium (STOOL SOFTENER PO), Take  by mouth Every Other Day., Disp: , Rfl:   •  furosemide (LASIX) 20 MG tablet, Take 20 mg by mouth Daily., Disp: , Rfl:   •  Multiple Vitamins-Minerals (MULTIVITAMIN ADULTS PO), Take  by mouth., Disp: , Rfl:   •  propranolol (INDERAL) 20 MG tablet, Take 20 mg by mouth Daily., Disp: , Rfl:   •  Pyridoxine HCl (VITAMIN B-6 PO), Take 100 mg by mouth., Disp: , Rfl:   •  rivastigmine (EXELON) 9.5 MG/24HR patch, Place 1 patch on the skin as directed by provider Daily., Disp: , Rfl:   •  Testosterone 1.62 % gel, Place  on the skin as directed by provider Daily. 1 squirt daily, Disp: , Rfl:   •  vitamin B-12 (CYANOCOBALAMIN) 500 MCG tablet, Take  by mouth Daily., Disp: , Rfl:   •  Zinc 25 MG tablet, Take  by mouth., Disp: , Rfl:   •  albuterol sulfate  (90 Base) MCG/ACT inhaler, Inhale 2 puffs Every 6 (Six) Hours As Needed for Wheezing., Disp: , Rfl:   •  saccharomyces boulardii (Florastor) 250 MG capsule, Take 1 capsule by mouth 2 (Two) Times a Day., Disp: 60 capsule, Rfl: 0    Allergies:   No Known Allergies    Objective     Physical Exam:  Vital Signs:   Vitals:    06/22/21 1518   BP: 122/60   Pulse: 74   Resp: 18   Temp: 98 °F (36.7 °C)   SpO2: 97%   Weight: 66.7 kg (147 lb)   Height: 170.2 cm (67\")   PainSc: 0-No pain     Pain Score    06/22/21 1518   PainSc: 0-No pain     ECOG Performance Status: 1 - Symptomatic but completely ambulatory    Constitutional: NAD, ECOG 1  Eyes: PERRLA, scleral anicteric  ENT: No LAD, no thyromegaly  Respiratory: CTAB, no wheezing, rales, rhonchi  Cardiovascular: RRR, no murmurs, pulses 2+ bilaterally  Abdomen: soft, NT/ND, no " HSM  Musculoskeletal: strength 5/5 bilaterally, no c/c/e  Neurologic: A&O x 3, CN II-XII intact grossly  Psych: mood and affect congruent, no SI or HI    Results Review:   No visits with results within 2 Week(s) from this visit.   Latest known visit with results is:   Admission on 02/16/2021, Discharged on 02/17/2021   Component Date Value Ref Range Status   • QT Interval 02/16/2021 440  ms Final   • QTC Interval 02/16/2021 478  ms Final   • WBC 02/16/2021 5.52  3.40 - 10.80 10*3/mm3 Final   • RBC 02/16/2021 4.37  4.14 - 5.80 10*6/mm3 Final   • Hemoglobin 02/16/2021 13.2  13.0 - 17.7 g/dL Final   • Hematocrit 02/16/2021 41.1  37.5 - 51.0 % Final   • MCV 02/16/2021 94.1  79.0 - 97.0 fL Final   • MCH 02/16/2021 30.2  26.6 - 33.0 pg Final   • MCHC 02/16/2021 32.1  31.5 - 35.7 g/dL Final   • RDW 02/16/2021 14.0  12.3 - 15.4 % Final   • RDW-SD 02/16/2021 48.6  37.0 - 54.0 fl Final   • MPV 02/16/2021 11.4  6.0 - 12.0 fL Final   • Platelets 02/16/2021 122* 140 - 450 10*3/mm3 Final   • Neutrophil % 02/16/2021 53.5  42.7 - 76.0 % Final   • Lymphocyte % 02/16/2021 29.5  19.6 - 45.3 % Final   • Monocyte % 02/16/2021 15.0* 5.0 - 12.0 % Final   • Eosinophil % 02/16/2021 1.3  0.3 - 6.2 % Final   • Basophil % 02/16/2021 0.5  0.0 - 1.5 % Final   • Immature Grans % 02/16/2021 0.2  0.0 - 0.5 % Final   • Neutrophils, Absolute 02/16/2021 2.95  1.70 - 7.00 10*3/mm3 Final   • Lymphocytes, Absolute 02/16/2021 1.63  0.70 - 3.10 10*3/mm3 Final   • Monocytes, Absolute 02/16/2021 0.83  0.10 - 0.90 10*3/mm3 Final   • Eosinophils, Absolute 02/16/2021 0.07  0.00 - 0.40 10*3/mm3 Final   • Basophils, Absolute 02/16/2021 0.03  0.00 - 0.20 10*3/mm3 Final   • Immature Grans, Absolute 02/16/2021 0.01  0.00 - 0.05 10*3/mm3 Final   • nRBC 02/16/2021 0.0  0.0 - 0.2 /100 WBC Final   • Glucose 02/16/2021 97  65 - 99 mg/dL Final   • BUN 02/16/2021 24* 8 - 23 mg/dL Final   • Creatinine 02/16/2021 1.04  0.76 - 1.27 mg/dL Final   • Sodium 02/16/2021 146* 136 -  145 mmol/L Final   • Potassium 02/16/2021 4.2  3.5 - 5.2 mmol/L Final   • Chloride 02/16/2021 108* 98 - 107 mmol/L Final   • CO2 02/16/2021 30.0* 22.0 - 29.0 mmol/L Final   • Calcium 02/16/2021 9.1  8.6 - 10.5 mg/dL Final   • Total Protein 02/16/2021 6.5  6.0 - 8.5 g/dL Final   • Albumin 02/16/2021 3.80  3.50 - 5.20 g/dL Final   • ALT (SGPT) 02/16/2021 17  1 - 41 U/L Final   • AST (SGOT) 02/16/2021 24  1 - 40 U/L Final   • Alkaline Phosphatase 02/16/2021 82  39 - 117 U/L Final   • Total Bilirubin 02/16/2021 0.4  0.0 - 1.2 mg/dL Final   • eGFR Non  Amer 02/16/2021 68  >60 mL/min/1.73 Final   • Globulin 02/16/2021 2.7  gm/dL Final   • A/G Ratio 02/16/2021 1.4  g/dL Final   • BUN/Creatinine Ratio 02/16/2021 23.1  7.0 - 25.0 Final   • Anion Gap 02/16/2021 8.0  5.0 - 15.0 mmol/L Final   • Extra Tube 02/16/2021 hold for add-on   Final    Auto resulted   • Extra Tube 02/16/2021 Hold for add-ons.   Final    Auto resulted.   • Extra Tube 02/16/2021 hold for add-on   Final    Auto resulted   • Extra Tube 02/16/2021 Hold for add-ons.   Final    Auto resulted.   • Extra Tube 02/16/2021 Hold for add-ons.   Final    Auto resulted.   • Procalcitonin 02/16/2021 0.04  0.00 - 0.25 ng/mL Final   • Magnesium 02/16/2021 2.0  1.6 - 2.4 mg/dL Final   • TSH 02/16/2021 2.650  0.270 - 4.200 uIU/mL Final   • D-Dimer, Quantitative 02/16/2021 0.51  0.00 - 0.56 MCGFEU/mL Final   • proBNP 02/16/2021 603.9  0.0-1,800.0 pg/mL Final       No results found.    Assessment / Plan      Assessment/Plan:   1. Thrombocytopenia (CMS/HCC) (Primary)  -Unclear etiology at this time.  Likely secondary to ITP versus medication  -We will check labs as below to rule out other secondary causes  -Low concern for MDS at this time given his stability over the past 6 years as well as stable hemoglobin and WBC  -No indication for bone marrow biopsy at this time  -Okay to transfuse platelets if patient should require these in an emergent situation  -     Vitamin  B12; Future  -     Folate; Future  -     Lactate Dehydrogenase; Future  -     CBC & Differential; Future  -     Comprehensive Metabolic Panel; Future  -     Haptoglobin; Future  -     Peripheral Blood Smear; Future  -     HIV-1 / O / 2 Ag / Antibody 4th Generation; Future           Follow Up:   Follow-up in 2-3 weeks     Eladio Amaro MD  Hematology and Oncology     Please note that portions of this note may have been completed with a voice recognition program. Efforts were made to edit the dictations, but occasionally words are mistranscribed.

## 2021-07-02 ENCOUNTER — LAB (OUTPATIENT)
Dept: LAB | Facility: HOSPITAL | Age: 86
End: 2021-07-02

## 2021-07-02 LAB
ALBUMIN SERPL-MCNC: 3.9 G/DL (ref 3.5–5.2)
ALBUMIN/GLOB SERPL: 1.4 G/DL
ALP SERPL-CCNC: 75 U/L (ref 39–117)
ALT SERPL W P-5'-P-CCNC: 17 U/L (ref 1–41)
ANION GAP SERPL CALCULATED.3IONS-SCNC: 7 MMOL/L (ref 5–15)
AST SERPL-CCNC: 26 U/L (ref 1–40)
BASOPHILS # BLD AUTO: 0.03 10*3/MM3 (ref 0–0.2)
BASOPHILS NFR BLD AUTO: 0.7 % (ref 0–1.5)
BILIRUB SERPL-MCNC: 0.6 MG/DL (ref 0–1.2)
BUN SERPL-MCNC: 19 MG/DL (ref 8–23)
BUN/CREAT SERPL: 21.1 (ref 7–25)
CALCIUM SPEC-SCNC: 9.2 MG/DL (ref 8.6–10.5)
CHLORIDE SERPL-SCNC: 104 MMOL/L (ref 98–107)
CO2 SERPL-SCNC: 28 MMOL/L (ref 22–29)
CREAT SERPL-MCNC: 0.9 MG/DL (ref 0.76–1.27)
DEPRECATED RDW RBC AUTO: 52.4 FL (ref 37–54)
EOSINOPHIL # BLD AUTO: 0.04 10*3/MM3 (ref 0–0.4)
EOSINOPHIL NFR BLD AUTO: 0.9 % (ref 0.3–6.2)
ERYTHROCYTE [DISTWIDTH] IN BLOOD BY AUTOMATED COUNT: 14.9 % (ref 12.3–15.4)
FOLATE SERPL-MCNC: >20 NG/ML (ref 4.78–24.2)
GFR SERPL CREATININE-BSD FRML MDRD: 80 ML/MIN/1.73
GLOBULIN UR ELPH-MCNC: 2.7 GM/DL
GLUCOSE SERPL-MCNC: 114 MG/DL (ref 65–99)
HAPTOGLOB SERPL-MCNC: 102 MG/DL (ref 30–200)
HCT VFR BLD AUTO: 40.6 % (ref 37.5–51)
HGB BLD-MCNC: 13 G/DL (ref 13–17.7)
HIV1+2 AB SER QL: NORMAL
IMM GRANULOCYTES # BLD AUTO: 0.01 10*3/MM3 (ref 0–0.05)
IMM GRANULOCYTES NFR BLD AUTO: 0.2 % (ref 0–0.5)
LDH SERPL-CCNC: 247 U/L (ref 135–225)
LYMPHOCYTES # BLD AUTO: 1.25 10*3/MM3 (ref 0.7–3.1)
LYMPHOCYTES NFR BLD AUTO: 27.2 % (ref 19.6–45.3)
MCH RBC QN AUTO: 30.9 PG (ref 26.6–33)
MCHC RBC AUTO-ENTMCNC: 32 G/DL (ref 31.5–35.7)
MCV RBC AUTO: 96.4 FL (ref 79–97)
MONOCYTES # BLD AUTO: 0.62 10*3/MM3 (ref 0.1–0.9)
MONOCYTES NFR BLD AUTO: 13.5 % (ref 5–12)
NEUTROPHILS NFR BLD AUTO: 2.64 10*3/MM3 (ref 1.7–7)
NEUTROPHILS NFR BLD AUTO: 57.5 % (ref 42.7–76)
NRBC BLD AUTO-RTO: 0 /100 WBC (ref 0–0.2)
PLATELET # BLD AUTO: 120 10*3/MM3 (ref 140–450)
PMV BLD AUTO: 11.6 FL (ref 6–12)
POTASSIUM SERPL-SCNC: 4.3 MMOL/L (ref 3.5–5.2)
PROT SERPL-MCNC: 6.6 G/DL (ref 6–8.5)
RBC # BLD AUTO: 4.21 10*6/MM3 (ref 4.14–5.8)
SODIUM SERPL-SCNC: 139 MMOL/L (ref 136–145)
VIT B12 BLD-MCNC: 980 PG/ML (ref 211–946)
WBC # BLD AUTO: 4.59 10*3/MM3 (ref 3.4–10.8)

## 2021-07-02 PROCEDURE — 85060 BLOOD SMEAR INTERPRETATION: CPT

## 2021-07-02 PROCEDURE — 85025 COMPLETE CBC W/AUTO DIFF WBC: CPT

## 2021-07-02 PROCEDURE — 83010 ASSAY OF HAPTOGLOBIN QUANT: CPT

## 2021-07-02 PROCEDURE — G0432 EIA HIV-1/HIV-2 SCREEN: HCPCS

## 2021-07-02 PROCEDURE — 83615 LACTATE (LD) (LDH) ENZYME: CPT

## 2021-07-02 PROCEDURE — 80053 COMPREHEN METABOLIC PANEL: CPT

## 2021-07-02 PROCEDURE — 82746 ASSAY OF FOLIC ACID SERUM: CPT

## 2021-07-02 PROCEDURE — 82607 VITAMIN B-12: CPT

## 2021-07-02 PROCEDURE — 36415 COLL VENOUS BLD VENIPUNCTURE: CPT

## 2021-07-03 LAB
CYTOLOGIST CVX/VAG CYTO: NORMAL
PATH INTERP BLD-IMP: NORMAL

## 2021-07-12 ENCOUNTER — TELEPHONE (OUTPATIENT)
Dept: ONCOLOGY | Facility: CLINIC | Age: 86
End: 2021-07-12

## 2021-07-12 NOTE — TELEPHONE ENCOUNTER
Caller: MARILEE BAUTISTA    Relationship to patient: SAMARA    Best call back number: [phone]    Chief complaint: PT REQUESTS 7/13 APPT TO BE TELEHEALTH    Type of visit:FU    Requested date: 7/13    If rescheduling, when is the original appointment:     Additional notes:

## 2021-07-13 ENCOUNTER — OFFICE VISIT (OUTPATIENT)
Dept: ONCOLOGY | Facility: CLINIC | Age: 86
End: 2021-07-13

## 2021-07-13 VITALS — BODY MASS INDEX: 23.02 KG/M2 | HEIGHT: 67 IN

## 2021-07-13 DIAGNOSIS — D69.6 THROMBOCYTOPENIA (HCC): Primary | ICD-10-CM

## 2021-07-13 PROCEDURE — 99214 OFFICE O/P EST MOD 30 MIN: CPT | Performed by: INTERNAL MEDICINE

## 2021-07-13 RX ORDER — AMMONIUM LACTATE 12 G/100G
CREAM TOPICAL
COMMUNITY
Start: 2021-06-22 | End: 2022-11-02

## 2021-07-13 NOTE — PROGRESS NOTES
TELEMEDICINE FOLLOW-UP NOTE    07/13/2021    Problem List Items Addressed This Visit        Other    Thrombocytopenia (CMS/HCC) - Primary    Relevant Orders    CBC & Differential          Time Devoted to Visit: 30 min including time to review his previous imaging and records, with 50% devoted to direct discussion.    Reason for Visit:  I personally contacted Jesus Montiel Jr.  today in an effort to screen for coronavirus symptoms and also to perform their scheduled follow-up visit remotely in light of the coronavirus pandemic.  With respect to their specific risks for coronavirus, their screen is as follows:      COVID-19 RISK SCREEN     1. Has the patient had close contact without PPE with a lab confirmed COVID-19 (+) person or a person under investigation (PUI) for COVID-19 infection?  -- No     2. Has the patient had respiratory symptoms, worsened/new cough and/or SOA, unexplained fever, or sudden loss of smell and/or taste in the past 7 days? --  No    3. Does the patient have baseline higher exposure risk such as working in healthcare field or currently residing in healthcare facility?  --  No       They also denied any new respiratory symptoms or fever within the past 14 days.    I counseled them regarding safe practices for minimizing risk for infection including hand-washing, self-isolation, limiting contacts, and we also discussed what to do should they develop symptoms including fever, chills, new cough, shortness of breath, or new body aches.    SUBJECTIVE:  Referring Physician: Aleida Gutierrez     Chief Complaint: Establish care for thrombocytopenia     History of Present Illness: Jesus Montiel Jr. is a pleasant 87 y.o. male with past medical history of Ménière's disease, hyperlipidemia, atrial fibrillation, low testosterone who presents today for evaluation of thrombocytopenia. The patient is accompanied by their daughter who contributes to the history of their care.  Patient was recently seen by his  PCP who checked a CBC which was notable for platelet count of 98K.  Per chart review patient has had a stable thrombocytopenia since at least January 2015.  Platelet count has ranged from 94K-140K.  He has been on a blood thinner for atrial fibrillation during this timeframe.  He does note some easy bruising but denies any significant easy bleeding.  Does have the occasional nosebleed but resolves with pressure.  During this time, his WBC and hemoglobin have remained stable.  He recently had a CT scan of his abdomen/pelvis for abdominal pain and constipation which not reveal an enlarged liver or spleen.  He denies any other new significant medication changes otherwise feels well with no major issues    Interval History:  Presents to clinic via telephone visit.  Overall doing well at this time.  Remains compliant with his home medications.  Denies any excessive bleeding or bruising today.  Denies any new rashes    ROS: A 10 point review of systems was complete is otherwise negative except as above    PMH/PSH/SH/FH: Reviewed and unchanged since June 2021    EXAM FINDINGS AND/OR PROBLEMS:  General: Conversant with phone, no acute distress  Neuro: Alert and orient x3  Psych: Mood congruent, denies any SI/HI    ASSESSMENT/PLAN:   1. Thrombocytopenia (CMS/HCC) (Primary)  -Unclear etiology at this time.  Likely secondary to ITP versus medication  -Repeat platelet count 120K in July 2021  -LDH mildly elevated, haptoglobin within normal limits indicating no hemolysis  -Vitamin B12 and folate within normal limits  -HIV negative  -Peripheral smear with no platelet clumping or immature cells or blast  -Low concern for MDS at this time given his stability over the past 6 years as well as stable hemoglobin and WBC  -No indication for bone marrow biopsy at this time  -Okay to transfuse platelets if patient should require these in an emergent situation  -We will plan to monitor platelet count yearly      RECOMMENDATIONS:  Follow-up  in 1 year    Eladio Amaro MD

## 2021-07-20 ENCOUNTER — TELEPHONE (OUTPATIENT)
Dept: ONCOLOGY | Facility: CLINIC | Age: 86
End: 2021-07-20

## 2021-07-20 NOTE — TELEPHONE ENCOUNTER
Caller: MARILEE     Relationship to patient: DAUGHTER    Best call back number: [phone]    Chief complaint: CANCEL APPT    Type of visit: ONE YR F/U     Requested date: NONE    If rescheduling, when is the original appointment: 7/13/22    Additional notes: WILL CL TO SCHEDULE IF NEEDED BUT DOES NOT WANT TO RESCHEDULE AT THIS TIME

## 2022-03-02 ENCOUNTER — APPOINTMENT (OUTPATIENT)
Dept: GENERAL RADIOLOGY | Facility: HOSPITAL | Age: 87
End: 2022-03-02

## 2022-03-02 ENCOUNTER — HOSPITAL ENCOUNTER (EMERGENCY)
Facility: HOSPITAL | Age: 87
Discharge: NURSING, REHAB OR LONG-TERM CARE | End: 2022-03-02
Attending: EMERGENCY MEDICINE | Admitting: EMERGENCY MEDICINE

## 2022-03-02 VITALS
HEART RATE: 70 BPM | RESPIRATION RATE: 15 BRPM | HEIGHT: 67 IN | DIASTOLIC BLOOD PRESSURE: 66 MMHG | TEMPERATURE: 97.9 F | BODY MASS INDEX: 21.19 KG/M2 | OXYGEN SATURATION: 98 % | WEIGHT: 135 LBS | SYSTOLIC BLOOD PRESSURE: 123 MMHG

## 2022-03-02 DIAGNOSIS — R60.0 LOWER LEG EDEMA: Primary | ICD-10-CM

## 2022-03-02 DIAGNOSIS — I87.2 VENOUS STASIS DERMATITIS OF BOTH LOWER EXTREMITIES: ICD-10-CM

## 2022-03-02 LAB
ALBUMIN SERPL-MCNC: 3.7 G/DL (ref 3.5–5.2)
ALBUMIN/GLOB SERPL: 1.4 G/DL
ALP SERPL-CCNC: 74 U/L (ref 39–117)
ALT SERPL W P-5'-P-CCNC: 20 U/L (ref 1–41)
ANION GAP SERPL CALCULATED.3IONS-SCNC: 11 MMOL/L (ref 5–15)
AST SERPL-CCNC: 28 U/L (ref 1–40)
BASOPHILS # BLD AUTO: 0.01 10*3/MM3 (ref 0–0.2)
BASOPHILS NFR BLD AUTO: 0.3 % (ref 0–1.5)
BILIRUB SERPL-MCNC: 0.6 MG/DL (ref 0–1.2)
BUN SERPL-MCNC: 22 MG/DL (ref 8–23)
BUN/CREAT SERPL: 23.4 (ref 7–25)
CALCIUM SPEC-SCNC: 8.7 MG/DL (ref 8.6–10.5)
CHLORIDE SERPL-SCNC: 103 MMOL/L (ref 98–107)
CO2 SERPL-SCNC: 29 MMOL/L (ref 22–29)
CREAT SERPL-MCNC: 0.94 MG/DL (ref 0.76–1.27)
DEPRECATED RDW RBC AUTO: 45.4 FL (ref 37–54)
EGFRCR SERPLBLD CKD-EPI 2021: 78 ML/MIN/1.73
EOSINOPHIL # BLD AUTO: 0.05 10*3/MM3 (ref 0–0.4)
EOSINOPHIL NFR BLD AUTO: 1.4 % (ref 0.3–6.2)
ERYTHROCYTE [DISTWIDTH] IN BLOOD BY AUTOMATED COUNT: 13.1 % (ref 12.3–15.4)
GLOBULIN UR ELPH-MCNC: 2.7 GM/DL
GLUCOSE SERPL-MCNC: 94 MG/DL (ref 65–99)
HCT VFR BLD AUTO: 41.9 % (ref 37.5–51)
HGB BLD-MCNC: 14 G/DL (ref 13–17.7)
HOLD SPECIMEN: NORMAL
IMM GRANULOCYTES # BLD AUTO: 0.01 10*3/MM3 (ref 0–0.05)
IMM GRANULOCYTES NFR BLD AUTO: 0.3 % (ref 0–0.5)
LYMPHOCYTES # BLD AUTO: 1.07 10*3/MM3 (ref 0.7–3.1)
LYMPHOCYTES NFR BLD AUTO: 30.3 % (ref 19.6–45.3)
MCH RBC QN AUTO: 31.6 PG (ref 26.6–33)
MCHC RBC AUTO-ENTMCNC: 33.4 G/DL (ref 31.5–35.7)
MCV RBC AUTO: 94.6 FL (ref 79–97)
MONOCYTES # BLD AUTO: 0.47 10*3/MM3 (ref 0.1–0.9)
MONOCYTES NFR BLD AUTO: 13.3 % (ref 5–12)
NEUTROPHILS NFR BLD AUTO: 1.92 10*3/MM3 (ref 1.7–7)
NEUTROPHILS NFR BLD AUTO: 54.4 % (ref 42.7–76)
NRBC BLD AUTO-RTO: 0 /100 WBC (ref 0–0.2)
NT-PROBNP SERPL-MCNC: 455.4 PG/ML (ref 0–1800)
PLATELET # BLD AUTO: 77 10*3/MM3 (ref 140–450)
PMV BLD AUTO: 11.4 FL (ref 6–12)
POTASSIUM SERPL-SCNC: 3.9 MMOL/L (ref 3.5–5.2)
PROT SERPL-MCNC: 6.4 G/DL (ref 6–8.5)
RBC # BLD AUTO: 4.43 10*6/MM3 (ref 4.14–5.8)
SODIUM SERPL-SCNC: 143 MMOL/L (ref 136–145)
TROPONIN T SERPL-MCNC: 0.01 NG/ML (ref 0–0.03)
WBC NRBC COR # BLD: 3.53 10*3/MM3 (ref 3.4–10.8)
WHOLE BLOOD HOLD SPECIMEN: NORMAL
WHOLE BLOOD HOLD SPECIMEN: NORMAL

## 2022-03-02 PROCEDURE — 84484 ASSAY OF TROPONIN QUANT: CPT | Performed by: PHYSICIAN ASSISTANT

## 2022-03-02 PROCEDURE — 85025 COMPLETE CBC W/AUTO DIFF WBC: CPT | Performed by: PHYSICIAN ASSISTANT

## 2022-03-02 PROCEDURE — 99283 EMERGENCY DEPT VISIT LOW MDM: CPT

## 2022-03-02 PROCEDURE — 71045 X-RAY EXAM CHEST 1 VIEW: CPT

## 2022-03-02 PROCEDURE — 83880 ASSAY OF NATRIURETIC PEPTIDE: CPT | Performed by: PHYSICIAN ASSISTANT

## 2022-03-02 PROCEDURE — 80053 COMPREHEN METABOLIC PANEL: CPT | Performed by: PHYSICIAN ASSISTANT

## 2022-03-02 RX ORDER — SODIUM CHLORIDE 0.9 % (FLUSH) 0.9 %
10 SYRINGE (ML) INJECTION AS NEEDED
Status: DISCONTINUED | OUTPATIENT
Start: 2022-03-02 | End: 2022-03-02 | Stop reason: HOSPADM

## 2022-03-02 RX ORDER — CEPHALEXIN 500 MG/1
500 CAPSULE ORAL 3 TIMES DAILY
Qty: 30 CAPSULE | Refills: 0 | Status: SHIPPED | OUTPATIENT
Start: 2022-03-02 | End: 2022-11-02

## 2022-03-02 NOTE — ED PROVIDER NOTES
Subjective   Mr. Montiel is an 89 yo male who was sent to the ER by his nurse at his assisted living facility for further evaluation of bilateral lower leg redness and swelling.  The pt states he was diagnosed with COVID last week.  He states he has been doing fairly well, with no shortness of breath, chest pain, fever, vomiting or diarrhea.  He has had a mild non-productive cough.  The pt states he thinks his legs have become swollen as a result of being confined to bed while having COVID.  Past medical history includes HTN, MI, CABG, MI, cardiac ablation and pacemaker.  He is a non-smoker.          Review of Systems   Constitutional: Positive for fatigue. Negative for appetite change, chills and fever.   HENT: Negative for sore throat.    Respiratory: Positive for cough. Negative for shortness of breath and wheezing.    Cardiovascular: Negative for chest pain and palpitations.   Gastrointestinal: Negative for abdominal pain, blood in stool, constipation, diarrhea, nausea and vomiting.   Genitourinary: Negative for dysuria.   Musculoskeletal: Negative for back pain.   Skin: Negative for pallor and rash.   Allergic/Immunologic: Negative for immunocompromised state.   Neurological: Positive for weakness (generalized). Negative for dizziness, light-headedness and headaches.   Hematological: Negative.    Psychiatric/Behavioral: Negative.        Past Medical History:   Diagnosis Date   • Hyperlipidemia    • Hypertension    • Meniere disease    • Myocardial infarction (CMS/HCC)    • Tremor        No Known Allergies    Past Surgical History:   Procedure Laterality Date   • ABLATION OF DYSRHYTHMIC FOCUS     • CATARACT EXTRACTION, BILATERAL     • CORONARY ANGIOPLASTY WITH STENT PLACEMENT     • CORONARY ARTERY BYPASS BRING BACK FOR EXPLORATION     • HERNIA REPAIR     • INNER EAR SURGERY     • PACEMAKER IMPLANTATION         No family history on file.    Social History     Socioeconomic History   • Marital status:     Tobacco Use   • Smoking status: Never Smoker   • Smokeless tobacco: Never Used   Substance and Sexual Activity   • Alcohol use: Yes     Alcohol/week: 4.0 standard drinks     Types: 4 Glasses of wine per week   • Drug use: No   • Sexual activity: Defer           Objective   Physical Exam  Constitutional:       General: He is not in acute distress.     Appearance: Normal appearance. He is not diaphoretic.   HENT:      Head: Normocephalic.      Nose: Nose normal.      Mouth/Throat:      Mouth: Mucous membranes are moist.   Eyes:      General: No scleral icterus.     Conjunctiva/sclera: Conjunctivae normal.      Pupils: Pupils are equal, round, and reactive to light.   Cardiovascular:      Rate and Rhythm: Normal rate and regular rhythm.      Pulses: Normal pulses.      Heart sounds: Normal heart sounds.   Pulmonary:      Effort: Pulmonary effort is normal. No respiratory distress.      Breath sounds: Normal breath sounds. No wheezing, rhonchi or rales.   Abdominal:      General: Bowel sounds are normal.      Tenderness: There is no abdominal tenderness. There is no guarding.   Musculoskeletal:         General: No tenderness. Normal range of motion.      Cervical back: Normal range of motion and neck supple.      Comments: 2+ bilateral lower leg edema and venous stasis dermatosis.  Mild tenderness on palpation.     Skin:     General: Skin is warm and dry.   Neurological:      General: No focal deficit present.      Mental Status: He is alert.   Psychiatric:         Mood and Affect: Mood normal.         Procedures       Differential dx includes bilateral lower leg cellulitis, chronic venous stasis dermatosis, CHF, etc.    ED Course      Labs are bland.  White count is a bit low at 3.53, consistent with his known COVID infection.  ProBNP is 455.  Nml troponin.      I think his bilateral lower extremity discoloration is secondary to chronic venous stasis dermatosis, although some degree of cellulitis is also possible.  I  will plan to d/c back home on Keflex and have him increase his Lasix to 40 mg daily for the next 2 days and then resume his 20mg daily dosing.  I got the pt up at bedside and he was able to stand and walk in place a bit.  I spoke with his daughter by phone.  She states his legs are always red and swollen and that she saw him today and feels this is fairly typical appearance.  She advised that he is typically able to ambulate and at times walks out to the road and back at the assisted living facility but that since he was diagnosed with COVID last week, he has been quarantined to his room and has not been able to get out any.  She states she is retired and can go over and spend days with him and put him to bed at night.  The staff is bringing his food up to his room.  She feels comfortable with him going back to the assisted living facility.                                                     MDM    Final diagnoses:   Lower leg edema   Venous stasis dermatitis of both lower extremities       ED Disposition  ED Disposition     ED Disposition Condition Comment    Discharge Stable           Aleida Gutierrez, NP  [address]  [place] [address]  [phone]      As needed    [facility] Emergency Department  [address] [address] [phone]    If symptoms worsen         Medication List      New Prescriptions    cephalexin 500 MG capsule  Commonly known as: KEFLEX  Take 1 capsule by mouth 3 (Three) Times a Day.           Where to Get Your Medications      These medications were sent to [facility]/pharmacy #[id number] - [place], [place] - [address] [facility] - [phone]  - [phone]   [address] [facility] [address]    Phone: [phone]   · cephalexin 500 MG capsule          Elier Ramirez PA  03/02/22 6103

## 2022-03-02 NOTE — DISCHARGE INSTRUCTIONS
Rest.  Elevate the legs and wear compression hose as much as possible.  Keflex as prescribed for lower leg cellulitis.  Continue your current meds.  Take an extra dose of Lasix for 1-2 days and then return to regular dosing.  Follow up or return to the ER if worse.

## 2022-07-22 ENCOUNTER — TRANSCRIBE ORDERS (OUTPATIENT)
Dept: PHYSICAL THERAPY | Facility: HOSPITAL | Age: 87
End: 2022-07-22

## 2022-07-22 DIAGNOSIS — S81.801D OPEN WOUND OF RIGHT LOWER EXTREMITY WITH COMPLICATION, SUBSEQUENT ENCOUNTER: Primary | ICD-10-CM

## 2022-07-22 DIAGNOSIS — S31.105D OPEN WOUND OF UMBILICAL REGION, SUBSEQUENT ENCOUNTER: ICD-10-CM

## 2022-08-01 ENCOUNTER — HOSPITAL ENCOUNTER (OUTPATIENT)
Dept: PHYSICAL THERAPY | Facility: HOSPITAL | Age: 87
Setting detail: THERAPIES SERIES
Discharge: HOME | End: 2022-08-01

## 2022-08-01 DIAGNOSIS — S81.801D OPEN WOUND OF RIGHT LOWER EXTREMITY, SUBSEQUENT ENCOUNTER: Primary | ICD-10-CM

## 2022-08-01 DIAGNOSIS — I87.8 VENOUS STASIS OF BOTH LOWER EXTREMITIES: ICD-10-CM

## 2022-08-01 PROCEDURE — 29580 STRAPPING UNNA BOOT: CPT

## 2022-08-01 PROCEDURE — 97161 PT EVAL LOW COMPLEX 20 MIN: CPT

## 2022-08-01 NOTE — THERAPY EVALUATION
"    Outpatient Rehabilitation - Wound/Debridement Initial Gurjit   [facility]     Patient Name: Jesus Montiel Jr.  : 1934  MRN: [id number]  Today's Date: 2022                  Admit Date: 2022    Visit Dx:    ICD-10-CM ICD-9-CM   1. Open wound of right lower extremity, subsequent encounter  S81.801D V58.89     891.0   2. Venous stasis of both lower extremities  I87.8 459.81       Patient Active Problem List   Diagnosis   • Cellulitis of right leg   • Failure of outpatient treatment   • Hypertension   • Hyperlipidemia   • A-fib (HCC)   • Thrombocytopenia (HCC)        Past Medical History:   Diagnosis Date   • Hyperlipidemia    • Hypertension    • Meniere disease    • Myocardial infarction (CMS/HCC)    • Tremor         Past Surgical History:   Procedure Laterality Date   • ABLATION OF DYSRHYTHMIC FOCUS     • CATARACT EXTRACTION, BILATERAL     • CORONARY ANGIOPLASTY WITH STENT PLACEMENT     • CORONARY ARTERY BYPASS BRING BACK FOR EXPLORATION     • HERNIA REPAIR     • INNER EAR SURGERY     • PACEMAKER IMPLANTATION          Patient History     Row Name 22 1400             History    Chief Complaint Ulcer, wound or other skin conditions;Pain;Swelling  -      Type of Pain Lower Extremity / Leg  -      Date Current Problem(s) Began 22  -      Brief Description of Current Complaint Pt known to PT from prior episodes for venous stasis ulcers.  Pt developed blister and open ulceration to RLE around 22.  Daughter, Mary Jo, has been bandaging wound with nonstick dressings and applying ace wrap for compression.  -              Pain     M[place] FACES Pain Rating  4  -      Pain Comments Pt reports \"tenderness\" of skin of medial R ankle  -              Daily Activities    Pt Participated in POC and Goals Yes  -            User Key  (r) = Recorded By, (t) = Taken By, (c) = Cosigned By    Initials Name Provider Type    Sandy Altman, PT Physical Therapist          "       EVALUATION   PT Ortho     Row Name 08/01/22 1400       Subjective Comments    Subjective Comments Daughter reports pt has been wearing compression stockings, but has habit of sleeping in his recliner.  Reports area started as a blister, and has been improving.  Currently using mepitel nonstick dressing, gauze, and ace wraps.  -DIANE       Subjective Pain    Able to rate subjective pain? yes  -DIANE    Pre-Treatment Pain Level 3  -JM    Post-Treatment Pain Level 3  -JM       Transfers    Sit-Stand [place] (Transfers) independent  -JM    Stand-Sit [place] (Transfers) independent  -    Comment, (Transfers) seated for tx  -JM       Gait/Stairs (Locomotion)    [place] Level (Gait) independent  -          User Key  (r) = Recorded By, (t) = Taken By, (c) = Cosigned By    Initials Name Provider Type    Sandy Altman PT Physical Therapist               JANNET Wound     Row Name 08/01/22 1400             [REMOVED] Wound 11/16/20 2125 Right distal leg Blisters    Wound - Properties Group Placement Date: 11/16/20  - Placement Time: 2125 -LG Present on Hospital Admission: Y  -LG Side: Right  -LG Orientation: distal  -LG Location: leg  -LG Primary Wound Type: Blisters  -LG Removal Date: 08/01/22  - Wound Outcome: Unknown  -      Retired Wound - Properties Group Placement Date: 11/16/20  - Placement Time: 2125 -LG Present on Hospital Admission: Y  -LG Side: Right  -LG Orientation: distal  -LG Location: leg  -LG Primary Wound Type: Blisters  -LG Removal Date: 08/01/22  - Wound Outcome: Unknown  -      Retired Wound - Properties Group Date first assessed: 11/16/20  - Time first assessed: 2125 -LG Present on Hospital Admission: Y  -LG Side: Right  -LG Location: leg  -LG Primary Wound Type: Blisters  -LG Resolution Date: 08/01/22  -DIANE Wound Outcome: Unknown  -              Wound 08/01/22 1400 Right distal leg Venous Ulcer    Wound - Properties Group Placement Date: 08/01/22  - Placement  Time: 1400  -JM Side: Right  - Orientation: distal  -JM Location: leg  -JM Primary Wound Type: Venous ulcer  -      Wound Image View All Images View Images  -      Dressing Appearance intact;dried drainage  mepitel, gauze, tape, ace wrap  -      Base closed/resurfaced;dry;epithelialization;scab  -JM      Periwound intact;dry;pink;swelling  -      Periwound Temperature warm  -      Periwound Skin Turgor soft  -      Edges other (see comments)  indistinct  -JM      Wound Length (cm) 3 cm  crusted area medial ankle  -      Wound Width (cm) 2 cm  -      Wound Depth (cm) 0 cm  -      Wound Surface Area (cm^2) 6 cm^2  -JM      Wound Volume (cm^3) 0 cm^3  -JM      Drainage Amount none  -      Retired Wound - Properties Group Placement Date: 08/01/22  - Placement Time: 1400  -JM Side: Right  - Orientation: distal  - Location: leg  - Primary Wound Type: Venous ulcer  -JM      Retired Wound - Properties Group Date first assessed: 08/01/22  - Time first assessed: 1400  - Side: Right  - Location: leg  - Primary Wound Type: Venous ulcer  -            User Key  (r) = Recorded By, (t) = Taken By, (c) = Cosigned By    Initials Name Provider Type    Rhonda Guzman, RN Registered Nurse    Sandy Altman, PT Physical Therapist               Lymphedema     Row Name 08/01/22 1400             Lymphedema Edema Assessment    Pitting Edema Mild;Moderate  -              Skin Changes/Observations    Location/Assessment Lower Extremity  -      Lower Extremity Conditions right:;intact;clean;dry;fragile;scab(s)  -      Lower Extremity Color/Pigment right:;blanchable;hyperpigmented  -              Lymphedema Pulses/Capillary Refill    Lymphedema Pulses/Capillary Refill capillary refill  -      Capillary Refill lower extremity capillary refill  -      Lower Extremity Capillary Refill right:;less than 3 seconds  -              Lymphedema Measurements    Measurement Type(s) Quick  "Girth  -      Quick Girth Areas Lower extremities  -              RLE Quick Girth (cm)    Met-heads 22 cm  -JM      Mid foot 21.5 cm  -JM      Smallest ankle 21.7 cm  -JM      Largest calf 35 cm  -JM      Tib tuberosity 32.7 cm  -JM      RLE Quick Girth Total 132.9  -JM              Compression/Skin Care    Compression/Skin Care skin care;wrapping location;bandaging  -      Skin Care washed/dried;lotion applied  -      Wrapping Location lower extremity  -      Wrapping Location LE right:;foot to knee  -      Wrapping Comments unna boot, 4\" coban, size 6 spandage  -            User Key  (r) = Recorded By, (t) = Taken By, (c) = Cosigned By    Initials Name Provider Type    Sandy Altman, PT Physical Therapist                   Therapy Education     Row Name 08/01/22 1400             Therapy Education    Education Details Keep unna boot dry/intact, may leave in place for up to a week.  If edema improved, may attempt using personal compression stockings.  Issued small optifoam gentle to cover crusted area if using stockings.  Also issued compressogrip (used during previous episodes) and reviewed use of compressogrips as temporary compression until able to resume use of stockings.  Pt to follow up PRN w/n current certification period.  -      Given Bandaging/dressing change;Edema management  -      Program New  -      How Provided Verbal;Demonstration  -      Provided to Patient;Other (comment)  Mary Jo Armas  -DIANE      Level of Understanding Verbalized  -            User Key  (r) = Recorded By, (t) = Taken By, (c) = Cosigned By    Initials Name Provider Type    Sandy Altman, PT Physical Therapist                Recommendation and Plan   PT Assessment/Plan     Row Name 08/01/22 1400          PT Assessment    Functional Limitations Performance in self-care ADL;Other (comment)  edema/wound management limitations  -     Impairments Edema;Integumentary integrity;Pain  -DIANE     " Assessment Comments Pt presents with chronic venous stasis and edema of RLE with resolving ulceration of medial ankle.  Skin of wound area appears mostly closed with fragile crusts present.  PT applied unna boot this tx to improve skin integrity and increase venous return for edema reduction.  Educated pt and family on home compression use and plan to resume compression stocking with optifoam gentle to cover residual crusts after removing unna boot.  Plan to follow up PRN pending response to unna boot.  -DIANE     Rehab Potential Good  -     Patient/caregiver participated in establishment of treatment plan and goals Yes  -     Patient would benefit from skilled therapy intervention Yes  -            PT Plan    PT Frequency Other (comment)  PRN next 2 weeks  -     Predicted Duration of Therapy Intervention (PT) 2-3 visits  -     Planned CPT's? PT EVAL LOW COMPLEXITY: 87456;PT UNNA BOOT: 15822;PT MULTI LAYER COMP SYS LE;PT MODE DEBRIDE OPEN WOUND UP TO 20 CM: 66331;PT NONSELECT DEBRIDE 15 MIN: 15853;PT SELF CARE/MGMT/TRAIN 15 MIN: 43717  -     Physical Therapy Interventions (Optional Details) patient/family education;wound care  -     PT Plan Comments MLW/UB, PRN next 90 days  -           User Key  (r) = Recorded By, (t) = Taken By, (c) = Cosigned By    Initials Name Provider Type    Sandy Altman, PT Physical Therapist                  Goals   PT OP Goals     Row Name 22 1400          Long Term Goals    LTG Date to Achieve 10/30/22  -     LTG 1 Patient/caregiver independent with home compression use.  -DIANE     LTG 1 Progress New  -     LTG 2 Patient without open wounds or skin breakdown to allow resuming of compression stockings for long-term edema management.  -DIANE     LTG 2 Progress New  -DIANE            Time Calculation    PT Goal Re-Cert Due Date 10/30/22  -DIANE           User Key  (r) = Recorded By, (t) = Taken By, (c) = Cosigned By    Initials Name Provider Type    DIANE Stokes  Sandy RODRIGUEZ, PT Physical Therapist                Time Calculation: Start Time: 1400  Untimed Charges  PT Eval/Re-eval Minutes: 30  Wound Care: 16921 Unna boot  [id number]-Mvsi Boot: 15  Total Minutes  Untimed Charges Total Minutes: 45   Total Minutes: 45  Therapy Charges for Today     Code Description Service Date Service Provider Modifiers Qty    75833783269 HC PT STAPPING UNNA BOOT 8/1/2022 Sandy Stokes, PT GP 1    81346413886 HC PT EVAL LOW COMPLEXITY 2 8/1/2022 Sandy Stokes, PT GP 1                Sandy Stokes, PT  8/1/2022

## 2022-09-25 ENCOUNTER — HOSPITAL ENCOUNTER (EMERGENCY)
Facility: HOSPITAL | Age: 87
Discharge: HOME | End: 2022-09-25
Attending: STUDENT IN AN ORGANIZED HEALTH CARE EDUCATION/TRAINING PROGRAM | Admitting: STUDENT IN AN ORGANIZED HEALTH CARE EDUCATION/TRAINING PROGRAM

## 2022-09-25 ENCOUNTER — APPOINTMENT (OUTPATIENT)
Dept: CT IMAGING | Facility: HOSPITAL | Age: 87
End: 2022-09-25

## 2022-09-25 VITALS
TEMPERATURE: 97.5 F | HEIGHT: 67 IN | DIASTOLIC BLOOD PRESSURE: 58 MMHG | HEART RATE: 70 BPM | OXYGEN SATURATION: 94 % | WEIGHT: 135 LBS | SYSTOLIC BLOOD PRESSURE: 110 MMHG | BODY MASS INDEX: 21.19 KG/M2 | RESPIRATION RATE: 18 BRPM

## 2022-09-25 DIAGNOSIS — K64.4 INFLAMED EXTERNAL HEMORRHOID: ICD-10-CM

## 2022-09-25 DIAGNOSIS — K56.41 FECAL IMPACTION: Primary | ICD-10-CM

## 2022-09-25 PROCEDURE — 74176 CT ABD & PELVIS W/O CONTRAST: CPT

## 2022-09-25 PROCEDURE — 99283 EMERGENCY DEPT VISIT LOW MDM: CPT

## 2022-09-25 RX ORDER — MAG HYDROX/ALUMINUM HYD/SIMETH 400-400-40
1 SUSPENSION, ORAL (FINAL DOSE FORM) ORAL AS NEEDED
Qty: 12 SUPPOSITORY | Refills: 2 | Status: SHIPPED | OUTPATIENT
Start: 2022-09-25 | End: 2022-11-02

## 2022-09-25 RX ORDER — BISACODYL 10 MG
10 SUPPOSITORY, RECTAL RECTAL DAILY
Qty: 10 SUPPOSITORY | Refills: 0 | Status: SHIPPED | OUTPATIENT
Start: 2022-09-25 | End: 2022-11-02

## 2022-09-25 RX ORDER — HYDROCORTISONE ACETATE 25 MG/1
25 SUPPOSITORY RECTAL 2 TIMES DAILY
Qty: 12 SUPPOSITORY | Refills: 0 | Status: SHIPPED | OUTPATIENT
Start: 2022-09-25 | End: 2022-10-01

## 2022-09-25 RX ORDER — BISACODYL 10 MG
10 SUPPOSITORY, RECTAL RECTAL DAILY
Status: DISCONTINUED | OUTPATIENT
Start: 2022-09-25 | End: 2022-09-25 | Stop reason: HOSPADM

## 2022-09-25 RX ADMIN — BISACODYL 10 MG: 10 SUPPOSITORY RECTAL at 02:17

## 2022-09-25 RX ADMIN — GLYCERIN 2 SUPPOSITORY: 1 SUPPOSITORY RECTAL at 02:17

## 2022-09-25 NOTE — ED PROVIDER NOTES
EMERGENCY DEPARTMENT ENCOUNTER    Pt Name: Jesus Montiel Jr.  MRN: [id number]  Pt :   1934  Room Number:    Date of encounter:  2022  PCP: Aleida Gutierrez NP  ED Provider: JAIMIE Goodrich    Historian: Patient and daughter      HPI:  Chief Complaint: Rectal pain.         Context: Jesus Montiel Jr. is a 88 y.o. male who presents to the ED c/o rectal pain with inability to tolerate defecation as a result.  He has been having regular bowel movements with his most recent one yesterday.  He has been bearing down excessively without any recognition of blood.  He states it hurts too much to defecate.  He does perceive fullness in his rectum.  He has no history of known hemorrhoids or fecal incontinence or impaction.  He denies any abdominal pain.    Review of systems is negative for fever chills or recent illness.  Negative for chest pain or cough or shortness of breath.  Negative for nausea, vomiting, diarrhea.  No abdominal pain.  Positive for rectal fullness with rectal pain especially reproduced with bearing down.  No history of rectal bleeding.   systems are negative.  No profound weakness, dizziness or syncope.  No neurosensory complaint or focal weakness      PAST MEDICAL HISTORY  Past Medical History:   Diagnosis Date   • Hyperlipidemia    • Hypertension    • Meniere disease    • Myocardial infarction (CMS/HCC)    • Tremor          PAST SURGICAL HISTORY  Past Surgical History:   Procedure Laterality Date   • ABLATION OF DYSRHYTHMIC FOCUS     • CATARACT EXTRACTION, BILATERAL     • CORONARY ANGIOPLASTY WITH STENT PLACEMENT     • CORONARY ARTERY BYPASS BRING BACK FOR EXPLORATION     • HERNIA REPAIR     • INNER EAR SURGERY     • PACEMAKER IMPLANTATION           FAMILY HISTORY  No family history on file.      SOCIAL HISTORY  Social History     Socioeconomic History   • Marital status:    Tobacco Use   • Smoking status: Never Smoker   • Smokeless tobacco: Never Used   Substance and  Sexual Activity   • Alcohol use: Yes     Alcohol/week: 4.0 standard drinks     Types: 4 Glasses of wine per week   • Drug use: No   • Sexual activity: Defer         ALLERGIES  Patient has no known allergies.        REVIEW OF SYSTEMS  Review of Systems   All systems reviewed and negative except for those discussed in HPI.       PHYSICAL EXAM    I have reviewed the triage vital signs and nursing notes.    ED Triage Vitals [09/25/22 0022]   Temp Heart Rate Resp BP SpO2   97.5 °F (36.4 °C) 72 18 143/75 97 %      Temp src Heart Rate Source Patient Position BP Location FiO2 (%)   Oral Monitor Sitting Left arm --       Physical Exam  GENERAL:   Appears in no acute distress.  He has sarcopenia.  His vital signs are normal he is a fair historian.  His daughter is a better historian on his behalf at the bedside  HENT: Nares patent.  EYES: No scleral icterus.  CV: Regular rhythm, regular rate.  No tachycardia.  No peripheral edema  RESPIRATORY: Normal effort.  No audible wheezes, rales or rhonchi.  ABDOMEN: Soft, nontender rectal exam: There is a small pea-sized nonthrombosed hemorrhoid that appears inflamed at 12 o'clock position if he were prone.  There is no fissures.  He does have rectal tenderness with digital exam.  There is a soft volume of stool in the rectal vault that is not unduly large caliber.  There is no gross presence of blood.  MUSCULOSKELETAL: No deformities.   NEURO: Alert, moves all extremities, follows commands.  SKIN: Warm, dry, no rash visualized.        LAB RESULTS  No results found for this or any previous visit (from the past 24 hour(s)).    If labs were ordered, I independently reviewed the results.        RADIOLOGY  CT Abdomen Pelvis Without Contrast    Result Date: 9/25/2022  EXAMINATION:  CT SCAN OF THE ABDOMEN AND PELVIS WITHOUT INTRAVENOUS CONTRAST DATE OF EXAM: 9/25/2022 1:43 AM HISTORY: Rectal pain and constipation  COMPARISON: 2/6/2021 TECHNIQUE: CT examination of the abdomen and pelvis with  sagittal and coronal reformations was performed without intravenous contrast.  CT dose lowering techniques were used, to include: automated exposure control, adjustment for patient size, and/or use  of iterative reconstruction. Note: The exam is limited because some types of pathology may not be adequately demonstrated due to lack of contrast enhancement.    FINDINGS: ABDOMEN/PELVIS: Lower Chest:  Cardiomegaly and median sternotomy. There is a small rounded area of soft tissue adjacent to the right heart border that is more prominent than it was on prior imaging. It is likely just a focus of rounded atelectasis but cannot be definitively characterized on this study. It measures about 1.3 cm across. Liver: Normal. Gallbladder/Biliary: Normal. Pancreas: Normal. Spleen: Normal. Adrenal Glands: Normal. Kidneys: Calcified right renal cyst. GI Tract: There is a bolus of stool in the rectal vault that could be some mild fecal impaction and there is a small amount of perirectal edema but there is no obstruction or other acute abnormality. Mesentery/Peritoneum: Normal. Vasculature: Normal. Lymph Nodes: Normal. Abdominal Wall: Normal. Bladder: Normal. Reproductive: Normal. Musculoskeletal: Normal.     There is a bolus of stool in the rectal vault that could be some fecal impaction and is associated with a small amount of perirectal edema. There is no obstruction or perforation. No other acute abnormalities. Electronically signed by:  Juan Rincon M.D.  9/25/2022 12:55 AM Mountain Time      PROCEDURES    Procedures    No orders to display       MEDICATIONS GIVEN IN ER    Medications   bisacodyl (DULCOLAX) suppository 10 mg (10 mg Rectal Given 9/25/22 0217)   Glycerin (LITZY/PED) suppository 2 suppository (2 suppositories Rectal Given 9/25/22 0217)       ED Course as of 09/25/22 0316   Sun Sep 25, 2022   0314 Patient has had a large bowel movement and is satisfied that he has evacuated his rectal vault.  We discussed  parameters for concern that would warrant return to the emergency department. [MS]      ED Course User Index  [MS] Evita Gonzalez, JAIMIE             AS OF 03:16 EDT VITALS:    BP - 110/58  HR - 70  TEMP - 97.5 °F (36.4 °C) (Oral)  O2 SATS - 94%                  DIAGNOSIS  Final diagnoses:   Fecal impaction (HCC)   Inflamed external hemorrhoid         DISPOSITION    DISCHARGE    Patient discharged in stable condition.    Reviewed implications of results, diagnosis, meds, responsibility to follow up, warning signs and symptoms of possible worsening, potential complications and reasons to return to ER.    Patient/Family voiced understanding of above instructions.    Discussed plan for discharge, as there is no emergent indication for admission.  Pt/family is agreeable and understands need for follow up and possible repeat testing.  Pt/family is aware that discharge does not mean that nothing is wrong but that it indicates no emergency is currently present that requires admission and they must continue care with follow-up as given below or with a physician of their choice.     FOLLOW-UP  Aleida Gutierrez, NP  [address] [id number] [phone]    Schedule an appointment as soon as possible for a visit in 2 days  If symptoms worsen         Medication List      New Prescriptions    bisacodyl 10 MG suppository  Commonly known as: Dulcolax  Insert 1 suppository into the rectum Daily.     glycerin adult 2 g suppository  Insert 1 suppository into the rectum As Needed for Constipation.     hydrocortisone 25 MG suppository  Commonly known as: ANUSOL-HC  Insert 1 suppository into the rectum 2 (Two) Times a Day for 6 days.           Where to Get Your Medications      These medications were sent to [facility]/pharmacy #[id number] - [place], [place] - [address] [facility] - [phone] [facility] [phone]   [address] [facility] [address]    Phone: [phone]   · bisacodyl 10 MG suppository  · glycerin adult  2 g suppository  · hydrocortisone 25 MG suppository                  Evita Gonzalez, APRN  09/25/22 0312

## 2022-09-25 NOTE — DISCHARGE INSTRUCTIONS
Dulcolax and glycerin suppositories have been forwarded to your personal pharmacy for future fecal impaction management.  For the small external hemorrhoid, continue using stool softeners and use the Anusol suppositories for local swelling reduction.  Follow-up with your primary care provider to monitor your recovery.  Thank you

## 2022-10-30 ENCOUNTER — DOCUMENTATION (OUTPATIENT)
Dept: PHYSICAL THERAPY | Facility: HOSPITAL | Age: 87
End: 2022-10-30

## 2022-10-30 NOTE — THERAPY DISCHARGE NOTE
Outpatient Rehabilitation - Wound/Debridement D/C Summary        Patient Name: Jesus Montiel Jr.  : 1934  MRN: [id number]  Today's Date: 10/30/2022                  Admit Date: (Not on file)    Visit Dx:  No diagnosis found.    Patient Active Problem List   Diagnosis   • Cellulitis of right leg   • Failure of outpatient treatment   • Hypertension   • Hyperlipidemia   • A-fib (HCC)   • Thrombocytopenia (HCC)        Past Medical History:   Diagnosis Date   • Hyperlipidemia    • Hypertension    • Meniere disease    • Myocardial infarction (CMS/HCC)    • Tremor         Past Surgical History:   Procedure Laterality Date   • ABLATION OF DYSRHYTHMIC FOCUS     • CATARACT EXTRACTION, BILATERAL     • CORONARY ANGIOPLASTY WITH STENT PLACEMENT     • CORONARY ARTERY BYPASS BRING BACK FOR EXPLORATION     • HERNIA REPAIR     • INNER EAR SURGERY     • PACEMAKER IMPLANTATION           EVALUATION                WOUND DEBRIDEMENT                            Recommendation and Plan      Goals   PT OP Goals     Row Name 10/30/22 1100          PT Short Term Goals    STG 1 Pt will verbalize s/sx of infection.  -     STG 1 Progress Met  -     STG 2 Pt will demonstrate 25% reduction in wound area to indicate healing progress.  -     STG 2 Progress Met  -        Long Term Goals    LTG Date to Achieve 10/30/22  -     LTG 1 Patient/caregiver independent with home compression use.  -     LTG 1 Progress Not Met  -     LTG 2 Patient without open wounds or skin breakdown to allow resuming of compression stockings for long-term edema management.  -     LTG 2 Progress Not Met  -     LTG 2 Progress Comments t  -     LTG 3 Pt will demonstrate only mild/trace BLE edema to indicate appropriate edema management.  -     LTG 3 Progress Met  -           User Key  (r) = Recorded By, (t) = Taken By, (c) = Cosigned By    Initials Name Provider Type    Lizz Byrd, PT Physical Therapist                Time  Calculation:               OP Discharge Summary     Row Name 10/30/22 1200             OP PT Discharge Summary    Date of Discharge 10/30/22  -      Reason for Discharge other (comment)  tentative d/c after evaluation, did not call for follow up  -      Outcomes Achieved Patient able to partially acheive established goals  -      Discharge Destination Unknown  -            User Key  (r) = Recorded By, (t) = Taken By, (c) = Cosigned By    Initials Name Provider Type    Lizz Byrd, PT Physical Therapist                Lizz Menon, JAEL  10/30/2022

## 2023-02-06 ENCOUNTER — APPOINTMENT (OUTPATIENT)
Dept: GENERAL RADIOLOGY | Facility: HOSPITAL | Age: 88
End: 2023-02-06
Payer: MEDICARE

## 2023-02-06 ENCOUNTER — HOSPITAL ENCOUNTER (EMERGENCY)
Facility: HOSPITAL | Age: 88
Discharge: HOME | End: 2023-02-06
Attending: EMERGENCY MEDICINE | Admitting: EMERGENCY MEDICINE
Payer: MEDICARE

## 2023-02-06 ENCOUNTER — APPOINTMENT (OUTPATIENT)
Dept: CT IMAGING | Facility: HOSPITAL | Age: 88
End: 2023-02-06
Payer: MEDICARE

## 2023-02-06 VITALS
BODY MASS INDEX: 20.4 KG/M2 | DIASTOLIC BLOOD PRESSURE: 56 MMHG | WEIGHT: 130 LBS | SYSTOLIC BLOOD PRESSURE: 96 MMHG | HEIGHT: 67 IN | RESPIRATION RATE: 16 BRPM | HEART RATE: 70 BPM | TEMPERATURE: 98 F | OXYGEN SATURATION: 95 %

## 2023-02-06 DIAGNOSIS — K56.41 FECAL IMPACTION: Primary | ICD-10-CM

## 2023-02-06 DIAGNOSIS — R10.31 ACUTE BILATERAL LOWER ABDOMINAL PAIN: ICD-10-CM

## 2023-02-06 DIAGNOSIS — R10.32 ACUTE BILATERAL LOWER ABDOMINAL PAIN: ICD-10-CM

## 2023-02-06 DIAGNOSIS — S09.90XA INJURY OF HEAD, INITIAL ENCOUNTER: ICD-10-CM

## 2023-02-06 DIAGNOSIS — S80.12XA HEMATOMA OF LEFT LOWER EXTREMITY, INITIAL ENCOUNTER: ICD-10-CM

## 2023-02-06 LAB
ALBUMIN SERPL-MCNC: 4.1 G/DL (ref 3.5–5.2)
ALBUMIN/GLOB SERPL: 1.6 G/DL
ALP SERPL-CCNC: 67 U/L (ref 39–117)
ALT SERPL W P-5'-P-CCNC: 26 U/L (ref 1–41)
ANION GAP SERPL CALCULATED.3IONS-SCNC: 9 MMOL/L (ref 5–15)
AST SERPL-CCNC: 36 U/L (ref 1–40)
BACTERIA UR QL AUTO: NORMAL /HPF
BASOPHILS # BLD AUTO: 0.02 10*3/MM3 (ref 0–0.2)
BASOPHILS NFR BLD AUTO: 0.4 % (ref 0–1.5)
BILIRUB SERPL-MCNC: 0.9 MG/DL (ref 0–1.2)
BILIRUB UR QL STRIP: NEGATIVE
BUN SERPL-MCNC: 32 MG/DL (ref 8–23)
BUN/CREAT SERPL: 34.8 (ref 7–25)
CALCIUM SPEC-SCNC: 9.3 MG/DL (ref 8.6–10.5)
CHLORIDE SERPL-SCNC: 101 MMOL/L (ref 98–107)
CLARITY UR: CLEAR
CO2 SERPL-SCNC: 31 MMOL/L (ref 22–29)
COLOR UR: YELLOW
CREAT SERPL-MCNC: 0.92 MG/DL (ref 0.76–1.27)
DEPRECATED RDW RBC AUTO: 49.1 FL (ref 37–54)
EGFRCR SERPLBLD CKD-EPI 2021: 79.5 ML/MIN/1.73
EOSINOPHIL # BLD AUTO: 0.03 10*3/MM3 (ref 0–0.4)
EOSINOPHIL NFR BLD AUTO: 0.5 % (ref 0.3–6.2)
ERYTHROCYTE [DISTWIDTH] IN BLOOD BY AUTOMATED COUNT: 13.9 % (ref 12.3–15.4)
GLOBULIN UR ELPH-MCNC: 2.6 GM/DL
GLUCOSE SERPL-MCNC: 114 MG/DL (ref 65–99)
GLUCOSE UR STRIP-MCNC: NEGATIVE MG/DL
HCT VFR BLD AUTO: 40.4 % (ref 37.5–51)
HGB BLD-MCNC: 13.3 G/DL (ref 13–17.7)
HGB UR QL STRIP.AUTO: NEGATIVE
HYALINE CASTS UR QL AUTO: NORMAL /LPF
IMM GRANULOCYTES # BLD AUTO: 0.02 10*3/MM3 (ref 0–0.05)
IMM GRANULOCYTES NFR BLD AUTO: 0.4 % (ref 0–0.5)
KETONES UR QL STRIP: NEGATIVE
LEUKOCYTE ESTERASE UR QL STRIP.AUTO: NEGATIVE
LIPASE SERPL-CCNC: 36 U/L (ref 13–60)
LYMPHOCYTES # BLD AUTO: 0.96 10*3/MM3 (ref 0.7–3.1)
LYMPHOCYTES NFR BLD AUTO: 17.2 % (ref 19.6–45.3)
MCH RBC QN AUTO: 31.4 PG (ref 26.6–33)
MCHC RBC AUTO-ENTMCNC: 32.9 G/DL (ref 31.5–35.7)
MCV RBC AUTO: 95.5 FL (ref 79–97)
MONOCYTES # BLD AUTO: 0.63 10*3/MM3 (ref 0.1–0.9)
MONOCYTES NFR BLD AUTO: 11.3 % (ref 5–12)
NEUTROPHILS NFR BLD AUTO: 3.92 10*3/MM3 (ref 1.7–7)
NEUTROPHILS NFR BLD AUTO: 70.2 % (ref 42.7–76)
NITRITE UR QL STRIP: NEGATIVE
NRBC BLD AUTO-RTO: 0 /100 WBC (ref 0–0.2)
PH UR STRIP.AUTO: 6.5 [PH] (ref 5–8)
PLATELET # BLD AUTO: 101 10*3/MM3 (ref 140–450)
PMV BLD AUTO: 12.2 FL (ref 6–12)
POTASSIUM SERPL-SCNC: 4.7 MMOL/L (ref 3.5–5.2)
PROT SERPL-MCNC: 6.7 G/DL (ref 6–8.5)
PROT UR QL STRIP: ABNORMAL
RBC # BLD AUTO: 4.23 10*6/MM3 (ref 4.14–5.8)
RBC # UR STRIP: NORMAL /HPF
REF LAB TEST METHOD: NORMAL
SODIUM SERPL-SCNC: 141 MMOL/L (ref 136–145)
SP GR UR STRIP: 1.02 (ref 1–1.03)
SQUAMOUS #/AREA URNS HPF: NORMAL /HPF
UROBILINOGEN UR QL STRIP: ABNORMAL
WBC # UR STRIP: NORMAL /HPF
WBC NRBC COR # BLD: 5.58 10*3/MM3 (ref 3.4–10.8)

## 2023-02-06 PROCEDURE — 81001 URINALYSIS AUTO W/SCOPE: CPT | Performed by: EMERGENCY MEDICINE

## 2023-02-06 PROCEDURE — 99284 EMERGENCY DEPT VISIT MOD MDM: CPT

## 2023-02-06 PROCEDURE — 74176 CT ABD & PELVIS W/O CONTRAST: CPT

## 2023-02-06 PROCEDURE — 36415 COLL VENOUS BLD VENIPUNCTURE: CPT

## 2023-02-06 PROCEDURE — 83690 ASSAY OF LIPASE: CPT | Performed by: EMERGENCY MEDICINE

## 2023-02-06 PROCEDURE — 72125 CT NECK SPINE W/O DYE: CPT

## 2023-02-06 PROCEDURE — 80053 COMPREHEN METABOLIC PANEL: CPT | Performed by: EMERGENCY MEDICINE

## 2023-02-06 PROCEDURE — 85025 COMPLETE CBC W/AUTO DIFF WBC: CPT | Performed by: EMERGENCY MEDICINE

## 2023-02-06 PROCEDURE — 73590 X-RAY EXAM OF LOWER LEG: CPT

## 2023-02-06 PROCEDURE — 70450 CT HEAD/BRAIN W/O DYE: CPT

## 2023-02-06 NOTE — ED PROVIDER NOTES
[place]    EMERGENCY DEPARTMENT ENCOUNTER      Pt Name: Jesus Montiel Jr.  MRN: [id number]  YOB: 1934  Date of evaluation: 2/6/2023  Provider: Jaspreet Sanchez MD    CHIEF COMPLAINT       Chief Complaint   Patient presents with   • Fall   • Constipation         HISTORY OF PRESENT ILLNESS   Jesus Montiel Jr. is a 89 y.o. male who presents to the emergency department with multiple complaints.  He has had constipation has been ongoing for several months and believes he is constipated again.  He states he has not had consistent bowel movement since the fall and has been seen in the emergency department multiple times for this.  He tells me he feels pressure around his rectal area as if he needs to have a bowel movement.  He also had a fall last night.  He states that he got up out of his recliner and tripped and fell backwards and injured his head and left lower leg.  He did not lose consciousness but he is taking Pradaxa.  He noticed some bruising over his left calf this morning but has been able to ambulate on it.  He denies any pain in his neck as well as any peripheral paresthesias, weakness, or numbness.  He has no fever, chills, or other associated symptoms today.      Nursing notes were reviewed.    REVIEW OF SYSTEMS     ROS:  A chief complaint appropriate review of systems was completed and is negative except as noted in the HPI.      PAST MEDICAL HISTORY     Past Medical History:   Diagnosis Date   • Hyperlipidemia    • Hypertension    • Meniere disease    • Myocardial infarction (HCC)    • Tremor          SURGICAL HISTORY       Past Surgical History:   Procedure Laterality Date   • ABLATION OF DYSRHYTHMIC FOCUS     • CATARACT EXTRACTION, BILATERAL     • CORONARY ANGIOPLASTY WITH STENT PLACEMENT     • CORONARY ARTERY BYPASS BRING BACK FOR EXPLORATION     • HERNIA REPAIR     • INNER EAR SURGERY     • PACEMAKER IMPLANTATION           CURRENT MEDICATIONS     No current facility-administered  medications for this encounter.    Current Outpatient Medications:   •  atorvastatin (LIPITOR) 20 MG tablet, Take 20 mg by mouth Daily., Disp: , Rfl:   •  CALCIUM PO, Take  by mouth., Disp: , Rfl:   •  Cholecalciferol (VITAMIN D3) 5000 units capsule capsule, Take 7,000 Units by mouth 1 (One) Time Per Week., Disp: , Rfl:   •  coenzyme Q10 100 MG capsule, Take 100 mg by mouth Daily., Disp: , Rfl:   •  dabigatran etexilate (PRADAXA) 150 MG capsu, Take 150 mg by mouth 2 (Two) Times a Day., Disp: , Rfl:   •  Docusate Calcium (STOOL SOFTENER PO), Take  by mouth Every Other Day., Disp: , Rfl:   •  furosemide (LASIX) 20 MG tablet, Take 20 mg by mouth Daily., Disp: , Rfl:   •  ketoconazole (NIZORAL) 2 % cream, ketoconazole 2 % topical cream  APPLY TO THE AFFECTED AREA(S) BY TOPICAL ROUTE ONCE DAILY, Disp: , Rfl:   •  Multiple Vitamins-Minerals (MULTIVITAMIN ADULTS PO), Take  by mouth., Disp: , Rfl:   •  mupirocin (BACTROBAN) 2 % ointment, mupirocin 2 % topical ointment  APPLY A SMALL AMOUNT TO THE AFFECTED AREA BY TOPICAL ROUTE 2 TIMES PER DAY, Disp: , Rfl:   •  propranolol (INDERAL) 20 MG tablet, Take 20 mg by mouth Daily., Disp: , Rfl:   •  Pyridoxine HCl (VITAMIN B-6 PO), Take 100 mg by mouth., Disp: , Rfl:   •  rivastigmine (EXELON) 9.5 MG/24HR patch, Place 1 patch on the skin as directed by provider Daily., Disp: , Rfl:   •  vitamin B-12 (CYANOCOBALAMIN) 500 MCG tablet, Take  by mouth Daily., Disp: , Rfl:   •  Zinc 25 MG tablet, Take  by mouth., Disp: , Rfl:     ALLERGIES     Patient has no known allergies.    FAMILY HISTORY     No family history on file.       SOCIAL HISTORY       Social History     Socioeconomic History   • Marital status:    Tobacco Use   • Smoking status: Never   • Smokeless tobacco: Never   Substance and Sexual Activity   • Alcohol use: Yes     Alcohol/week: 4.0 standard drinks     Types: 4 Glasses of wine per week   • Drug use: No   • Sexual activity: Defer         PHYSICAL EXAM    (up to 7  for level 4, 8 or more for level 5)     Vitals:    02/06/23 1459 02/06/23 1504 02/06/23 1530 02/06/23 1601   BP: 119/63  104/60 104/61   BP Location:       Patient Position:       Pulse: 70  70 70   Resp:       Temp:       TempSrc:       SpO2:  97% 98% 95%   Weight:       Height:           General: Awake, alert, no acute distress.  HEENT: Pupils are equally round and reactive to light, EOMI, conjunctivae clear, sclerae white, there is no injection no icterus.  Oral mucosa is moist, no exudate. Uvula is midline. No malocclusion or tenderness over the mandible. There is no hemotympanum, pollock sign, or raccoon eyes.  Neck: Neck is supple, full range of motion, trachea midline. No midline tenderness.  Cardiac: Heart regular rate, rhythm, no murmurs, rubs, or gallops. Peripheral pulses are 2+ throughout.  Lungs: Lungs are clear to auscultation, there is no wheezing, rhonchi, or rales. There is no use of accessory muscles.  Chest wall: There is no tenderness to palpation over the chest wall or over ribs. There are no chest wall ecchymoses.  Abdomen: There is mild tenderness over the lower abdomen without any distention, rebound, or guarding.  There is no pulsatile mass.  Musculoskeletal: There is a tender hematoma about 3 cm in diameter over the medial aspect of the left calf.  The compartments are soft and compressible.  Distally, DP and PT pulse 1+ comparable to the opposite side without any motor or sensory dysfunction.  Neuro: Motor intact, sensory intact, level of consciousness is normal.  Dermatology: Skin is warm and dry  Psych: Mentation is grossly normal, cognition is grossly normal. Affect is appropriate.        DIAGNOSTIC RESULTS     EKG: All EKGs are interpreted by the Emergency Department Physician who either signs or Co-signs this chart in the absence of a cardiologist.    No orders to display         RADIOLOGY:   [x] Radiologist's Report Reviewed:  XR Tibia Fibula 2 View Left   Final Result   Impression:    No evidence of acute osseous abnormality.      Electronically Signed: Ethan Li     2/6/2023 2:01 PM EST     Workstation ID: [id number]      CT Head Without Contrast   Final Result   Age-related changes of the brain as above, otherwise without evidence of acute intracranial abnormality.      No evidence of acute fracture or traumatic malalignment of the cervical spine. Multilevel spondylosis changes are noted.          Electronically Signed: Dhaval Pageord     2/6/2023 1:08 PM EST     Workstation ID: [id number]      CT Cervical Spine Without Contrast   Final Result   Age-related changes of the brain as above, otherwise without evidence of acute intracranial abnormality.      No evidence of acute fracture or traumatic malalignment of the cervical spine. Multilevel spondylosis changes are noted.          Electronically Signed: Dhaval Pageord     2/6/2023 1:08 PM EST     Workstation ID: [id number]      CT Abdomen Pelvis Without Contrast   Final Result   Impression:   Large rectal stool which places the patient at risk for developing stercoral colitis.      Bilateral renal cysts including a right renal cyst with coarse calcification consistent with Bosniak 2F. This has been unchanged since 2021. Further follow-up can be considered in one year as clinically warranted.      Electronically Signed: Ethan Li     2/6/2023 1:34 PM EST     Workstation ID: [id number]          I ordered and independently reviewed the above noted radiographic studies.        LABS:    I have reviewed and interpreted all of the currently available lab results from this visit (if applicable):  Results for orders placed or performed during the hospital encounter of 02/06/23   Comprehensive Metabolic Panel    Specimen: Blood   Result Value Ref Range    Glucose 114 (H) 65 - 99 mg/dL    BUN 32 (H) 8 - 23 mg/dL    Creatinine 0.92 0.76 - 1.27 mg/dL    Sodium 141 136 - 145 mmol/L    Potassium 4.7 3.5 - 5.2 mmol/L    Chloride 101 98 - 107 mmol/L     CO2 31.0 (H) 22.0 - 29.0 mmol/L    Calcium 9.3 8.6 - 10.5 mg/dL    Total Protein 6.7 6.0 - 8.5 g/dL    Albumin 4.1 3.5 - 5.2 g/dL    ALT (SGPT) 26 1 - 41 U/L    AST (SGOT) 36 1 - 40 U/L    Alkaline Phosphatase 67 39 - 117 U/L    Total Bilirubin 0.9 0.0 - 1.2 mg/dL    Globulin 2.6 gm/dL    A/G Ratio 1.6 g/dL    BUN/Creatinine Ratio 34.8 (H) 7.0 - 25.0    Anion Gap 9.0 5.0 - 15.0 mmol/L    eGFR 79.5 >60.0 mL/min/1.73   Lipase    Specimen: Blood   Result Value Ref Range    Lipase 36 13 - 60 U/L   CBC Auto Differential    Specimen: Blood   Result Value Ref Range    WBC 5.58 3.40 - 10.80 10*3/mm3    RBC 4.23 4.14 - 5.80 10*6/mm3    Hemoglobin 13.3 13.0 - 17.7 g/dL    Hematocrit 40.4 37.5 - 51.0 %    MCV 95.5 79.0 - 97.0 fL    MCH 31.4 26.6 - 33.0 pg    MCHC 32.9 31.5 - 35.7 g/dL    RDW 13.9 12.3 - 15.4 %    RDW-SD 49.1 37.0 - 54.0 fl    MPV 12.2 (H) 6.0 - 12.0 fL    Platelets 101 (L) 140 - 450 10*3/mm3    Neutrophil % 70.2 42.7 - 76.0 %    Lymphocyte % 17.2 (L) 19.6 - 45.3 %    Monocyte % 11.3 5.0 - 12.0 %    Eosinophil % 0.5 0.3 - 6.2 %    Basophil % 0.4 0.0 - 1.5 %    Immature Grans % 0.4 0.0 - 0.5 %    Neutrophils, Absolute 3.92 1.70 - 7.00 10*3/mm3    Lymphocytes, Absolute 0.96 0.70 - 3.10 10*3/mm3    Monocytes, Absolute 0.63 0.10 - 0.90 10*3/mm3    Eosinophils, Absolute 0.03 0.00 - 0.40 10*3/mm3    Basophils, Absolute 0.02 0.00 - 0.20 10*3/mm3    Immature Grans, Absolute 0.02 0.00 - 0.05 10*3/mm3    nRBC 0.0 0.0 - 0.2 /100 WBC   Urinalysis With Microscopic If Indicated (No Culture) - Urine, Clean Catch    Specimen: Urine, Clean Catch   Result Value Ref Range    Color, UA Yellow Yellow, Straw    Appearance, UA Clear Clear    pH, UA 6.5 5.0 - 8.0    Specific Gravity, UA 1.022 1.001 - 1.030    Glucose, UA Negative Negative    Ketones, UA Negative Negative    Bilirubin, UA Negative Negative    Blood, UA Negative Negative    Protein, UA 30 mg/dL (1+) (A) Negative    Leuk Esterase, UA Negative Negative    Nitrite, UA  Negative Negative    Urobilinogen, UA 0.2 E.U./dL 0.2 - 1.0 E.U./dL   Urinalysis, Microscopic Only - Urine, Clean Catch    Specimen: Urine, Clean Catch   Result Value Ref Range    RBC, UA 0-2 None Seen, 0-2 /HPF    WBC, UA 0-2 None Seen, 0-2 /HPF    Bacteria, UA None Seen None Seen, Trace /HPF    Squamous Epithelial Cells, UA None Seen None Seen, 0-2 /HPF    Hyaline Casts, UA None Seen 0 - 6 /LPF    Methodology Automated Microscopy         If labs were ordered, I independently reviewed the results and considered them in treating the patient.      EMERGENCY DEPARTMENT COURSE and DIFFERENTIAL DIAGNOSIS/MDM:   Vitals:  AS OF 16:41 EST    BP - 104/61  HR - 70  TEMP - 98 °F (36.7 °C) (Oral)  O2 SATS - 95%        Discussion below represents my analysis of pertinent findings related to patient's condition, differential diagnosis, treatment plan and final disposition.      Differential diagnosis:  The differential diagnosis associated with the patient's presentation includes: Concussion, skull fracture, intracranial hemorrhage, C-spine fracture, tibial fracture, fibular fracture, leg hematoma, constipation, intestinal mass, diverticulitis, electrolyte derangement      Independent interpretations (ECG/rhythm strip/X-ray/US/CT scan): Independently reviewed the patient's noncontrast head CT was demonstrates no evidence of intracranial hemorrhage.  I dependently reviewed the patient's left lower extremity x-ray which demonstrates no evidence of fracture.  Independently interpreted the patient's cardiac monitoring which demonstrates sinus rhythm with no dysrhythmia.      Additional sources:  Discussed/obtained information from independent historians:   [] Spouse:   [] Parent:   [] Friend:   [] EMS:   [x] Other: I spoke with patient's daughter at bedside.  She reaffirms that patient simply tripped and fell but did not pass out.  She also states that this is typical for his usual constipation presentation for which she has been  seen multiple times in the past.    External (non-ED) record review:   [] Inpatient record:   [] Office record:   [] Outpatient record:   [] Prior Outpatient labs:   [x] Prior Outpatient radiology: I reviewed abdominal CT performed on September 25, 2022 that demonstrated some fecal impaction.   [] Primary Care record:   [x] Outside ED record: I reviewed emergency department record from September 25, 2022 in which patient was seen for constipation.   [] Other:       Patient's care impacted by:   [] Diabetes   [x] Hypertension   [x] Coronary Artery Disease   [] Cancer   [] Other:     Care significantly affected by Social Determinants of Health (housing and economic circumstances, unemployment)    [] Yes     [x] No   If yes, Patient's care significantly limited by  Social Determinants of Health including:    [] Inadequate housing    [] Low income    [] Alcoholism and drug addiction in family    [] Problems related to primary support group    [] Unemployment    [] Problems related to employment    [] Other Social Determinants of Health:       Consideration of admission/observation vs discharge: Patient has no evidence of significant traumatic injury and has fecal impaction that resolved with use of enema.  He is appropriate for discharge home with outpatient follow-up and does not require admission at this point.      I considered prescription management with:    [x] Pain medication: I did consider narcotic pain medication with the patient lower abdominal discomfort, however felt that relief of the impaction would be most appropriate rather than masking symptoms with medication   [] Antiviral:   [] Antibiotic:   [x] Other: Patient provided with soapsuds enema during the course of his stay in the ED    Additional orders considered but not ordered:  The following testing was considered but ultimately not selected after discussion with patient/family: I did consider vascular imaging of the left lower extremity with CTA,  however hematoma appears stable, compartments are soft and compressible, and there appears to be no evidence of ongoing bleeding and so this was deferred.    ED Course:    ED Course as of 02/06/23 1641   Mon Feb 06, 2023   1638 On examination, patient is asleep.  He remains very well-appearing and nontoxic.  Vital signs remained within normal limits.  I have reviewed all of his data and the only abnormality I find is fecal impaction.  There is no evidence of significant traumatic injury.  I have placed an order for an enema which will be performed to clear the obstruction prior to discharge. [NS]      ED Course User Index  [NS] Jaspreet Sanchez MD             I had a discussion with the patient/family regarding diagnosis, diagnostic results, treatment plan, and medications.  The patient/family indicated understanding of these instructions.  I spent adequate time at the bedside preceding discharge necessary to personally discuss the aftercare instructions, giving patient education, providing explanations of the results of our evaluations/findings, and my decision making to assure that the patient/family understand the plan of care.  Time was allotted to answer questions at that time and throughout the ED course.  Emphasis was placed on timely follow-up after discharge.  I also discussed the potential for the development of an acute emergent condition requiring further evaluation, admission, or even surgical intervention. I discussed that we found nothing during the visit today indicating the need for further workup, admission, or the presence of an unstable medical condition.  I encouraged the patient to return to the emergency department immediately for ANY concerns, worsening, new complaints, or if symptoms persist and unable to seek follow-up in a timely fashion.  The patient/family expressed understanding and agreement with this plan.  The patient will follow-up with their PCP in 1-2 days for reevaluation.            PROCEDURES:  Procedures    CRITICAL CARE TIME        FINAL IMPRESSION      1. Fecal impaction (HCC)    2. Injury of head, initial encounter    3. Hematoma of left lower extremity, initial encounter    4. Acute bilateral lower abdominal pain          DISPOSITION/PLAN     ED Disposition     ED Disposition   Discharge    Condition   Stable    Comment   --               Comment: Please note this report has been produced using speech recognition software.      Jaspreet Sanchez MD  Attending Emergency Physician           Jaspreet Sanchez MD  02/06/23 [id number]

## 2023-02-13 ENCOUNTER — HOSPITAL ENCOUNTER (EMERGENCY)
Facility: HOSPITAL | Age: 88
Discharge: HOME | End: 2023-02-13
Attending: EMERGENCY MEDICINE | Admitting: EMERGENCY MEDICINE
Payer: MEDICARE

## 2023-02-13 VITALS
DIASTOLIC BLOOD PRESSURE: 76 MMHG | OXYGEN SATURATION: 97 % | HEART RATE: 68 BPM | TEMPERATURE: 98.8 F | SYSTOLIC BLOOD PRESSURE: 122 MMHG | BODY MASS INDEX: 23.05 KG/M2 | HEIGHT: 64 IN | WEIGHT: 135 LBS | RESPIRATION RATE: 18 BRPM

## 2023-02-13 DIAGNOSIS — L03.116 CELLULITIS OF LEFT LOWER EXTREMITY: Primary | ICD-10-CM

## 2023-02-13 DIAGNOSIS — S80.812A ABRASION OF LEFT LOWER EXTREMITY, INITIAL ENCOUNTER: ICD-10-CM

## 2023-02-13 DIAGNOSIS — I87.2 CHRONIC VENOUS STASIS DERMATITIS: ICD-10-CM

## 2023-02-13 PROCEDURE — 99282 EMERGENCY DEPT VISIT SF MDM: CPT

## 2023-02-13 RX ORDER — DOXYCYCLINE 100 MG/1
100 CAPSULE ORAL 2 TIMES DAILY
Qty: 20 CAPSULE | Refills: 0 | Status: SHIPPED | OUTPATIENT
Start: 2023-02-13

## 2023-02-13 NOTE — PROGRESS NOTES
"Pharmacy Consult-Vancomycin Dosing  Jesus Montiel Jr. is a  86 y.o. male receiving vancomycin therapy.     Indication: SSTI  Consulting Provider: hospitalist  ID Consult:     Goal AUC: 400 - 600 mg/L*hr    Current Antimicrobial Therapy  Anti-Infectives (From admission, onward)      Ordered     Dose/Rate Route Frequency Start Stop    11/16/20 2150  vancomycin in dextrose 5% 150 mL (VANCOCIN) IVPB 750 mg     Ordering Provider: Jesus Escobar, PharmD    750 mg  over 60 Minutes Intravenous Every 24 Hours 11/17/20 2100 11/24/20 2059 11/16/20 2141  Pharmacy to dose vancomycin     Ordering Provider: Kaya Means PA-C     Does not apply Continuous PRN 11/16/20 2141 11/23/20 2140 11/16/20 1742  vancomycin 1250 mg/250 mL 0.9% NS IVPB ([facility])     Ordering Provider: Randi Arana MD    20 mg/kg × 63.5 kg  166.7 mL/hr over 90 Minutes Intravenous Once 11/16/20 1744              Allergies  Allergies as of 11/16/2020    (No Known Allergies)       Labs    Results from last 7 days   Lab Units 11/16/20  1644   BUN mg/dL 30*   CREATININE mg/dL 0.97       Results from last 7 days   Lab Units 11/16/20  1644   WBC 10*3/mm3 6.90       Evaluation of Dosing     Last Dose Received in the ED/Outside Facility: 1250mg in ED  Is Patient on Dialysis or Renal Replacement: no    Ht - 175.3 cm (69\")  Wt - 63.5 kg (140 lb)    Estimated Creatinine Clearance: 49.1 mL/min (by C-G formula based on SCr of 0.97 mg/dL).    Intake & Output (last 3 days)         11/14 0701 - 11/15 0700 11/15 0701 - 11/16 0700 11/16 0701 - 11/17 0700    Urine (mL/kg/hr)   275    Total Output   275    Net   -275                   Microbiology and Radiology  Microbiology Results (last 10 days)       ** No results found for the last 240 hours. **            Reported Vancomycin Levels                         [employer] AUC Calculation:    Current AUC:   0 mg/L*hr    Predicted Steady State AUC :   453 mg/L*hr    Assessment/Plan: The patient was started on a bolus of " 20mg/kg for a dose of 1250mg . Will initiate maintenance dose at 1000 mg IV every 24 hours.  Plan for trough as patient approaches steady state, prior to the 3rd dose.  Due to infection severity, will target an AUC of 400-600.      Pharmacy will continue to follow the patient’s culture results and clinical progress daily.    Jesus Escobar, PharmD     Vacuum Extraction was not used

## 2023-02-13 NOTE — ED PROVIDER NOTES
Subjective   History of Present Illness  89-year-old male presents emergency department today with some swelling and redness to the left lower extremity.  Apparently he was over at neurology to get an EMG study done and they were concerned that maybe he had a cellulitis of his lower extremity due to the redness.  He has chronic venous insufficiency and venous stasis changes.  His daughter states that there has been some new redness.  They did not want to do the EMG study because they are afraid there could be going through infected skin.  He had no fevers no chills.  The redness is not severe and slightly increased the posterior aspect he has a very small wound that was open but is now scabbed over.  He has chronic decree sensation in the lower extremities and burning sensation and that is the reason he was you have the EMG studies done.  Those symptoms been going over for a year.  This new redness on groin for a day or 2.  He is not immunocompromised.  He had no nausea or vomiting.    History provided by:  Patient and relative   used: No    Leg Pain  Location:  Leg  Time since incident:  2 days  Injury: yes    Mechanism of injury comment:  Very small scabbed area where he scratched this walking into an object.  Pain details:     Quality:  Burning    Radiates to:  Does not radiate    Severity:  Mild    Onset quality:  Gradual    Timing:  Constant    Progression:  Unchanged  Chronicity:  Chronic (Only new symptoms with a mild amount of redness was just occurred)  Tetanus status:  Up to date  Prior injury to area:  Yes  Relieved by:  Elevation  Worsened by:  Nothing  Ineffective treatments:  None tried  Associated symptoms: no back pain, no fever, no itching, no muscle weakness, no neck pain, no numbness, no stiffness and no tingling    Risk factors: no concern for non-accidental trauma, no frequent fractures, no obesity and no recent illness        Review of Systems   Constitutional: Negative for  chills and fever.   Respiratory: Negative for chest tightness, shortness of breath and wheezing.    Cardiovascular: Negative for chest pain and palpitations.   Gastrointestinal: Negative for abdominal pain, diarrhea, nausea and vomiting.   Musculoskeletal: Negative for back pain, neck pain and stiffness.   Skin: Negative for itching, pallor and rash.   Psychiatric/Behavioral: Negative.    All other systems reviewed and are negative.      Past Medical History:   Diagnosis Date   • Hyperlipidemia    • Hypertension    • Meniere disease    • Myocardial infarction (HCC)    • Tremor        No Known Allergies    Past Surgical History:   Procedure Laterality Date   • ABLATION OF DYSRHYTHMIC FOCUS     • CATARACT EXTRACTION, BILATERAL     • CORONARY ANGIOPLASTY WITH STENT PLACEMENT     • CORONARY ARTERY BYPASS BRING BACK FOR EXPLORATION     • HERNIA REPAIR     • INNER EAR SURGERY     • PACEMAKER IMPLANTATION         History reviewed. No pertinent family history.    Social History     Socioeconomic History   • Marital status:    Tobacco Use   • Smoking status: Never   • Smokeless tobacco: Never   Substance and Sexual Activity   • Alcohol use: Yes     Alcohol/week: 4.0 standard drinks     Types: 4 Glasses of wine per week   • Drug use: No   • Sexual activity: Defer           Objective   Physical Exam  Vitals and nursing note reviewed.   Constitutional:       Appearance: He is well-developed.   HENT:      Head: Normocephalic and atraumatic.      Right Ear: External ear normal.      Left Ear: External ear normal.      Nose: Nose normal.   Eyes:      General: No scleral icterus.     Conjunctiva/sclera: Conjunctivae normal.      Pupils: Pupils are equal, round, and reactive to light.   Neck:      Thyroid: No thyromegaly.   Pulmonary:      Effort: Pulmonary effort is normal. No respiratory distress.      Breath sounds: No rales.   Musculoskeletal:         General: Normal range of motion.   Skin:     General: Skin is warm and  "dry.      Comments: Bilateral venous stasis changes noted.  There is some mild redness to the left lower extremity mild increased warmth to touch is a very small scabbed over the medial aspect.  No red streaks no regional lymphadenitis lymphadenopathy.  He is afebrile   Neurological:      Mental Status: He is alert and oriented to person, place, and time.      Cranial Nerves: No cranial nerve deficit.      Coordination: Coordination normal.      Deep Tendon Reflexes: Reflexes are normal and symmetric. Reflexes normal.   Psychiatric:         Behavior: Behavior normal.         Thought Content: Thought content normal.         Judgment: Judgment normal.         Procedures           ED Course                No results found for this or any previous visit (from the past 24 hour(s)).  Note: In addition to lab results from this visit, the labs listed above may include labs taken at another facility or during a different encounter within the last 24 hours. Please correlate lab times with ED admission and discharge times for further clarification of the services performed during this visit.    No orders to display     Vitals:    02/13/23 1654 02/13/23 1851   BP: 113/62 122/76   BP Location:  Right arm   Pulse: 72 68   Resp: 18    Temp: 98.8 °F (37.1 °C)    TempSrc: Oral    SpO2: 97% 97%   Weight: 61.2 kg (135 lb)    Height: 162.6 cm (64\")      Medications - No data to display  ECG/EMG Results (last 24 hours)     ** No results found for the last 24 hours. **        No orders to display                                    Medical Decision Making  Sent over here from the neurologist for possible cellulitis.  He is asymptomatic other than the redness and increased warmth to touch.  He has chronic pain in his left lower extremity he has had no prior history of MRSA.  We will start him on doxycycline.  His daughter is taken a picture of the leg so we can track this he has home health will come to [facility] to see him if " needed.    Abrasion of left lower extremity, initial encounter: acute illness or injury  Cellulitis of left lower extremity: acute illness or injury  Chronic venous stasis dermatitis: acute illness or injury  Risk  Prescription drug management.          Final diagnoses:   Cellulitis of left lower extremity   Chronic venous stasis dermatitis   Abrasion of left lower extremity, initial encounter       ED Disposition  ED Disposition     ED Disposition   Discharge    Condition   Stable    Comment   --             Jaci Nielsen, APRN  [address] [facility] [address]  [place] [address]  [phone]          [facility]  [address] [facility] [address] [phone]    If symptoms worsen         Medication List      New Prescriptions    doxycycline 100 MG capsule  Commonly known as: MONODOX  Take 1 capsule by mouth 2 (Two) Times a Day.           Where to Get Your Medications      These medications were sent to [facility]/pharmacy #[id number] - [place], [place] - [address] [facility] - [phone]  - [phone]   [address] [facility] [address]    Phone: [phone]   · doxycycline 100 MG capsule          Quintin Marcos PA  02/14/23 2396

## 2023-05-27 ENCOUNTER — HOSPITAL ENCOUNTER (EMERGENCY)
Facility: HOSPITAL | Age: 88
Discharge: HOME | End: 2023-05-27
Attending: EMERGENCY MEDICINE
Payer: MEDICARE

## 2023-05-27 VITALS
TEMPERATURE: 97.8 F | SYSTOLIC BLOOD PRESSURE: 122 MMHG | RESPIRATION RATE: 18 BRPM | HEIGHT: 64 IN | HEART RATE: 70 BPM | OXYGEN SATURATION: 98 % | BODY MASS INDEX: 23.05 KG/M2 | WEIGHT: 135 LBS | DIASTOLIC BLOOD PRESSURE: 81 MMHG

## 2023-05-27 DIAGNOSIS — K62.5 RECTAL BLEEDING: ICD-10-CM

## 2023-05-27 DIAGNOSIS — K59.00 CONSTIPATION, UNSPECIFIED CONSTIPATION TYPE: ICD-10-CM

## 2023-05-27 DIAGNOSIS — D69.6 THROMBOCYTOPENIA: ICD-10-CM

## 2023-05-27 DIAGNOSIS — K56.41 FECAL IMPACTION IN RECTUM: Primary | ICD-10-CM

## 2023-05-27 LAB
ALBUMIN SERPL-MCNC: 3.7 G/DL (ref 3.5–5.2)
ALBUMIN/GLOB SERPL: 1.5 G/DL
ALP SERPL-CCNC: 58 U/L (ref 39–117)
ALT SERPL W P-5'-P-CCNC: 17 U/L (ref 1–41)
ANION GAP SERPL CALCULATED.3IONS-SCNC: 11 MMOL/L (ref 5–15)
AST SERPL-CCNC: 29 U/L (ref 1–40)
BASOPHILS # BLD AUTO: 0.03 10*3/MM3 (ref 0–0.2)
BASOPHILS NFR BLD AUTO: 0.8 % (ref 0–1.5)
BILIRUB SERPL-MCNC: 0.8 MG/DL (ref 0–1.2)
BUN SERPL-MCNC: 26 MG/DL (ref 8–23)
BUN/CREAT SERPL: 26.3 (ref 7–25)
CALCIUM SPEC-SCNC: 9 MG/DL (ref 8.6–10.5)
CHLORIDE SERPL-SCNC: 101 MMOL/L (ref 98–107)
CO2 SERPL-SCNC: 29 MMOL/L (ref 22–29)
CREAT SERPL-MCNC: 0.99 MG/DL (ref 0.76–1.27)
DEPRECATED RDW RBC AUTO: 49.8 FL (ref 37–54)
EGFRCR SERPLBLD CKD-EPI 2021: 72.8 ML/MIN/1.73
EOSINOPHIL # BLD AUTO: 0.05 10*3/MM3 (ref 0–0.4)
EOSINOPHIL NFR BLD AUTO: 1.4 % (ref 0.3–6.2)
ERYTHROCYTE [DISTWIDTH] IN BLOOD BY AUTOMATED COUNT: 14.2 % (ref 12.3–15.4)
GLOBULIN UR ELPH-MCNC: 2.5 GM/DL
GLUCOSE SERPL-MCNC: 99 MG/DL (ref 65–99)
HCT VFR BLD AUTO: 40.6 % (ref 37.5–51)
HGB BLD-MCNC: 13 G/DL (ref 13–17.7)
IMM GRANULOCYTES # BLD AUTO: 0 10*3/MM3 (ref 0–0.05)
IMM GRANULOCYTES NFR BLD AUTO: 0 % (ref 0–0.5)
LYMPHOCYTES # BLD AUTO: 0.91 10*3/MM3 (ref 0.7–3.1)
LYMPHOCYTES NFR BLD AUTO: 24.8 % (ref 19.6–45.3)
MCH RBC QN AUTO: 30.6 PG (ref 26.6–33)
MCHC RBC AUTO-ENTMCNC: 32 G/DL (ref 31.5–35.7)
MCV RBC AUTO: 95.5 FL (ref 79–97)
MONOCYTES # BLD AUTO: 0.42 10*3/MM3 (ref 0.1–0.9)
MONOCYTES NFR BLD AUTO: 11.4 % (ref 5–12)
NEUTROPHILS NFR BLD AUTO: 2.26 10*3/MM3 (ref 1.7–7)
NEUTROPHILS NFR BLD AUTO: 61.6 % (ref 42.7–76)
NRBC BLD AUTO-RTO: 0 /100 WBC (ref 0–0.2)
PLATELET # BLD AUTO: 84 10*3/MM3 (ref 140–450)
PMV BLD AUTO: 12 FL (ref 6–12)
POTASSIUM SERPL-SCNC: 3.5 MMOL/L (ref 3.5–5.2)
PROT SERPL-MCNC: 6.2 G/DL (ref 6–8.5)
RBC # BLD AUTO: 4.25 10*6/MM3 (ref 4.14–5.8)
SODIUM SERPL-SCNC: 141 MMOL/L (ref 136–145)
WBC NRBC COR # BLD: 3.67 10*3/MM3 (ref 3.4–10.8)

## 2023-05-27 PROCEDURE — 36415 COLL VENOUS BLD VENIPUNCTURE: CPT

## 2023-05-27 PROCEDURE — 99284 EMERGENCY DEPT VISIT MOD MDM: CPT

## 2023-05-27 PROCEDURE — 85025 COMPLETE CBC W/AUTO DIFF WBC: CPT | Performed by: EMERGENCY MEDICINE

## 2023-05-27 PROCEDURE — 80053 COMPREHEN METABOLIC PANEL: CPT | Performed by: EMERGENCY MEDICINE

## 2023-05-27 PROCEDURE — 99283 EMERGENCY DEPT VISIT LOW MDM: CPT

## 2023-05-27 RX ORDER — LIDOCAINE HYDROCHLORIDE 20 MG/ML
JELLY TOPICAL AS NEEDED
Status: DISCONTINUED | OUTPATIENT
Start: 2023-05-27 | End: 2023-05-27 | Stop reason: HOSPADM

## 2023-05-27 RX ADMIN — LIDOCAINE HYDROCHLORIDE: 20 JELLY TOPICAL at 09:54

## 2023-05-27 NOTE — ED PROVIDER NOTES
Subjective   History of Present Illness  This is a pleasant 89-year-old male followed by a nurse practitioner who visits him and his place at [facility].  He is seen by Dr. Nagel at  for essential tremor.  He gets around by using walking sticks and is company by his daughter today.    He has been plagued with constipation in the past and has it again today.  He is on the stool softener once or twice a day.  He had had an enema and disimpaction had a CT scan and a full work-up in February of this year I saw that.  Is been a few days since his last bowel movement he has had some rectal pain.  He has been passing gas but no stool.  No fevers or chills or abdominal pain his p.o. intake has been okay.  He is not been on MiraLAX that he can think of in the past.  He is generally pretty miserable.        Review of Systems   All other systems reviewed and are negative.      Past Medical History:   Diagnosis Date   • Hyperlipidemia    • Hypertension    • Meniere disease    • Myocardial infarction    • Tremor        No Known Allergies    Past Surgical History:   Procedure Laterality Date   • ABLATION OF DYSRHYTHMIC FOCUS     • CATARACT EXTRACTION, BILATERAL     • CORONARY ANGIOPLASTY WITH STENT PLACEMENT     • CORONARY ARTERY BYPASS BRING BACK FOR EXPLORATION     • HERNIA REPAIR     • INNER EAR SURGERY     • PACEMAKER IMPLANTATION         No family history on file.    Social History     Socioeconomic History   • Marital status:    Tobacco Use   • Smoking status: Never   • Smokeless tobacco: Never   Substance and Sexual Activity   • Alcohol use: Yes     Alcohol/week: 4.0 standard drinks     Types: 4 Glasses of wine per week   • Drug use: No   • Sexual activity: Defer           Objective   Physical Exam  Vitals and nursing note reviewed.   Constitutional:       Appearance: He is normal weight.      Comments: Pleasant fairly lean 89-year-old daughter said he likes to get out and walk quite a bit.  He is in  no distress.  He is a pretty good history.   HENT:      Head: Normocephalic and atraumatic.      Right Ear: External ear normal.      Left Ear: External ear normal.      Nose: Nose normal.      Mouth/Throat:      Mouth: Mucous membranes are moist.      Pharynx: Oropharynx is clear.   Eyes:      Extraocular Movements: Extraocular movements intact.      Conjunctiva/sclera: Conjunctivae normal.      Pupils: Pupils are equal, round, and reactive to light.   Cardiovascular:      Pulses: Normal pulses.   Pulmonary:      Effort: Pulmonary effort is normal.   Abdominal:      Comments: Positive bowel sounds soft and nontender without organomegaly masses or guarding.   Genitourinary:     Comments: Digital rectal exam shows fecal impaction brown stool with no obvious blood.  Externally he has a nonthrombosed small hemorrhoid 6 o'clock position.  Musculoskeletal:         General: No swelling or tenderness. Normal range of motion.   Skin:     General: Skin is warm and dry.      Capillary Refill: Capillary refill takes less than 2 seconds.   Neurological:      Mental Status: He is alert.      Comments: Face symmetric, voice strong, tongue midline.  Vision, hearing, and speech preserved.  Will bit stiff but no focal weakness.         Procedures     Procedure is fecal disimpaction and enema.  Indication is fecal impaction.  Failed soapsuds enema by the nurses.    Patient with fecal impaction had small results with the initial soapsuds enema.  Talked the patient about this and as well as his daughter and agreed to proceed.  Permission was oral.  Patient placed in left lateral decubitus position.  About 5 mL of 2% lidocaine placed in anus.  Using soapsuds enema and standard disimpaction techniques I was able to room move a large amount of stool from the patient's rectum.  Additional 5 mL of 2% lidocaine were placed post procedure the patient's rectum for discomfort.  He did have initial bleeding probably from a small rectal ulcer due  to the fecal impaction but this cleared and he had no subsequent bleeding.  Tolerated the procedure relatively well with large amounts of stool and on recheck his abdomen was benign and he felt much improved.      ED Course            Recent Results (from the past 24 hour(s))   Comprehensive Metabolic Panel    Collection Time: 05/27/23  9:53 AM    Specimen: Blood   Result Value Ref Range    Glucose 99 65 - 99 mg/dL    BUN 26 (H) 8 - 23 mg/dL    Creatinine 0.99 0.76 - 1.27 mg/dL    Sodium 141 136 - 145 mmol/L    Potassium 3.5 3.5 - 5.2 mmol/L    Chloride 101 98 - 107 mmol/L    CO2 29.0 22.0 - 29.0 mmol/L    Calcium 9.0 8.6 - 10.5 mg/dL    Total Protein 6.2 6.0 - 8.5 g/dL    Albumin 3.7 3.5 - 5.2 g/dL    ALT (SGPT) 17 1 - 41 U/L    AST (SGOT) 29 1 - 40 U/L    Alkaline Phosphatase 58 39 - 117 U/L    Total Bilirubin 0.8 0.0 - 1.2 mg/dL    Globulin 2.5 gm/dL    A/G Ratio 1.5 g/dL    BUN/Creatinine Ratio 26.3 (H) 7.0 - 25.0    Anion Gap 11.0 5.0 - 15.0 mmol/L    eGFR 72.8 >60.0 mL/min/1.73   CBC Auto Differential    Collection Time: 05/27/23  9:53 AM    Specimen: Blood   Result Value Ref Range    WBC 3.67 3.40 - 10.80 10*3/mm3    RBC 4.25 4.14 - 5.80 10*6/mm3    Hemoglobin 13.0 13.0 - 17.7 g/dL    Hematocrit 40.6 37.5 - 51.0 %    MCV 95.5 79.0 - 97.0 fL    MCH 30.6 26.6 - 33.0 pg    MCHC 32.0 31.5 - 35.7 g/dL    RDW 14.2 12.3 - 15.4 %    RDW-SD 49.8 37.0 - 54.0 fl    MPV 12.0 6.0 - 12.0 fL    Platelets 84 (L) 140 - 450 10*3/mm3    Neutrophil % 61.6 42.7 - 76.0 %    Lymphocyte % 24.8 19.6 - 45.3 %    Monocyte % 11.4 5.0 - 12.0 %    Eosinophil % 1.4 0.3 - 6.2 %    Basophil % 0.8 0.0 - 1.5 %    Immature Grans % 0.0 0.0 - 0.5 %    Neutrophils, Absolute 2.26 1.70 - 7.00 10*3/mm3    Lymphocytes, Absolute 0.91 0.70 - 3.10 10*3/mm3    Monocytes, Absolute 0.42 0.10 - 0.90 10*3/mm3    Eosinophils, Absolute 0.05 0.00 - 0.40 10*3/mm3    Basophils, Absolute 0.03 0.00 - 0.20 10*3/mm3    Immature Grans, Absolute 0.00 0.00 - 0.05  "10*3/mm3    nRBC 0.0 0.0 - 0.2 /100 WBC     Note: In addition to lab results from this visit, the labs listed above may include labs taken at another facility or during a different encounter within the last 24 hours. Please correlate lab times with ED admission and discharge times for further clarification of the services performed during this visit.    No orders to display     Vitals:    05/27/23 0141 05/27/23 0144   BP: 122/81    BP Location: Right arm    Patient Position: Sitting    Pulse: 70    Resp: 18    Temp: 97.8 °F (36.6 °C)    TempSrc: Oral    SpO2: 98%    Weight:  61.2 kg (135 lb)   Height:  162.6 cm (64\")     Medications   Lidocaine HCl gel (XYLOCAINE) urethral/mucosal syringe ( Topical Given by Other 5/27/23 0954)     ECG/EMG Results (last 24 hours)     ** No results found for the last 24 hours. **        No orders to display                                       Medical Decision Making        I reviewed all available studies at the bedside with the patient and his daughter.  He has mild thrombocytopenia that appears chronic on his labs that he had not known about in the past.  His chemistries were bland.    He has had fecal impaction in the past and constipation I had a long talk with the patient and his daughter about treatment of this with MiraLAX at home so he does not get in the shape again.  I have asked him to follow-up with his PCP.  I think at this point even though he had a little blood pursuing aggressive course such as colonoscopy is probably not warranted.    He was up and ambulatory and taking p.o. without difficulty.    Long ED course is he was here for several hours before being seen in had multiple enemas.  He is appreciative of care.    He will return to the ED if worse in any way.    All are agreeable with the plan    Constipation, unspecified constipation type: chronic illness or injury with exacerbation, progression, or side effects of treatment that poses a threat to life or bodily " functions  Fecal impaction in rectum: complicated acute illness or injury with systemic symptoms that poses a threat to life or bodily functions  Rectal bleeding: complicated acute illness or injury that poses a threat to life or bodily functions  Thrombocytopenia: undiagnosed new problem with uncertain prognosis  Amount and/or Complexity of Data Reviewed  Independent Historian: guardian  External Data Reviewed: labs.  Labs: ordered.      Risk  Prescription drug management.          Final diagnoses:   Fecal impaction in rectum   Constipation, unspecified constipation type   Rectal bleeding   Thrombocytopenia       ED Disposition  ED Disposition     ED Disposition   Discharge    Condition   Stable    Comment   --             Jaci Nielsen, APRN  [address] [place] [address]  [phone]    Schedule an appointment as soon as possible for a visit            Medication List      No changes were made to your prescriptions during this visit.          Frank Parr MD  05/27/23 0402

## 2023-05-27 NOTE — DISCHARGE INSTRUCTIONS
You need to use MiraLAX every day up to 6 doses a day you can mix it with food or water but your goal should be having a bowel movement every day the consistency of soft serve ice cream.  If you are passing pellets and logs or not on enough and if you are having diarrhea you are on too much and you have to just get the right dose for you.

## 2023-12-29 ENCOUNTER — HOSPITAL ENCOUNTER (EMERGENCY)
Facility: HOSPITAL | Age: 88
Discharge: HOME | End: 2023-12-30
Attending: EMERGENCY MEDICINE
Payer: MEDICARE

## 2023-12-29 ENCOUNTER — APPOINTMENT (OUTPATIENT)
Dept: CT IMAGING | Facility: HOSPITAL | Age: 88
End: 2023-12-29
Payer: MEDICARE

## 2023-12-29 DIAGNOSIS — K59.00 CONSTIPATION, UNSPECIFIED CONSTIPATION TYPE: ICD-10-CM

## 2023-12-29 DIAGNOSIS — K56.41 FECAL IMPACTION: Primary | ICD-10-CM

## 2023-12-29 PROCEDURE — 99284 EMERGENCY DEPT VISIT MOD MDM: CPT

## 2023-12-29 PROCEDURE — 74176 CT ABD & PELVIS W/O CONTRAST: CPT

## 2023-12-29 RX ORDER — MINERAL OIL 100 G/100G
1 OIL RECTAL ONCE
Status: DISCONTINUED | OUTPATIENT
Start: 2023-12-29 | End: 2023-12-30 | Stop reason: HOSPADM

## 2023-12-30 VITALS
HEART RATE: 71 BPM | OXYGEN SATURATION: 99 % | RESPIRATION RATE: 18 BRPM | TEMPERATURE: 98 F | SYSTOLIC BLOOD PRESSURE: 109 MMHG | HEIGHT: 64 IN | DIASTOLIC BLOOD PRESSURE: 55 MMHG | WEIGHT: 135 LBS | BODY MASS INDEX: 23.05 KG/M2

## 2023-12-30 RX ORDER — LACTULOSE 10 G/15ML
20 SOLUTION ORAL 2 TIMES DAILY
Qty: 120 ML | Refills: 0 | Status: SHIPPED | OUTPATIENT
Start: 2023-12-30 | End: 2024-01-01

## 2023-12-30 RX ORDER — LACTULOSE 10 G/15ML
20 SOLUTION ORAL ONCE
Status: COMPLETED | OUTPATIENT
Start: 2023-12-30 | End: 2023-12-30

## 2023-12-30 RX ADMIN — LACTULOSE 20 G: 20 SOLUTION ORAL at 00:47

## 2023-12-30 NOTE — DISCHARGE INSTRUCTIONS
Your CT scan showed an abnormal area in your bladder.  Please follow-up with the listed urology for further evaluation of this area.  You also had an abnormal area in your lung seen on the CT scan.  Please follow-up with your primary care provider as it is recommended that you have a repeat CT scan in the future to reevaluate.

## 2023-12-30 NOTE — ED PROVIDER NOTES
[facility]    EMERGENCY DEPARTMENT ENCOUNTER      Pt Name: Jesus Montiel Jr.  MRN: [id number]  YOB: 1934  Date of evaluation: 12/29/2023  Provider: Jaspreet Sanchez MD    CHIEF COMPLAINT       Chief Complaint   Patient presents with    Constipation    Rectal Pain         HISTORY OF PRESENT ILLNESS   Jesus Montiel Jr. is a 89 y.o. male who presents to the emergency department with complaint of constipation.  Patient has not been able to have a good bowel movement over the course of the last couple days and is having some discomfort in his rectal area.  He has no associated abdominal pain or vomiting.  Denies any fever or chills.      Nursing notes were reviewed.    REVIEW OF SYSTEMS     ROS:  A chief complaint appropriate review of systems was completed and is negative except as noted in the HPI.      PAST MEDICAL HISTORY     Past Medical History:   Diagnosis Date    Hyperlipidemia     Hypertension     Meniere disease     Myocardial infarction     Tremor          SURGICAL HISTORY       Past Surgical History:   Procedure Laterality Date    ABLATION OF DYSRHYTHMIC FOCUS      CATARACT EXTRACTION, BILATERAL      CORONARY ANGIOPLASTY WITH STENT PLACEMENT      CORONARY ARTERY BYPASS BRING BACK FOR EXPLORATION      HERNIA REPAIR      INNER EAR SURGERY      PACEMAKER IMPLANTATION           CURRENT MEDICATIONS       Current Facility-Administered Medications:     lactulose (CHRONULAC) 10 GM/15ML solution 20 g, 20 g, Oral, Once, Jaspreet Sanchez MD    mineral oil enema 1 enema, 1 enema, Rectal, Once, Jaspreet Sanchez MD    Current Outpatient Medications:     atorvastatin (LIPITOR) 20 MG tablet, Take 1 tablet by mouth Daily., Disp: , Rfl:     CALCIUM PO, Take  by mouth., Disp: , Rfl:     Cholecalciferol (VITAMIN D3) 5000 units capsule capsule, Take 7,000 Units by mouth 1 (One) Time Per Week., Disp: , Rfl:     coenzyme Q10 100 MG capsule, Take 1 capsule by mouth Daily., Disp: , Rfl:     dabigatran etexilate  (PRADAXA) 150 MG capsu, Take 1 capsule by mouth 2 (Two) Times a Day., Disp: , Rfl:     Denosumab (PROLIA SC), Inject  under the skin into the appropriate area as directed., Disp: , Rfl:     Docusate Calcium (STOOL SOFTENER PO), Take  by mouth Daily., Disp: , Rfl:     doxycycline (MONODOX) 100 MG capsule, Take 1 capsule by mouth 2 (Two) Times a Day., Disp: 20 capsule, Rfl: 0    furosemide (LASIX) 20 MG tablet, Take 1 tablet by mouth Daily., Disp: , Rfl:     ketoconazole (NIZORAL) 2 % cream, ketoconazole 2 % topical cream  APPLY TO THE AFFECTED AREA(S) BY TOPICAL ROUTE ONCE DAILY, Disp: , Rfl:     lactulose (CHRONULAC) 10 GM/15ML solution, Take 30 mL by mouth 2 (Two) Times a Day for 2 days., Disp: 120 mL, Rfl: 0    Multiple Vitamins-Minerals (MULTIVITAMIN ADULTS PO), Take  by mouth., Disp: , Rfl:     mupirocin (BACTROBAN) 2 % ointment, mupirocin 2 % topical ointment  APPLY A SMALL AMOUNT TO THE AFFECTED AREA BY TOPICAL ROUTE 2 TIMES PER DAY, Disp: , Rfl:     propranolol (INDERAL) 20 MG tablet, Take 1 tablet by mouth Daily., Disp: , Rfl:     Pyridoxine HCl (VITAMIN B-6 PO), Take 100 mg by mouth., Disp: , Rfl:     rivastigmine (EXELON) 9.5 MG/24HR patch, Place 1 patch on the skin as directed by provider Daily., Disp: , Rfl:     vitamin B-12 (CYANOCOBALAMIN) 500 MCG tablet, Take  by mouth Daily., Disp: , Rfl:     Zinc 25 MG tablet, Take  by mouth., Disp: , Rfl:     ALLERGIES     Patient has no known allergies.    FAMILY HISTORY     No family history on file.       SOCIAL HISTORY       Social History     Socioeconomic History    Marital status:    Tobacco Use    Smoking status: Never    Smokeless tobacco: Never   Substance and Sexual Activity    Alcohol use: Yes     Alcohol/week: 4.0 standard drinks of alcohol     Types: 4 Glasses of wine per week    Drug use: No    Sexual activity: Defer         PHYSICAL EXAM    (up to 7 for level 4, 8 or more for level 5)     Vitals:    12/29/23 2114 12/29/23 2202 12/29/23 2232  "12/29/23 2332   BP: 126/67 103/57 109/61 101/54   BP Location: Right arm      Patient Position: Sitting      Pulse: 70 70 70 70   Resp: 18      Temp: 98 °F (36.7 °C)      TempSrc: Oral      SpO2: 99% 98% 96% 96%   Weight: 61.2 kg (135 lb)      Height: 162.6 cm (64\")          General: Awake, alert, no acute distress.  HEENT: Conjunctivae normal.  Neck: Trachea midline.  Cardiac: Heart regular rate, rhythm, no murmurs, rubs, or gallops  Lungs: Lungs are clear to auscultation, there is no wheezing, rhonchi, or rales. There is no use of accessory muscles.  Chest wall: There is no tenderness to palpation over the chest wall or over ribs  Abdomen: Abdomen is soft, nontender, nondistended. There are no firm or pulsatile masses, no rebound rigidity or guarding.   Musculoskeletal: No deformity.  Neuro: Alert and oriented x 4.  Dermatology: Skin is warm and dry  Psych: Mentation is grossly normal, cognition is grossly normal. Affect is appropriate.        DIAGNOSTIC RESULTS     EKG: All EKGs are interpreted by the Emergency Department Physician who either signs or Co-signs this chart in the absence of a cardiologist.    No orders to display         RADIOLOGY:   [x] Radiologist's Report Reviewed:  CT Abdomen Pelvis Without Contrast   Final Result   Impression:      Large stool ball in the rectum with formed stool throughout the upstream colon, consistent with constipation. Patient may benefit from disimpaction.      Somewhat asymmetric wall thickening along the right posterior aspect of the urinary bladder. Consider follow-up with cystoscopy.      Nodular consolidations in the left lower lobe of the lung. Findings new from prior exam and may be infectious/inflammatory in etiology. Recommend follow-up chest CT in 3 months given nodular component, perhaps after treatment.         Electronically Signed: Nghia Corbett MD     12/29/2023 9:50 PM EST     Workstation ID: [id number]          I ordered and independently reviewed the above " noted radiographic studies.        LABS:    I have reviewed and interpreted all of the currently available lab results from this visit (if applicable):  Results for orders placed or performed during the hospital encounter of 05/27/23   Comprehensive Metabolic Panel    Specimen: Blood   Result Value Ref Range    Glucose 99 65 - 99 mg/dL    BUN 26 (H) 8 - 23 mg/dL    Creatinine 0.99 0.76 - 1.27 mg/dL    Sodium 141 136 - 145 mmol/L    Potassium 3.5 3.5 - 5.2 mmol/L    Chloride 101 98 - 107 mmol/L    CO2 29.0 22.0 - 29.0 mmol/L    Calcium 9.0 8.6 - 10.5 mg/dL    Total Protein 6.2 6.0 - 8.5 g/dL    Albumin 3.7 3.5 - 5.2 g/dL    ALT (SGPT) 17 1 - 41 U/L    AST (SGOT) 29 1 - 40 U/L    Alkaline Phosphatase 58 39 - 117 U/L    Total Bilirubin 0.8 0.0 - 1.2 mg/dL    Globulin 2.5 gm/dL    A/G Ratio 1.5 g/dL    BUN/Creatinine Ratio 26.3 (H) 7.0 - 25.0    Anion Gap 11.0 5.0 - 15.0 mmol/L    eGFR 72.8 >60.0 mL/min/1.73   CBC Auto Differential    Specimen: Blood   Result Value Ref Range    WBC 3.67 3.40 - 10.80 10*3/mm3    RBC 4.25 4.14 - 5.80 10*6/mm3    Hemoglobin 13.0 13.0 - 17.7 g/dL    Hematocrit 40.6 37.5 - 51.0 %    MCV 95.5 79.0 - 97.0 fL    MCH 30.6 26.6 - 33.0 pg    MCHC 32.0 31.5 - 35.7 g/dL    RDW 14.2 12.3 - 15.4 %    RDW-SD 49.8 37.0 - 54.0 fl    MPV 12.0 6.0 - 12.0 fL    Platelets 84 (L) 140 - 450 10*3/mm3    Neutrophil % 61.6 42.7 - 76.0 %    Lymphocyte % 24.8 19.6 - 45.3 %    Monocyte % 11.4 5.0 - 12.0 %    Eosinophil % 1.4 0.3 - 6.2 %    Basophil % 0.8 0.0 - 1.5 %    Immature Grans % 0.0 0.0 - 0.5 %    Neutrophils, Absolute 2.26 1.70 - 7.00 10*3/mm3    Lymphocytes, Absolute 0.91 0.70 - 3.10 10*3/mm3    Monocytes, Absolute 0.42 0.10 - 0.90 10*3/mm3    Eosinophils, Absolute 0.05 0.00 - 0.40 10*3/mm3    Basophils, Absolute 0.03 0.00 - 0.20 10*3/mm3    Immature Grans, Absolute 0.00 0.00 - 0.05 10*3/mm3    nRBC 0.0 0.0 - 0.2 /100 WBC        If labs were ordered, I independently reviewed the results and considered them  in treating the patient.      EMERGENCY DEPARTMENT COURSE and DIFFERENTIAL DIAGNOSIS/MDM:   Vitals:  AS OF 00:28 EST    BP - 101/54  HR - 70  TEMP - 98 °F (36.7 °C) (Oral)  O2 SATS - 96%        Discussion below represents my analysis of pertinent findings related to patient's condition, differential diagnosis, treatment plan and final disposition.      Differential diagnosis:  The differential diagnosis associated with the patient's presentation includes: Fecal impaction, small bowel obstruction, stercoral colitis      Independent interpretations (ECG/rhythm strip/X-ray/US/CT scan): I independently interpreted the patient's abdominal CT and cardiac monitor.  There is no evidence of obstructive change.  And the patient is in sinus rhythm.      Patient's care impacted by:   [] Diabetes   [x] Hypertension   [x] Coronary Artery Disease   [] Cancer   [] Other:     Care significantly affected by Social Determinants of Health (housing and economic circumstances, unemployment)    [] Yes     [x] No   If yes, Patient's care significantly limited by  Social Determinants of Health including:    [] Inadequate housing    [] Low income    [] Alcoholism and drug addiction in family    [] Problems related to primary support group    [] Unemployment    [] Problems related to employment    [] Other Social Determinants of Health:       Consideration of admission/observation vs discharge: Patient was able to have a large bowel movement with symptomatic improvement and is stable for discharge home      I considered prescription management with:    [] Pain medication:   [] Antiviral:   [] Antibiotic:   [x] Other: Patient prescribed lactulose for further treatment of constipation    ED Course:    ED Course as of 12/30/23 0028   Sat Dec 30, 2023   0023 Patient was able to have a large bowel movement and feels significantly better.  Will prescribe lactulose for the next few days.  Area of thickening noted in the bladder as well as abnormal  areas seen in the lung.  I discussed this with both the patient and family member at bedside.  They understand that they are to follow-up with urology as listed for cystoscopy and follow-up with repeat chest CT in 3 months for reevaluation of these areas. [NS]      ED Course User Index  [NS] Jaspreet Sanchez MD             I had a discussion with the patient/family regarding diagnosis, diagnostic results, treatment plan, and medications.  The patient/family indicated understanding of these instructions.  I spent adequate time at the bedside preceding discharge necessary to personally discuss the aftercare instructions, giving patient education, providing explanations of the results of our evaluations/findings, and my decision making to assure that the patient/family understand the plan of care.  Time was allotted to answer questions at that time and throughout the ED course.  Emphasis was placed on timely follow-up after discharge.  I also discussed the potential for the development of an acute emergent condition requiring further evaluation, admission, or even surgical intervention. I discussed that we found nothing during the visit today indicating the need for further workup, admission, or the presence of an unstable medical condition.  I encouraged the patient to return to the emergency department immediately for ANY concerns, worsening, new complaints, or if symptoms persist and unable to seek follow-up in a timely fashion.  The patient/family expressed understanding and agreement with this plan.  The patient will follow-up with their PCP in 1-2 days for reevaluation.           PROCEDURES:  Procedures    CRITICAL CARE TIME        FINAL IMPRESSION      1. Fecal impaction    2. Constipation, unspecified constipation type          DISPOSITION/PLAN     ED Disposition       ED Disposition   Discharge    Condition   Stable    Comment   --                 Comment: Please note this report has been produced using  speech recognition software.      Jaspreet Sanchez MD  Attending Emergency Physician             Jaspreet Sanchez MD  12/30/23 0028

## 2024-01-10 ENCOUNTER — HOSPITAL ENCOUNTER (EMERGENCY)
Facility: HOSPITAL | Age: 89
Discharge: HOME | End: 2024-01-10
Attending: EMERGENCY MEDICINE | Admitting: EMERGENCY MEDICINE
Payer: MEDICARE

## 2024-01-10 VITALS
SYSTOLIC BLOOD PRESSURE: 137 MMHG | HEART RATE: 70 BPM | BODY MASS INDEX: 23.05 KG/M2 | TEMPERATURE: 98 F | RESPIRATION RATE: 20 BRPM | DIASTOLIC BLOOD PRESSURE: 70 MMHG | OXYGEN SATURATION: 99 % | HEIGHT: 64 IN | WEIGHT: 135 LBS

## 2024-01-10 DIAGNOSIS — K56.41 FECAL IMPACTION: Primary | ICD-10-CM

## 2024-01-10 PROCEDURE — 99284 EMERGENCY DEPT VISIT MOD MDM: CPT

## 2024-01-10 NOTE — DISCHARGE INSTRUCTIONS
Recommend taking stool softener MiraLAX 1 capful (17 g) dissolved in 16 ounces of water 2 times daily to soften the stool.  Take bowel stimulant senna 1 tablet 1-2 times daily to stimulate bowel movement.  Follow-up with your primary care doctor for further management.  Return to the ER as needed for new or worsening symptoms.

## 2024-01-10 NOTE — ED PROVIDER NOTES
Subjective   History of Present Illness  Patient presents to emergency department with constipation and rectal pain.  Patient was seen in our emergency room for similar symptoms in the recent past.  He was diagnosed with a fecal impaction.  Manual disimpaction was not performed and patient was discharged on osmotic laxatives.  Patient has taken that as well as suppositories without relief and so he comes to the ER for further evaluation and management.    History provided by:  Patient      Review of Systems    Past Medical History:   Diagnosis Date    Hyperlipidemia     Hypertension     Meniere disease     Myocardial infarction     Tremor        No Known Allergies    Past Surgical History:   Procedure Laterality Date    ABLATION OF DYSRHYTHMIC FOCUS      CATARACT EXTRACTION, BILATERAL      CORONARY ANGIOPLASTY WITH STENT PLACEMENT      CORONARY ARTERY BYPASS BRING BACK FOR EXPLORATION      HERNIA REPAIR      INNER EAR SURGERY      PACEMAKER IMPLANTATION         No family history on file.    Social History     Socioeconomic History    Marital status:    Tobacco Use    Smoking status: Never    Smokeless tobacco: Never   Substance and Sexual Activity    Alcohol use: Yes     Alcohol/week: 4.0 standard drinks of alcohol     Types: 4 Glasses of wine per week    Drug use: No    Sexual activity: Defer           Objective   Physical Exam  Constitutional:       General: He is not in acute distress.     Appearance: Normal appearance.   HENT:      Head: Normocephalic and atraumatic.   Eyes:      Conjunctiva/sclera: Conjunctivae normal.      Pupils: Pupils are equal, round, and reactive to light.   Cardiovascular:      Rate and Rhythm: Normal rate and regular rhythm.      Pulses: Normal pulses.      Heart sounds: Normal heart sounds. No murmur heard.     No gallop.   Pulmonary:      Effort: Pulmonary effort is normal. No respiratory distress.      Breath sounds: Normal breath sounds.   Abdominal:      General: Abdomen is  flat. Bowel sounds are normal. There is no distension.      Tenderness: There is no abdominal tenderness.   Genitourinary:     Comments: Fecal impaction on digital total examination is noted  Musculoskeletal:         General: No swelling or deformity. Normal range of motion.   Skin:     General: Skin is warm and dry.      Capillary Refill: Capillary refill takes less than 2 seconds.      Coloration: Skin is not jaundiced.   Neurological:      General: No focal deficit present.      Mental Status: He is alert and oriented to person, place, and time.   Psychiatric:         Mood and Affect: Mood normal.         Behavior: Behavior normal.         Procedures           ED Course                                             Medical Decision Making  Patient signs symptoms and physical exam findings are consistent with fecal impaction and also constipation.  Manual disimpaction was performed with removal of multiple hard stool balls.  There is some impacted stool more proximal in the colon that I am unable to reach and so a an enema was performed with substantial bowel movement and significant relief.  Patient discharged from the ER in good condition after being counseled on constipation management.    Problems Addressed:  Fecal impaction: acute illness or injury    Risk  OTC drugs.        Final diagnoses:   Fecal impaction       ED Disposition  ED Disposition       ED Disposition   Discharge    Condition   Stable    Comment   --           No results found for this or any previous visit (from the past 24 hour(s)).  Note: In addition to lab results from this visit, the labs listed above may include labs taken at another facility or during a different encounter within the last 24 hours. Please correlate lab times with ED admission and discharge times for further clarification of the services performed during this visit.    No orders to display     Vitals:    01/10/24 0056   BP: 137/70   BP Location: Left arm   Patient Position:  "Sitting   Pulse: 70   Resp: 20   Temp: 98 °F (36.7 °C)   TempSrc: Oral   SpO2: 99%   Weight: 61.2 kg (135 lb)   Height: 162.6 cm (64.02\")     Medications - No data to display  ECG/EMG Results (last 24 hours)       ** No results found for the last 24 hours. **          No orders to display           Shaq Ha MD  [address] [place] [address] [place] [id number] [phone]               Medication List      No changes were made to your prescriptions during this visit.            Jules Adan MD  01/10/24 1664    "

## (undated) DEVICE — SKIN PREP TRAY W/CHG: Brand: MEDLINE INDUSTRIES, INC.